# Patient Record
Sex: MALE | Race: WHITE | Employment: OTHER
[De-identification: names, ages, dates, MRNs, and addresses within clinical notes are randomized per-mention and may not be internally consistent; named-entity substitution may affect disease eponyms.]

---

## 2017-02-06 DIAGNOSIS — E78.5 DYSLIPIDEMIA: ICD-10-CM

## 2017-02-06 DIAGNOSIS — Z79.4 UNCONTROLLED TYPE 2 DIABETES MELLITUS WITH CHRONIC KIDNEY DISEASE, WITH LONG-TERM CURRENT USE OF INSULIN, UNSPECIFIED CKD STAGE: ICD-10-CM

## 2017-02-06 DIAGNOSIS — E11.22 UNCONTROLLED TYPE 2 DIABETES MELLITUS WITH CHRONIC KIDNEY DISEASE, WITH LONG-TERM CURRENT USE OF INSULIN, UNSPECIFIED CKD STAGE: ICD-10-CM

## 2017-02-06 DIAGNOSIS — E11.65 UNCONTROLLED TYPE 2 DIABETES MELLITUS WITH CHRONIC KIDNEY DISEASE, WITH LONG-TERM CURRENT USE OF INSULIN, UNSPECIFIED CKD STAGE: ICD-10-CM

## 2017-02-06 RX ORDER — ATORVASTATIN CALCIUM 40 MG/1
TABLET, FILM COATED ORAL
Qty: 90 TABLET | Refills: 1 | Status: SHIPPED | OUTPATIENT
Start: 2017-02-06 | End: 2017-07-14 | Stop reason: SDUPTHER

## 2017-02-06 RX ORDER — GABAPENTIN 300 MG/1
300 CAPSULE ORAL 2 TIMES DAILY
Qty: 180 CAPSULE | Refills: 1 | Status: SHIPPED | OUTPATIENT
Start: 2017-02-06 | End: 2017-06-06 | Stop reason: ALTCHOICE

## 2017-02-07 ENCOUNTER — OFFICE VISIT (OUTPATIENT)
Dept: PRIMARY CARE CLINIC | Facility: CLINIC | Age: 62
End: 2017-02-07

## 2017-02-07 VITALS
SYSTOLIC BLOOD PRESSURE: 121 MMHG | HEIGHT: 67 IN | DIASTOLIC BLOOD PRESSURE: 86 MMHG | WEIGHT: 217.9 LBS | TEMPERATURE: 97.4 F | HEART RATE: 97 BPM | RESPIRATION RATE: 20 BRPM | BODY MASS INDEX: 34.2 KG/M2

## 2017-02-07 DIAGNOSIS — E78.5 DYSLIPIDEMIA: Chronic | ICD-10-CM

## 2017-02-07 DIAGNOSIS — I10 ESSENTIAL HYPERTENSION: Chronic | ICD-10-CM

## 2017-02-07 PROCEDURE — 99214 OFFICE O/P EST MOD 30 MIN: CPT | Performed by: NURSE PRACTITIONER

## 2017-02-07 ASSESSMENT — ENCOUNTER SYMPTOMS
SORE THROAT: 0
RHINORRHEA: 0
CONSTIPATION: 0
VISUAL CHANGE: 0
SHORTNESS OF BREATH: 0
COUGH: 0
ABDOMINAL PAIN: 0
VOMITING: 0
NAUSEA: 0
DIARRHEA: 0
WHEEZING: 0

## 2017-02-08 DIAGNOSIS — E11.65 UNCONTROLLED TYPE 2 DIABETES MELLITUS WITH CHRONIC KIDNEY DISEASE, WITH LONG-TERM CURRENT USE OF INSULIN, UNSPECIFIED CKD STAGE: ICD-10-CM

## 2017-02-08 DIAGNOSIS — Z79.4 UNCONTROLLED TYPE 2 DIABETES MELLITUS WITH CHRONIC KIDNEY DISEASE, WITH LONG-TERM CURRENT USE OF INSULIN, UNSPECIFIED CKD STAGE: ICD-10-CM

## 2017-02-08 DIAGNOSIS — E11.22 UNCONTROLLED TYPE 2 DIABETES MELLITUS WITH CHRONIC KIDNEY DISEASE, WITH LONG-TERM CURRENT USE OF INSULIN, UNSPECIFIED CKD STAGE: ICD-10-CM

## 2017-03-16 ENCOUNTER — HOSPITAL ENCOUNTER (OUTPATIENT)
Age: 62
Discharge: HOME | End: 2017-03-16
Payer: MEDICARE

## 2017-03-16 LAB
ANION GAP SERPL CALCULATED.3IONS-SCNC: 12 MMOL/L (ref 9–17)
BUN BLDV-MCNC: 23 MG/DL (ref 6–20)
BUN/CREAT BLD: 18 (ref 9–20)
CALCIUM SERPL-MCNC: 9.3 MG/DL (ref 8.6–10.4)
CHLORIDE BLD-SCNC: 99 MMOL/L (ref 98–107)
CO2: 25 MMOL/L (ref 20–31)
CREAT SERPL-MCNC: 1.28 MG/DL (ref 0.7–1.2)
CREATININE URINE: 114.4 MG/DL (ref 39–259)
GFR AFRICAN AMERICAN: >60 ML/MIN
GFR NON-AFRICAN AMERICAN: 58 ML/MIN
GFR SERPL CREATININE-BSD FRML MDRD: ABNORMAL ML/MIN/{1.73_M2}
GFR SERPL CREATININE-BSD FRML MDRD: ABNORMAL ML/MIN/{1.73_M2}
GLUCOSE BLD-MCNC: 316 MG/DL (ref 70–99)
POTASSIUM SERPL-SCNC: 5.6 MMOL/L (ref 3.7–5.3)
SODIUM BLD-SCNC: 136 MMOL/L (ref 135–144)
TOTAL PROTEIN, URINE: 5 MG/DL

## 2017-03-16 PROCEDURE — 80048 BASIC METABOLIC PNL TOTAL CA: CPT

## 2017-03-16 PROCEDURE — 84156 ASSAY OF PROTEIN URINE: CPT

## 2017-03-16 PROCEDURE — 82570 ASSAY OF URINE CREATININE: CPT

## 2017-03-17 DIAGNOSIS — E87.5 HYPERKALEMIA: Primary | ICD-10-CM

## 2017-03-22 ENCOUNTER — HOSPITAL ENCOUNTER (OUTPATIENT)
Age: 62
Discharge: HOME | End: 2017-03-22
Payer: MEDICARE

## 2017-03-22 DIAGNOSIS — E87.5 HYPERKALEMIA: ICD-10-CM

## 2017-03-22 LAB
ANION GAP SERPL CALCULATED.3IONS-SCNC: 12 MMOL/L (ref 9–17)
CHLORIDE BLD-SCNC: 98 MMOL/L (ref 98–107)
CO2: 26 MMOL/L (ref 20–31)
POTASSIUM SERPL-SCNC: 5.1 MMOL/L (ref 3.7–5.3)
SODIUM BLD-SCNC: 136 MMOL/L (ref 135–144)

## 2017-03-22 PROCEDURE — 80051 ELECTROLYTE PANEL: CPT

## 2017-03-22 PROCEDURE — 36415 COLL VENOUS BLD VENIPUNCTURE: CPT

## 2017-04-25 ENCOUNTER — HOSPITAL ENCOUNTER (OUTPATIENT)
Age: 62
Discharge: HOME | End: 2017-04-25
Payer: MEDICARE

## 2017-04-25 LAB
ALBUMIN SERPL-MCNC: 4 G/DL (ref 3.5–5.2)
ANION GAP SERPL CALCULATED.3IONS-SCNC: 13 MMOL/L (ref 9–17)
BUN BLDV-MCNC: 25 MG/DL (ref 6–20)
BUN/CREAT BLD: 21 (ref 9–20)
CALCIUM SERPL-MCNC: 9.1 MG/DL (ref 8.6–10.4)
CHLORIDE BLD-SCNC: 103 MMOL/L (ref 98–107)
CHOLESTEROL/HDL RATIO: 5
CHOLESTEROL: 105 MG/DL
CO2: 24 MMOL/L (ref 20–31)
CREAT SERPL-MCNC: 1.21 MG/DL (ref 0.7–1.2)
CREATININE URINE: 107.1 MG/DL (ref 39–259)
GFR AFRICAN AMERICAN: >60 ML/MIN
GFR NON-AFRICAN AMERICAN: >60 ML/MIN
GFR SERPL CREATININE-BSD FRML MDRD: ABNORMAL ML/MIN/{1.73_M2}
GFR SERPL CREATININE-BSD FRML MDRD: ABNORMAL ML/MIN/{1.73_M2}
GLUCOSE BLD-MCNC: 259 MG/DL (ref 70–99)
HDLC SERPL-MCNC: 21 MG/DL
LDL CHOLESTEROL: 29 MG/DL (ref 0–130)
MICROALBUMIN/CREAT 24H UR: <12 MG/L
MICROALBUMIN/CREAT UR-RTO: 11 MCG/MG CREAT
PHOSPHORUS: 2.7 MG/DL (ref 2.5–4.5)
POTASSIUM SERPL-SCNC: 5.6 MMOL/L (ref 3.7–5.3)
SODIUM BLD-SCNC: 140 MMOL/L (ref 135–144)
TRIGL SERPL-MCNC: 275 MG/DL
VITAMIN D 25-HYDROXY: 19.1 NG/ML (ref 30–100)
VLDLC SERPL CALC-MCNC: ABNORMAL MG/DL (ref 1–30)

## 2017-04-25 PROCEDURE — 36415 COLL VENOUS BLD VENIPUNCTURE: CPT

## 2017-04-25 PROCEDURE — 82306 VITAMIN D 25 HYDROXY: CPT

## 2017-04-25 PROCEDURE — 80061 LIPID PANEL: CPT

## 2017-04-25 PROCEDURE — 82570 ASSAY OF URINE CREATININE: CPT

## 2017-04-25 PROCEDURE — 80069 RENAL FUNCTION PANEL: CPT

## 2017-04-25 PROCEDURE — 82043 UR ALBUMIN QUANTITATIVE: CPT

## 2017-05-31 ENCOUNTER — HOSPITAL ENCOUNTER (OUTPATIENT)
Age: 62
Discharge: HOME | End: 2017-05-31
Payer: MEDICARE

## 2017-05-31 DIAGNOSIS — E78.5 DYSLIPIDEMIA: Chronic | ICD-10-CM

## 2017-05-31 LAB
ALBUMIN SERPL-MCNC: 3.9 G/DL (ref 3.5–5.2)
ALBUMIN/GLOBULIN RATIO: 1.5 (ref 1–2.5)
ALP BLD-CCNC: 104 U/L (ref 40–129)
ALT SERPL-CCNC: 23 U/L (ref 5–41)
ANION GAP SERPL CALCULATED.3IONS-SCNC: 14 MMOL/L (ref 9–17)
AST SERPL-CCNC: 17 U/L
BILIRUB SERPL-MCNC: 0.23 MG/DL (ref 0.3–1.2)
BUN BLDV-MCNC: 20 MG/DL (ref 6–20)
BUN/CREAT BLD: 18 (ref 9–20)
CALCIUM SERPL-MCNC: 9.1 MG/DL (ref 8.6–10.4)
CHLORIDE BLD-SCNC: 100 MMOL/L (ref 98–107)
CHOLESTEROL/HDL RATIO: 6.5
CHOLESTEROL: 137 MG/DL
CO2: 23 MMOL/L (ref 20–31)
CREAT SERPL-MCNC: 1.11 MG/DL (ref 0.7–1.2)
CREATININE URINE: 98.2 MG/DL (ref 39–259)
ESTIMATED AVERAGE GLUCOSE: 249 MG/DL
GFR AFRICAN AMERICAN: >60 ML/MIN
GFR NON-AFRICAN AMERICAN: >60 ML/MIN
GFR SERPL CREATININE-BSD FRML MDRD: ABNORMAL ML/MIN/{1.73_M2}
GFR SERPL CREATININE-BSD FRML MDRD: ABNORMAL ML/MIN/{1.73_M2}
GLUCOSE BLD-MCNC: 294 MG/DL (ref 70–99)
HBA1C MFR BLD: 10.3 % (ref 4.8–5.9)
HDLC SERPL-MCNC: 21 MG/DL
LDL CHOLESTEROL: 57 MG/DL (ref 0–130)
MICROALBUMIN/CREAT 24H UR: 17 MG/L
MICROALBUMIN/CREAT UR-RTO: 17 MCG/MG CREAT
POTASSIUM SERPL-SCNC: 5 MMOL/L (ref 3.7–5.3)
SODIUM BLD-SCNC: 137 MMOL/L (ref 135–144)
TOTAL PROTEIN: 6.5 G/DL (ref 6.4–8.3)
TRIGL SERPL-MCNC: 296 MG/DL
VLDLC SERPL CALC-MCNC: ABNORMAL MG/DL (ref 1–30)

## 2017-05-31 PROCEDURE — 80053 COMPREHEN METABOLIC PANEL: CPT

## 2017-05-31 PROCEDURE — 36415 COLL VENOUS BLD VENIPUNCTURE: CPT

## 2017-05-31 PROCEDURE — 82043 UR ALBUMIN QUANTITATIVE: CPT

## 2017-05-31 PROCEDURE — 83036 HEMOGLOBIN GLYCOSYLATED A1C: CPT

## 2017-05-31 PROCEDURE — 82570 ASSAY OF URINE CREATININE: CPT

## 2017-05-31 PROCEDURE — 80061 LIPID PANEL: CPT

## 2017-06-06 ENCOUNTER — OFFICE VISIT (OUTPATIENT)
Dept: PRIMARY CARE CLINIC | Age: 62
End: 2017-06-06
Payer: MEDICARE

## 2017-06-06 VITALS
BODY MASS INDEX: 36.38 KG/M2 | TEMPERATURE: 98.5 F | WEIGHT: 231.8 LBS | HEART RATE: 100 BPM | HEIGHT: 67 IN | DIASTOLIC BLOOD PRESSURE: 86 MMHG | RESPIRATION RATE: 20 BRPM | SYSTOLIC BLOOD PRESSURE: 135 MMHG

## 2017-06-06 DIAGNOSIS — I10 ESSENTIAL HYPERTENSION: Chronic | ICD-10-CM

## 2017-06-06 DIAGNOSIS — E78.5 DYSLIPIDEMIA: Chronic | ICD-10-CM

## 2017-06-06 PROCEDURE — 99214 OFFICE O/P EST MOD 30 MIN: CPT | Performed by: NURSE PRACTITIONER

## 2017-06-06 RX ORDER — ISOPROPYL ALCOHOL 70 ML/100ML
SWAB TOPICAL
Refills: 11 | COMMUNITY
Start: 2017-05-15

## 2017-06-06 ASSESSMENT — ENCOUNTER SYMPTOMS
WHEEZING: 0
CONSTIPATION: 0
DIARRHEA: 0
VISUAL CHANGE: 0
VOMITING: 0
COUGH: 0
NAUSEA: 0
SORE THROAT: 0
ABDOMINAL PAIN: 0
SHORTNESS OF BREATH: 0
RHINORRHEA: 0

## 2017-06-29 ENCOUNTER — TELEPHONE (OUTPATIENT)
Dept: PRIMARY CARE CLINIC | Age: 62
End: 2017-06-29

## 2017-06-29 DIAGNOSIS — Z12.12 SCREENING FOR COLORECTAL CANCER: Primary | ICD-10-CM

## 2017-06-29 DIAGNOSIS — Z12.11 SCREENING FOR COLORECTAL CANCER: Primary | ICD-10-CM

## 2017-06-29 DIAGNOSIS — Z12.11 SCREENING FOR COLORECTAL CANCER: ICD-10-CM

## 2017-06-29 DIAGNOSIS — Z12.12 SCREENING FOR COLORECTAL CANCER: ICD-10-CM

## 2017-06-29 LAB
CONTROL: PRESENT
HEMOCCULT STL QL: NEGATIVE

## 2017-06-29 PROCEDURE — 82274 ASSAY TEST FOR BLOOD FECAL: CPT | Performed by: NURSE PRACTITIONER

## 2017-07-14 DIAGNOSIS — E11.22 UNCONTROLLED TYPE 2 DIABETES MELLITUS WITH CHRONIC KIDNEY DISEASE, WITH LONG-TERM CURRENT USE OF INSULIN, UNSPECIFIED CKD STAGE: ICD-10-CM

## 2017-07-14 DIAGNOSIS — E78.5 DYSLIPIDEMIA: ICD-10-CM

## 2017-07-14 DIAGNOSIS — E11.65 UNCONTROLLED TYPE 2 DIABETES MELLITUS WITH CHRONIC KIDNEY DISEASE, WITH LONG-TERM CURRENT USE OF INSULIN, UNSPECIFIED CKD STAGE: ICD-10-CM

## 2017-07-14 DIAGNOSIS — Z79.4 UNCONTROLLED TYPE 2 DIABETES MELLITUS WITH CHRONIC KIDNEY DISEASE, WITH LONG-TERM CURRENT USE OF INSULIN, UNSPECIFIED CKD STAGE: ICD-10-CM

## 2017-07-14 RX ORDER — ATORVASTATIN CALCIUM 40 MG/1
TABLET, FILM COATED ORAL
Qty: 90 TABLET | Refills: 1 | Status: SHIPPED | OUTPATIENT
Start: 2017-07-14 | End: 2017-11-09 | Stop reason: SDUPTHER

## 2017-07-14 RX ORDER — MELATONIN
Qty: 90 TABLET | Refills: 1 | Status: SHIPPED | OUTPATIENT
Start: 2017-07-14 | End: 2018-01-11 | Stop reason: SDUPTHER

## 2017-07-14 RX ORDER — ASPIRIN 81 MG
TABLET, DELAYED RELEASE (ENTERIC COATED) ORAL
Qty: 90 TABLET | Refills: 1 | Status: SHIPPED | OUTPATIENT
Start: 2017-07-14 | End: 2018-01-11 | Stop reason: SDUPTHER

## 2017-07-14 RX ORDER — LINAGLIPTIN 5 MG/1
TABLET, FILM COATED ORAL
Qty: 90 TABLET | Refills: 1 | Status: SHIPPED | OUTPATIENT
Start: 2017-07-14 | End: 2018-01-11 | Stop reason: SDUPTHER

## 2017-09-18 ENCOUNTER — HOSPITAL ENCOUNTER (OUTPATIENT)
Age: 62
Discharge: HOME | End: 2017-09-18
Payer: MEDICARE

## 2017-09-18 DIAGNOSIS — E87.5 HYPERKALEMIA: ICD-10-CM

## 2017-09-18 LAB
ANION GAP SERPL CALCULATED.3IONS-SCNC: 13 MMOL/L (ref 9–17)
BUN BLDV-MCNC: 29 MG/DL (ref 6–20)
BUN/CREAT BLD: 21 (ref 9–20)
CALCIUM SERPL-MCNC: 9.4 MG/DL (ref 8.6–10.4)
CHLORIDE BLD-SCNC: 105 MMOL/L (ref 98–107)
CO2: 23 MMOL/L (ref 20–31)
CREAT SERPL-MCNC: 1.39 MG/DL (ref 0.7–1.2)
CREATININE URINE: 130.8 MG/DL (ref 39–259)
GFR AFRICAN AMERICAN: >60 ML/MIN
GFR NON-AFRICAN AMERICAN: 52 ML/MIN
GFR SERPL CREATININE-BSD FRML MDRD: ABNORMAL ML/MIN/{1.73_M2}
GFR SERPL CREATININE-BSD FRML MDRD: ABNORMAL ML/MIN/{1.73_M2}
GLUCOSE BLD-MCNC: 203 MG/DL (ref 70–99)
POTASSIUM SERPL-SCNC: 5.8 MMOL/L (ref 3.7–5.3)
SODIUM BLD-SCNC: 141 MMOL/L (ref 135–144)
TOTAL PROTEIN, URINE: 9 MG/DL

## 2017-09-18 PROCEDURE — 36415 COLL VENOUS BLD VENIPUNCTURE: CPT

## 2017-09-18 PROCEDURE — 82570 ASSAY OF URINE CREATININE: CPT

## 2017-09-18 PROCEDURE — 84156 ASSAY OF PROTEIN URINE: CPT

## 2017-09-18 PROCEDURE — 80048 BASIC METABOLIC PNL TOTAL CA: CPT

## 2017-09-25 ENCOUNTER — HOSPITAL ENCOUNTER (OUTPATIENT)
Age: 62
Discharge: HOME | End: 2017-09-25
Payer: MEDICARE

## 2017-09-25 DIAGNOSIS — E87.5 HYPERKALEMIA: ICD-10-CM

## 2017-09-25 LAB
ANION GAP SERPL CALCULATED.3IONS-SCNC: 13 MMOL/L (ref 9–17)
CHLORIDE BLD-SCNC: 100 MMOL/L (ref 98–107)
CO2: 24 MMOL/L (ref 20–31)
POTASSIUM SERPL-SCNC: 5 MMOL/L (ref 3.7–5.3)
SODIUM BLD-SCNC: 137 MMOL/L (ref 135–144)

## 2017-09-25 PROCEDURE — 80051 ELECTROLYTE PANEL: CPT

## 2017-09-25 PROCEDURE — 36415 COLL VENOUS BLD VENIPUNCTURE: CPT

## 2017-09-29 ENCOUNTER — HOSPITAL ENCOUNTER (OUTPATIENT)
Age: 62
Discharge: HOME | End: 2017-09-29
Payer: MEDICARE

## 2017-09-29 LAB
ALBUMIN SERPL-MCNC: 4.1 G/DL (ref 3.5–5.2)
ALBUMIN/GLOBULIN RATIO: 1.3 (ref 1–2.5)
ALP BLD-CCNC: 97 U/L (ref 40–129)
ALT SERPL-CCNC: 20 U/L (ref 5–41)
ANION GAP SERPL CALCULATED.3IONS-SCNC: 15 MMOL/L (ref 9–17)
AST SERPL-CCNC: 15 U/L
BILIRUB SERPL-MCNC: 0.31 MG/DL (ref 0.3–1.2)
BUN BLDV-MCNC: 22 MG/DL (ref 6–20)
BUN/CREAT BLD: 18 (ref 9–20)
CALCIUM SERPL-MCNC: 9.9 MG/DL (ref 8.6–10.4)
CHLORIDE BLD-SCNC: 104 MMOL/L (ref 98–107)
CO2: 23 MMOL/L (ref 20–31)
CREAT SERPL-MCNC: 1.25 MG/DL (ref 0.7–1.2)
CREATININE URINE: 103.5 MG/DL (ref 39–259)
ESTIMATED AVERAGE GLUCOSE: 235 MG/DL
GFR AFRICAN AMERICAN: >60 ML/MIN
GFR NON-AFRICAN AMERICAN: 59 ML/MIN
GFR SERPL CREATININE-BSD FRML MDRD: ABNORMAL ML/MIN/{1.73_M2}
GFR SERPL CREATININE-BSD FRML MDRD: ABNORMAL ML/MIN/{1.73_M2}
GLUCOSE BLD-MCNC: 201 MG/DL (ref 70–99)
HBA1C MFR BLD: 9.8 % (ref 4.8–5.9)
MICROALBUMIN/CREAT 24H UR: <12 MG/L
MICROALBUMIN/CREAT UR-RTO: 12 MCG/MG CREAT
POTASSIUM SERPL-SCNC: 5.2 MMOL/L (ref 3.7–5.3)
SODIUM BLD-SCNC: 142 MMOL/L (ref 135–144)
TOTAL PROTEIN: 7.2 G/DL (ref 6.4–8.3)

## 2017-09-29 PROCEDURE — 82570 ASSAY OF URINE CREATININE: CPT

## 2017-09-29 PROCEDURE — 36415 COLL VENOUS BLD VENIPUNCTURE: CPT

## 2017-09-29 PROCEDURE — 80053 COMPREHEN METABOLIC PANEL: CPT

## 2017-09-29 PROCEDURE — 82043 UR ALBUMIN QUANTITATIVE: CPT

## 2017-09-29 PROCEDURE — 83036 HEMOGLOBIN GLYCOSYLATED A1C: CPT

## 2017-10-05 ENCOUNTER — OFFICE VISIT (OUTPATIENT)
Dept: PRIMARY CARE CLINIC | Age: 62
End: 2017-10-05
Payer: MEDICARE

## 2017-10-05 VITALS
DIASTOLIC BLOOD PRESSURE: 89 MMHG | RESPIRATION RATE: 20 BRPM | TEMPERATURE: 98.1 F | HEART RATE: 95 BPM | SYSTOLIC BLOOD PRESSURE: 131 MMHG | WEIGHT: 238.8 LBS | BODY MASS INDEX: 37.48 KG/M2 | HEIGHT: 67 IN

## 2017-10-05 DIAGNOSIS — I10 ESSENTIAL HYPERTENSION: Chronic | ICD-10-CM

## 2017-10-05 DIAGNOSIS — E78.5 DYSLIPIDEMIA: Chronic | ICD-10-CM

## 2017-10-05 DIAGNOSIS — Z23 NEED FOR INFLUENZA VACCINATION: ICD-10-CM

## 2017-10-05 PROCEDURE — 90686 IIV4 VACC NO PRSV 0.5 ML IM: CPT | Performed by: NURSE PRACTITIONER

## 2017-10-05 PROCEDURE — 90471 IMMUNIZATION ADMIN: CPT | Performed by: NURSE PRACTITIONER

## 2017-10-05 PROCEDURE — 99214 OFFICE O/P EST MOD 30 MIN: CPT | Performed by: NURSE PRACTITIONER

## 2017-10-05 ASSESSMENT — ENCOUNTER SYMPTOMS
ABDOMINAL PAIN: 0
WHEEZING: 0
NAUSEA: 0
SHORTNESS OF BREATH: 0
SORE THROAT: 0
CONSTIPATION: 0
COUGH: 0
VOMITING: 0
RHINORRHEA: 0
DIARRHEA: 0

## 2017-10-05 ASSESSMENT — PATIENT HEALTH QUESTIONNAIRE - PHQ9
1. LITTLE INTEREST OR PLEASURE IN DOING THINGS: 0
SUM OF ALL RESPONSES TO PHQ QUESTIONS 1-9: 1
SUM OF ALL RESPONSES TO PHQ9 QUESTIONS 1 & 2: 1
2. FEELING DOWN, DEPRESSED OR HOPELESS: 1

## 2017-10-05 NOTE — PROGRESS NOTES
Name: Dilshad Quintana  :   Chronic Disease Visit Information    BP Readings from Last 3 Encounters:   10/05/17 131/89   17 134/86   17 135/86          Hemoglobin A1C (%)   Date Value   2017 9.8 (H)   2017 10.3 (H)   2017 10.0 (H)     Microalb/Crt. Ratio (mcg/mg creat)   Date Value   2017 12     LDL Cholesterol (mg/dL)   Date Value   2017 57     HDL (mg/dL)   Date Value   2017 21 (L)     BUN (mg/dL)   Date Value   2017 22 (H)     CREATININE (mg/dL)   Date Value   2017 1.25 (H)     Glucose (mg/dL)   Date Value   2017 201 (H)   2012 200 (H)            Have you changed or started any medications since your last visit including any over-the-counter medicines, vitamins, or herbal medicines? no   Are you having any side effects from any of your medications? -  no  Have you stopped taking any of your medications? Is so, why? -  no    Have you seen any other physician or provider since your last visit? No  Have you had any other diagnostic tests since your last visit? Yes - Records Obtained  Have you been seen in the emergency room and/or had an admission to a hospital since we last saw you? No  Have you had your annual diabetic retinal (eye) exam? Yes - Records Requested  Have you had your routine dental cleaning in the past 6 months? no    Have you activated your [employer] account? If not, what are your barriers?  No    Patient Care Team:  Yady Fisher CNP as PCP - General (Nurse Practitioner)         Medical History Review  Past Medical, Family, and Social History reviewed and does contribute to the patient presenting condition    Health Maintenance   Topic Date Due    Pneumococcal med risk (1 of 1 - PPSV23) 2018 (Originally 11/15/1976)    Diabetic foot exam  2017    Diabetic retinal exam  2017    Diabetic hemoglobin A1C test  2017    Lipid screen  2018    Colon Cancer Screen FIT/FOBT  2018    DTaP/Tdap/Td vaccine (2 - Td) 10/02/2025    Flu vaccine  Completed    Hepatitis C screen  Completed    HIV screen  Completed         Chief Complaint:     Chief Complaint   Patient presents with    Diabetes     Routine office visit. Blood sugar today was 240.  Hyperlipidemia    Hypertension       History of Present Illness:      Miriam Acevedo is a 63 y.o.  male who presents with Diabetes (Routine office visit. Blood sugar today was 240. ); Hyperlipidemia; and Hypertension      HPI Comments: Anthony Beltran is here today for his routine office visit. DM- has established care with endocrinologist, increasing insulin (long acting and mealtime) gradually, feels better. UPDATE- CONTINUES WITH ENDOCRINOLOGY    Diabetes   He presents for his follow-up diabetic visit. He has type 2 diabetes mellitus. The initial diagnosis of diabetes was made 18 years ago. His disease course has been stable (SLIGHT IMPROVEMENT). There are no hypoglycemic associated symptoms. Pertinent negatives for hypoglycemia include no dizziness or headaches. Associated symptoms include foot paresthesias. Pertinent negatives for diabetes include no chest pain, no fatigue, no polydipsia, no polyphagia and no polyuria. There are no hypoglycemic complications. Symptoms are worsening. Diabetic complications include a CVA and nephropathy. Pertinent negatives for diabetic complications include no heart disease or peripheral neuropathy. Risk factors for coronary artery disease include diabetes mellitus, dyslipidemia, hypertension, male sex, obesity and sedentary lifestyle. Current diabetic treatment includes insulin injections, oral agent (dual therapy) and oral agent (monotherapy). He is compliant with treatment all of the time. His weight is stable. He is following a generally healthy diet. When asked about meal planning, he reported none. He has not had a previous visit with a dietitian. He participates in exercise daily.  There is no change in his home

## 2017-10-05 NOTE — MR AVS SNAPSHOT
After Visit Summary             Ondina Sheets   10/5/2017 11:00 AM   Office Visit    Description:  Male : 1955   Provider:  Ronni Becerra CNP   Department:  [facility]              Your Follow-Up and Future Appointments         Below is a list of your follow-up and future appointments. This may not be a complete list as you may have made appointments directly with providers that we are not aware of or your providers may have made some for you. Please call your providers to confirm appointments. It is important to keep your appointments. Please bring your current insurance card, photo ID, co-pay, and all medication bottles to your appointment. If self-pay, payment is expected at the time of service. Your To-Do List     Future Appointments Provider Department Dept Phone    2018 9:30 AM Jairo Fisher CNP [facility] [phone]    Please arrive 15 minutes prior to appointment, bring photo ID and insurance card. 2018 1:30 PM Angus Stringer MD [facility] [address] [place] [address]    Please arrive 15 minutes prior to appointment time, bring insurance card and photo ID. Future Orders Complete By Expires    CBC Auto Differential [[id number] Custom]  4/3/2018 (Approximate) 10/5/2018    Comprehensive Metabolic Panel [PLG19 Custom]  4/3/2018 (Approximate) 10/5/2018    Hemoglobin A1C [LAB90 Custom]  4/3/2018 (Approximate) 10/5/2018    Lipid Panel [LAB18 Custom]  4/3/2018 (Approximate) 10/5/2018    Microalbumin, Ur [[id number] Custom]  4/3/2018 (Approximate) 10/5/2018    Follow-Up    Return in about 6 months (around 2018) for check up.          Information from Your Visit        Department     Name Address Phone Fax    [facility] [address]   [address] [phone]      You Were Seen for:         Comments    Uncontrolled type 2 diabetes mellitus with diabetic mononeuropathy, with long-term current use of insulin ([facility])   [[id number]]         Vital Signs     Blood Pressure Pulse Temperature Respirations Height Weight    131/89 (Site: Right Arm, Position: Sitting, Cuff Size: Large Adult) 95 98.1 °F (36.7 °C) (Temporal) 20 5' 7\" (1.702 m) 238 lb 12.8 oz (108.3 kg)    Body Mass Index Smoking Status                37.4 kg/m2 Former Smoker          Additional Information about your Body Mass Index (BMI)           Your BMI as listed above is considered obese (30 or more). BMI is an estimate of body fat, calculated from your height and weight. The higher your BMI, the greater your risk of heart disease, high blood pressure, type 2 diabetes, stroke, gallstones, arthritis, sleep apnea, and certain cancers. BMI is not perfect. It may overestimate body fat in athletes and people who are more muscular. Even a small weight loss (between 5 and 10 percent of your current weight) by decreasing your calorie intake and becoming more physically active will help lower your risk of developing or worsening diseases associated with obesity. Learn more at: [employer]          Instructions         Diabetes Foot Health: Care Instructions  Your Care Instructions    When you have diabetes, your feet need extra care and attention. Diabetes can damage the nerve endings and blood vessels in your feet, making you less likely to notice when your feet are injured. Diabetes also limits your body's ability to fight infection and get blood to areas that need it. If you get a minor foot injury, it could become an ulcer or a serious infection. With good foot care, you can prevent most of these problems. Caring for your feet can be quick and easy. Most of the care can be done when you are bathing or getting ready for bed. Follow-up care is a key part of your treatment and safety.  Be sure to make and go to all appointments, and call your doctor if you are having Where can you learn more? Go to [email or web address] and sign in to your [employer] account. Enter A739 in the [employer] box to learn more about \"Diabetes Foot Health: Care Instructions. \"     If you do not have an account, please click on the \"Sign Up Now\" link. Current as of: March 13, 2017  Content Version: 11.3  © 8013-1424 [employer]. Care instructions adapted under license by [facility] ([facility]). If you have questions about a medical condition or this instruction, always ask your healthcare professional. [place] [address] any warranty or liability for your use of this information. Today's Medication Changes          These changes are accurate as of: 10/5/17 11:18 AM.  If you have any questions, ask your nurse or doctor. CHANGE how you take these medications           sodium polystyrene 15 GM/60ML suspension   Commonly known as:  SPS   Instructions: Take 60 mLs by mouth once for 1 dose   Quantity:  1 Bottle   Refills:  0   What changed:  Another medication with the same name was removed. Continue taking this medication, and follow the directions you see here.    Changed by:  Sisi Rogers MD               Your Current Medications Are              metFORMIN (GLUCOPHAGE) 1000 MG tablet Take 1,000 mg by mouth 2 times daily (with meals)    insulin glargine (BASAGLAR KWIKPEN) 100 UNIT/ML injection pen Inject 48 Units into the skin 2 times daily    Cholecalciferol (VITAMIN D3) 1000 units TABS TAKE 1 TABLET BY MOUTH DAILY    ASPIRIN LOW DOSE 81 MG EC tablet TAKE 1 TABLET BY MOUTH DAILY    atorvastatin (LIPITOR) 40 MG tablet TAKE ONE TABLET BY MOUTH DAILY    TRADJENTA 5 MG tablet TAKE 1 TABLET BY MOUTH DAILY    Alcohol Swabs (EASY TOUCH ALCOHOL PREP MEDIUM) 70 % PADS USE TO TEST TWICE DAILY    LYRICA 75 MG capsule TAKE (1) CAPSULE BY MOUTH TWICE DAILY    lisinopril (PRINIVIL;ZESTRIL) 5 MG tablet Take 1 tablet by mouth daily Dr. Giovanna Hudson    insulin aspart (NOVOLOG FLEXPEN) 100 UNIT/ML injection pen Inject 4 Units into the skin 3 times daily (before meals)    Insulin Pen Needle 31G X 8 MM MISC 1 each by Does not apply route 5 times daily Diabetes type 2, uncontrolled E11.41, E11.65, Z79.4    TRUE METRIX BLOOD GLUCOSE TEST strip 1 each by In Vitro route 5 times daily    Blood Glucose Monitoring Suppl (TRUE METRIX METER) W/DEVICE KIT     TRUEPLUS LANCETS 33G MISC     allopurinol (ZYLOPRIM) 100 MG tablet Take 100 mg by mouth daily     sodium polystyrene (SPS) 15 GM/60ML suspension Take 60 mLs by mouth once for 1 dose      Allergies           No Known Allergies      We Ordered/Performed the following           INFLUENZA, QUADV, 3 YRS AND OLDER, IM, PF, PREFILL SYR OR SDV, 0.5ML (FLUZONE QUADV, PF)          Additional Information        Basic Information     Date Of Birth Sex Race Ethnicity Preferred Language Preferred Written Language    1955 Male White Non-/Non  English English      Problem List as of 10/5/2017  Date Reviewed: 9/27/2017                CKD (chronic kidney disease) stage 3, GFR 30-59 ml/min (Chronic)    BLANE (acute kidney injury) ([facility] [address] (Chronic)    Essential hypertension (Chronic)    Gout (Chronic)    Dyslipidemia (Chronic)    Diabetes type 2, uncontrolled ([facility] [address] (Chronic)      Immunizations as of 10/5/2017     Name Date    Influenza Virus Vaccine 10/14/2015    Influenza, Joel Victor, 3 yrs and older, IM, Preservative Free 10/5/2017, 11/3/2016    Tdap (Boostrix, Adacel) 10/2/2015      Preventive Care        Date Due    Pneumococcal Vaccine - Pneumovax for adults aged 19-64 years with: chronic heart disease, chronic lung disease, diabetes mellitus, alcoholism, chronic liver disease, or cigarette smoking. (1 of 1 - PPSV23) 2/7/2018 (Originally 11/15/1976)    Diabetic Foot Exam 11/3/2017    Eye Exam By An Eye Doctor 12/13/2017    Hemoglobin A1C (Test For Long-Term Glucose Control) 12/29/2017 Cholesterol Screening 5/31/2018    Colon Cancer Stool Test 6/29/2018    Tetanus Combination Vaccine (2 - Td) 10/2/2025            MyChart Signup           Our records indicate that you have declined MyChart signup.

## 2017-11-09 DIAGNOSIS — E78.5 DYSLIPIDEMIA: ICD-10-CM

## 2017-11-09 RX ORDER — ATORVASTATIN CALCIUM 40 MG/1
TABLET, FILM COATED ORAL
Qty: 90 TABLET | Refills: 1 | Status: SHIPPED | OUTPATIENT
Start: 2017-11-09 | End: 2018-04-09 | Stop reason: SDUPTHER

## 2017-11-09 NOTE — TELEPHONE ENCOUNTER
Health Maintenance   Topic Date Due    Diabetic foot exam  11/03/2017    Pneumococcal med risk (1 of 1 - PPSV23) 02/07/2018 (Originally 11/15/1976)    Diabetic retinal exam  12/13/2017    Diabetic hemoglobin A1C test  12/29/2017    Lipid screen  05/31/2018    Colon Cancer Screen FIT/FOBT  06/29/2018    DTaP/Tdap/Td vaccine (2 - Td) 10/02/2025    Flu vaccine  Completed    Hepatitis C screen  Completed    HIV screen  Completed             (applicable per patient's age: Cancer Screenings, Depression Screening, Fall Risk Screening, Immunizations)    Hemoglobin A1C (%)   Date Value   09/29/2017 9.8 (H)   05/31/2017 10.3 (H)   02/03/2017 10.0 (H)     Microalb/Crt.  Ratio (mcg/mg creat)   Date Value   09/29/2017 12     LDL Cholesterol (mg/dL)   Date Value   05/31/2017 57     AST (U/L)   Date Value   09/29/2017 15     ALT (U/L)   Date Value   09/29/2017 20     BUN (mg/dL)   Date Value   09/29/2017 22 (H)      (goal A1C is < 7)   (goal LDL is <100) need 30-50% reduction from baseline     BP Readings from Last 3 Encounters:   10/05/17 131/89   09/27/17 134/86   06/06/17 135/86    (goal /80)      All Future Testing planned in [facility]:  Lab Frequency Next Occurrence   Basic Metabolic Panel Once 35/70/3000   PTH, INTACT WITH IONIZED CALCIUM Once 04/23/2018   Phosphorus Once 04/23/2018   Vitamin D 25 Hydroxy Once 04/23/2018   Creatinine, Random Urine Once 04/23/2018   Protein, urine, random Once 04/23/2018   CBC Auto Differential Once 04/03/2018   Comprehensive Metabolic Panel Once 65/08/7349   Hemoglobin A1C Once 04/03/2018   Lipid Panel Once 04/03/2018   Microalbumin, Ur Once 04/03/2018       Next Visit Date:  Future Appointments  Date Time Provider Penelope Pham   4/9/2018 9:30 AM Britt Fisher CNP [facility]   4/25/2018 1:30 PM Arsalan Salvador MD [facility] None            Patient Active Problem List:     Diabetes type 2, uncontrolled ([address]     Dyslipidemia     Gout     Essential hypertension BLANE (acute kidney injury) ([facility] [address]     CKD (chronic kidney disease) stage 3, GFR 30-59 ml/min

## 2018-01-11 DIAGNOSIS — E11.22 UNCONTROLLED TYPE 2 DIABETES MELLITUS WITH CHRONIC KIDNEY DISEASE, WITH LONG-TERM CURRENT USE OF INSULIN, UNSPECIFIED CKD STAGE: ICD-10-CM

## 2018-01-11 DIAGNOSIS — E78.5 DYSLIPIDEMIA: ICD-10-CM

## 2018-01-11 DIAGNOSIS — E11.65 UNCONTROLLED TYPE 2 DIABETES MELLITUS WITH CHRONIC KIDNEY DISEASE, WITH LONG-TERM CURRENT USE OF INSULIN, UNSPECIFIED CKD STAGE: ICD-10-CM

## 2018-01-11 DIAGNOSIS — Z79.4 UNCONTROLLED TYPE 2 DIABETES MELLITUS WITH CHRONIC KIDNEY DISEASE, WITH LONG-TERM CURRENT USE OF INSULIN, UNSPECIFIED CKD STAGE: ICD-10-CM

## 2018-01-11 RX ORDER — MELATONIN
Qty: 90 TABLET | Refills: 1 | Status: SHIPPED | OUTPATIENT
Start: 2018-01-11 | End: 2018-07-10 | Stop reason: SDUPTHER

## 2018-01-11 RX ORDER — ATORVASTATIN CALCIUM 40 MG/1
TABLET, FILM COATED ORAL
Qty: 90 TABLET | Refills: 1 | OUTPATIENT
Start: 2018-01-11

## 2018-01-11 RX ORDER — ASPIRIN 81 MG/1
TABLET ORAL
Qty: 90 TABLET | Refills: 1 | Status: SHIPPED | OUTPATIENT
Start: 2018-01-11 | End: 2018-07-10 | Stop reason: SDUPTHER

## 2018-01-11 RX ORDER — LINAGLIPTIN 5 MG/1
TABLET, FILM COATED ORAL
Qty: 90 TABLET | Refills: 1 | Status: SHIPPED | OUTPATIENT
Start: 2018-01-11 | End: 2018-05-04 | Stop reason: ALTCHOICE

## 2018-01-25 ENCOUNTER — HOSPITAL ENCOUNTER (OUTPATIENT)
Dept: VASCULAR LAB | Age: 63
Discharge: HOME | End: 2018-01-25
Payer: MEDICARE

## 2018-01-25 DIAGNOSIS — M79.672 FOOT PAIN, LEFT: ICD-10-CM

## 2018-01-25 DIAGNOSIS — M79.671 FOOT PAIN, RIGHT: ICD-10-CM

## 2018-01-25 PROCEDURE — 93923 UPR/LXTR ART STDY 3+ LVLS: CPT

## 2018-03-15 NOTE — TELEPHONE ENCOUNTER
Please verify dose with his endocrinologist and see if there is a reason they will not continue to prescribe his insulin. Thank you.

## 2018-03-15 NOTE — TELEPHONE ENCOUNTER
Patient states that he takes 52 units twice a day and he states that his pharmacy said that they will not refill his insulin but he is not sure why. Health Maintenance   Topic Date Due    Pneumococcal med risk (1 of 1 - PPSV23) 11/15/1976    Shingles Vaccine (1 of 2 - 2 Dose Series) 11/15/2007    Diabetic foot exam  11/03/2017    Diabetic retinal exam  12/13/2017    A1C test (Diabetic or Prediabetic)  12/29/2017    Lipid screen  05/31/2018    Colon Cancer Screen FIT/FOBT  06/29/2018    Potassium monitoring  09/29/2018    Creatinine monitoring  09/29/2018    DTaP/Tdap/Td vaccine (2 - Td) 10/02/2025    Flu vaccine  Completed    Hepatitis C screen  Completed    HIV screen  Completed             (applicable per patient's age: Cancer Screenings, Depression Screening, Fall Risk Screening, Immunizations)    Hemoglobin A1C (%)   Date Value   09/29/2017 9.8 (H)   05/31/2017 10.3 (H)   02/03/2017 10.0 (H)     Microalb/Crt.  Ratio (mcg/mg creat)   Date Value   09/29/2017 12     LDL Cholesterol (mg/dL)   Date Value   05/31/2017 57     AST (U/L)   Date Value   09/29/2017 15     ALT (U/L)   Date Value   09/29/2017 20     BUN (mg/dL)   Date Value   09/29/2017 22 (H)      (goal A1C is < 7)   (goal LDL is <100) need 30-50% reduction from baseline     BP Readings from Last 3 Encounters:   10/05/17 131/89   09/27/17 134/86   06/06/17 135/86    (goal /80)      All Future Testing planned in [facility]:  Lab Frequency Next Occurrence   Basic Metabolic Panel Once 97/86/8223   PTH, INTACT WITH IONIZED CALCIUM Once 04/23/2018   Phosphorus Once 04/23/2018   Vitamin D 25 Hydroxy Once 04/23/2018   Creatinine, Random Urine Once 04/23/2018   Protein, urine, random Once 04/23/2018   CBC Auto Differential Once 04/03/2018   Comprehensive Metabolic Panel Once 52/31/1209   Hemoglobin A1C Once 04/03/2018   Lipid Panel Once 04/03/2018   Microalbumin, Ur Once 04/03/2018       Next Visit Date:  Future Appointments  Date Time Provider Department Center   4/9/2018 9:30 AM Ronn Fisher, CNP [facility]   4/25/2018 1:30 PM Arsalan Horn MD [facility] None            Patient Active Problem List:     Diabetes type 2, uncontrolled ([facility] [address]     Dyslipidemia     Gout     Essential hypertension     BLANE (acute kidney injury) ([facility] [address]     CKD (chronic kidney disease) stage 3, GFR 30-59 ml/min

## 2018-04-04 ENCOUNTER — HOSPITAL ENCOUNTER (OUTPATIENT)
Age: 63
Discharge: HOME | End: 2018-04-04
Payer: MEDICARE

## 2018-04-04 DIAGNOSIS — E78.5 DYSLIPIDEMIA: Chronic | ICD-10-CM

## 2018-04-04 LAB
ABSOLUTE EOS #: 0.34 K/UL (ref 0–0.44)
ABSOLUTE IMMATURE GRANULOCYTE: 0.06 K/UL (ref 0–0.3)
ABSOLUTE LYMPH #: 2.61 K/UL (ref 1.1–3.7)
ABSOLUTE MONO #: 0.76 K/UL (ref 0.1–1.2)
ALBUMIN SERPL-MCNC: 3.6 G/DL (ref 3.5–5.2)
ALBUMIN/GLOBULIN RATIO: 1.1 (ref 1–2.5)
ALP BLD-CCNC: 109 U/L (ref 40–129)
ALT SERPL-CCNC: 17 U/L (ref 5–41)
ANION GAP SERPL CALCULATED.3IONS-SCNC: 11 MMOL/L (ref 9–17)
AST SERPL-CCNC: 13 U/L
BASOPHILS # BLD: 1 % (ref 0–2)
BASOPHILS ABSOLUTE: 0.05 K/UL (ref 0–0.2)
BILIRUB SERPL-MCNC: 0.26 MG/DL (ref 0.3–1.2)
BUN BLDV-MCNC: 21 MG/DL (ref 8–23)
BUN/CREAT BLD: 18 (ref 9–20)
CALCIUM SERPL-MCNC: 9.2 MG/DL (ref 8.6–10.4)
CHLORIDE BLD-SCNC: 102 MMOL/L (ref 98–107)
CHOLESTEROL/HDL RATIO: 5.4
CHOLESTEROL: 102 MG/DL
CO2: 24 MMOL/L (ref 20–31)
CREAT SERPL-MCNC: 1.2 MG/DL (ref 0.7–1.2)
CREATININE URINE: 104.1 MG/DL (ref 39–259)
DIFFERENTIAL TYPE: ABNORMAL
EOSINOPHILS RELATIVE PERCENT: 3 % (ref 1–4)
ESTIMATED AVERAGE GLUCOSE: 226 MG/DL
GFR AFRICAN AMERICAN: >60 ML/MIN
GFR NON-AFRICAN AMERICAN: >60 ML/MIN
GFR SERPL CREATININE-BSD FRML MDRD: ABNORMAL ML/MIN/{1.73_M2}
GFR SERPL CREATININE-BSD FRML MDRD: ABNORMAL ML/MIN/{1.73_M2}
GLUCOSE BLD-MCNC: 313 MG/DL (ref 70–99)
HBA1C MFR BLD: 9.5 % (ref 4.8–5.9)
HCT VFR BLD CALC: 41.4 % (ref 40.7–50.3)
HDLC SERPL-MCNC: 19 MG/DL
HEMOGLOBIN: 12.9 G/DL (ref 13–17)
IMMATURE GRANULOCYTES: 1 %
LDL CHOLESTEROL: 7 MG/DL (ref 0–130)
LYMPHOCYTES # BLD: 26 % (ref 24–43)
MCH RBC QN AUTO: 28.1 PG (ref 25.2–33.5)
MCHC RBC AUTO-ENTMCNC: 31.2 G/DL (ref 28.4–34.8)
MCV RBC AUTO: 90.2 FL (ref 82.6–102.9)
MICROALBUMIN/CREAT 24H UR: 15 MG/L
MICROALBUMIN/CREAT UR-RTO: 14 MCG/MG CREAT
MONOCYTES # BLD: 8 % (ref 3–12)
NRBC AUTOMATED: 0 PER 100 WBC
PDW BLD-RTO: 13 % (ref 11.8–14.4)
PLATELET # BLD: 233 K/UL (ref 138–453)
PLATELET ESTIMATE: ABNORMAL
PMV BLD AUTO: 11.1 FL (ref 8.1–13.5)
POTASSIUM SERPL-SCNC: 5.4 MMOL/L (ref 3.7–5.3)
RBC # BLD: 4.59 M/UL (ref 4.21–5.77)
RBC # BLD: ABNORMAL 10*6/UL
SEG NEUTROPHILS: 62 % (ref 36–65)
SEGMENTED NEUTROPHILS ABSOLUTE COUNT: 6.21 K/UL (ref 1.5–8.1)
SODIUM BLD-SCNC: 137 MMOL/L (ref 135–144)
TOTAL PROTEIN: 6.8 G/DL (ref 6.4–8.3)
TRIGL SERPL-MCNC: 378 MG/DL
VLDLC SERPL CALC-MCNC: ABNORMAL MG/DL (ref 1–30)
WBC # BLD: 10 K/UL (ref 3.5–11.3)
WBC # BLD: ABNORMAL 10*3/UL

## 2018-04-04 PROCEDURE — 80061 LIPID PANEL: CPT

## 2018-04-04 PROCEDURE — 83036 HEMOGLOBIN GLYCOSYLATED A1C: CPT

## 2018-04-04 PROCEDURE — 82570 ASSAY OF URINE CREATININE: CPT

## 2018-04-04 PROCEDURE — 85025 COMPLETE CBC W/AUTO DIFF WBC: CPT

## 2018-04-04 PROCEDURE — 82043 UR ALBUMIN QUANTITATIVE: CPT

## 2018-04-04 PROCEDURE — 80053 COMPREHEN METABOLIC PANEL: CPT

## 2018-04-04 PROCEDURE — 36415 COLL VENOUS BLD VENIPUNCTURE: CPT

## 2018-04-09 ENCOUNTER — OFFICE VISIT (OUTPATIENT)
Dept: PRIMARY CARE CLINIC | Age: 63
End: 2018-04-09
Payer: MEDICARE

## 2018-04-09 VITALS
TEMPERATURE: 97.1 F | BODY MASS INDEX: 37.07 KG/M2 | WEIGHT: 236.2 LBS | SYSTOLIC BLOOD PRESSURE: 133 MMHG | RESPIRATION RATE: 16 BRPM | HEIGHT: 67 IN | HEART RATE: 94 BPM | DIASTOLIC BLOOD PRESSURE: 84 MMHG

## 2018-04-09 DIAGNOSIS — Z23 NEED FOR PROPHYLACTIC VACCINATION AGAINST STREPTOCOCCUS PNEUMONIAE (PNEUMOCOCCUS): ICD-10-CM

## 2018-04-09 DIAGNOSIS — E78.5 DYSLIPIDEMIA: ICD-10-CM

## 2018-04-09 PROCEDURE — 99214 OFFICE O/P EST MOD 30 MIN: CPT | Performed by: NURSE PRACTITIONER

## 2018-04-09 PROCEDURE — 90732 PPSV23 VACC 2 YRS+ SUBQ/IM: CPT | Performed by: NURSE PRACTITIONER

## 2018-04-09 PROCEDURE — 90471 IMMUNIZATION ADMIN: CPT | Performed by: NURSE PRACTITIONER

## 2018-04-09 PROCEDURE — 1036F TOBACCO NON-USER: CPT | Performed by: NURSE PRACTITIONER

## 2018-04-09 PROCEDURE — G8427 DOCREV CUR MEDS BY ELIG CLIN: HCPCS | Performed by: NURSE PRACTITIONER

## 2018-04-09 PROCEDURE — 3046F HEMOGLOBIN A1C LEVEL >9.0%: CPT | Performed by: NURSE PRACTITIONER

## 2018-04-09 PROCEDURE — 3017F COLORECTAL CA SCREEN DOC REV: CPT | Performed by: NURSE PRACTITIONER

## 2018-04-09 PROCEDURE — G8417 CALC BMI ABV UP PARAM F/U: HCPCS | Performed by: NURSE PRACTITIONER

## 2018-04-09 RX ORDER — ATORVASTATIN CALCIUM 40 MG/1
TABLET, FILM COATED ORAL
Qty: 90 TABLET | Refills: 3 | Status: SHIPPED | OUTPATIENT
Start: 2018-04-09 | End: 2018-07-12 | Stop reason: SDUPTHER

## 2018-04-09 RX ORDER — LANCETS 28 GAUGE
1 EACH MISCELLANEOUS 3 TIMES DAILY
Qty: 100 EACH | Refills: 11 | Status: SHIPPED | OUTPATIENT
Start: 2018-04-09 | End: 2018-11-14 | Stop reason: ALTCHOICE

## 2018-04-09 RX ORDER — BLOOD-GLUCOSE METER
1 KIT MISCELLANEOUS DAILY
Qty: 1 KIT | Refills: 0 | Status: SHIPPED | OUTPATIENT
Start: 2018-04-09 | End: 2018-11-14 | Stop reason: ALTCHOICE

## 2018-04-09 ASSESSMENT — ENCOUNTER SYMPTOMS
ABDOMINAL PAIN: 0
SHORTNESS OF BREATH: 0
COUGH: 0
RHINORRHEA: 0
VISUAL CHANGE: 0
WHEEZING: 0
BLURRED VISION: 0
DIARRHEA: 0
NAUSEA: 0
CONSTIPATION: 0
SORE THROAT: 0
VOMITING: 0

## 2018-04-20 ENCOUNTER — HOSPITAL ENCOUNTER (INPATIENT)
Age: 63
LOS: 2 days | Discharge: HOME | DRG: 469 | End: 2018-04-22
Attending: FAMILY MEDICINE | Admitting: INTERNAL MEDICINE
Payer: MEDICARE

## 2018-04-20 ENCOUNTER — HOSPITAL ENCOUNTER (OUTPATIENT)
Age: 63
Discharge: HOME | End: 2018-04-20
Payer: MEDICARE

## 2018-04-20 ENCOUNTER — HOSPITAL ENCOUNTER (EMERGENCY)
Age: 63
Discharge: ANOTHER HOSPITAL | End: 2018-04-20
Payer: MEDICARE

## 2018-04-20 ENCOUNTER — TELEPHONE (OUTPATIENT)
Dept: PRIMARY CARE CLINIC | Age: 63
End: 2018-04-20

## 2018-04-20 VITALS
OXYGEN SATURATION: 95 % | TEMPERATURE: 97.9 F | RESPIRATION RATE: 20 BRPM | HEART RATE: 98 BPM | DIASTOLIC BLOOD PRESSURE: 82 MMHG | SYSTOLIC BLOOD PRESSURE: 128 MMHG

## 2018-04-20 DIAGNOSIS — N17.9 ACUTE KIDNEY INJURY (HCC): Primary | ICD-10-CM

## 2018-04-20 DIAGNOSIS — N18.30 CKD (CHRONIC KIDNEY DISEASE) STAGE 3, GFR 30-59 ML/MIN (HCC): Chronic | ICD-10-CM

## 2018-04-20 DIAGNOSIS — I10 ESSENTIAL HYPERTENSION: ICD-10-CM

## 2018-04-20 LAB
ANION GAP SERPL CALCULATED.3IONS-SCNC: 15 MMOL/L (ref 9–17)
BILIRUBIN URINE: NEGATIVE
BUN BLDV-MCNC: 53 MG/DL (ref 8–23)
BUN/CREAT BLD: 17 (ref 9–20)
CALCIUM IONIZED: 1.31 MMOL/L (ref 1.13–1.33)
CALCIUM SERPL-MCNC: 9.3 MG/DL (ref 8.6–10.4)
CHLORIDE BLD-SCNC: 96 MMOL/L (ref 98–107)
CO2: 24 MMOL/L (ref 20–31)
COLOR: YELLOW
COMMENT UA: ABNORMAL
CREAT SERPL-MCNC: 3.08 MG/DL (ref 0.7–1.2)
CREATININE URINE: 181.6 MG/DL (ref 39–259)
GFR AFRICAN AMERICAN: 25 ML/MIN
GFR NON-AFRICAN AMERICAN: 21 ML/MIN
GFR SERPL CREATININE-BSD FRML MDRD: ABNORMAL ML/MIN/{1.73_M2}
GFR SERPL CREATININE-BSD FRML MDRD: ABNORMAL ML/MIN/{1.73_M2}
GLUCOSE BLD-MCNC: 337 MG/DL (ref 70–99)
GLUCOSE URINE: NEGATIVE
KETONES, URINE: NEGATIVE
LEUKOCYTE ESTERASE, URINE: NEGATIVE
NITRITE, URINE: NEGATIVE
PH UA: 5.5 (ref 5–9)
PHOSPHORUS: 4.9 MG/DL (ref 2.5–4.5)
POTASSIUM SERPL-SCNC: 5 MMOL/L (ref 3.7–5.3)
PROTEIN UA: NEGATIVE
PTH INTACT: 58.87 PG/ML (ref 15–65)
SODIUM BLD-SCNC: 135 MMOL/L (ref 135–144)
SPECIFIC GRAVITY UA: 1.02 (ref 1.01–1.02)
TOTAL PROTEIN, URINE: 10 MG/DL
TURBIDITY: CLEAR
URINE HGB: NEGATIVE
UROBILINOGEN, URINE: NORMAL
VITAMIN D 25-HYDROXY: 18.7 NG/ML (ref 30–100)

## 2018-04-20 PROCEDURE — 99283 EMERGENCY DEPT VISIT LOW MDM: CPT

## 2018-04-20 PROCEDURE — 81003 URINALYSIS AUTO W/O SCOPE: CPT

## 2018-04-20 PROCEDURE — 1200000000 HC SEMI PRIVATE

## 2018-04-20 PROCEDURE — 84100 ASSAY OF PHOSPHORUS: CPT

## 2018-04-20 PROCEDURE — 80048 BASIC METABOLIC PNL TOTAL CA: CPT

## 2018-04-20 PROCEDURE — 84156 ASSAY OF PROTEIN URINE: CPT

## 2018-04-20 PROCEDURE — 83970 ASSAY OF PARATHORMONE: CPT

## 2018-04-20 PROCEDURE — 82330 ASSAY OF CALCIUM: CPT

## 2018-04-20 PROCEDURE — 82570 ASSAY OF URINE CREATININE: CPT

## 2018-04-20 PROCEDURE — 36415 COLL VENOUS BLD VENIPUNCTURE: CPT

## 2018-04-20 PROCEDURE — 82306 VITAMIN D 25 HYDROXY: CPT

## 2018-04-20 RX ORDER — SODIUM CHLORIDE 0.9 % (FLUSH) 0.9 %
10 SYRINGE (ML) INJECTION EVERY 12 HOURS SCHEDULED
Status: DISCONTINUED | OUTPATIENT
Start: 2018-04-20 | End: 2018-04-22 | Stop reason: HOSPADM

## 2018-04-20 RX ORDER — ASPIRIN 81 MG/1
81 TABLET ORAL DAILY
Status: DISCONTINUED | OUTPATIENT
Start: 2018-04-21 | End: 2018-04-22 | Stop reason: HOSPADM

## 2018-04-20 RX ORDER — SODIUM POLYSTYRENE SULFONATE 15 G/60ML
30 SUSPENSION ORAL; RECTAL
Status: ACTIVE | OUTPATIENT
Start: 2018-04-20 | End: 2018-04-20

## 2018-04-20 RX ORDER — ONDANSETRON 2 MG/ML
4 INJECTION INTRAMUSCULAR; INTRAVENOUS EVERY 6 HOURS PRN
Status: DISCONTINUED | OUTPATIENT
Start: 2018-04-20 | End: 2018-04-22 | Stop reason: HOSPADM

## 2018-04-20 RX ORDER — ATORVASTATIN CALCIUM 40 MG/1
40 TABLET, FILM COATED ORAL DAILY
Status: DISCONTINUED | OUTPATIENT
Start: 2018-04-21 | End: 2018-04-22 | Stop reason: HOSPADM

## 2018-04-20 RX ORDER — SODIUM CHLORIDE 9 MG/ML
INJECTION, SOLUTION INTRAVENOUS CONTINUOUS
Status: DISCONTINUED | OUTPATIENT
Start: 2018-04-20 | End: 2018-04-22 | Stop reason: HOSPADM

## 2018-04-20 RX ORDER — DEXTROSE MONOHYDRATE 25 G/50ML
12.5 INJECTION, SOLUTION INTRAVENOUS PRN
Status: DISCONTINUED | OUTPATIENT
Start: 2018-04-20 | End: 2018-04-22 | Stop reason: HOSPADM

## 2018-04-20 RX ORDER — HEPARIN SODIUM 5000 [USP'U]/ML
5000 INJECTION, SOLUTION INTRAVENOUS; SUBCUTANEOUS EVERY 8 HOURS SCHEDULED
Status: DISCONTINUED | OUTPATIENT
Start: 2018-04-20 | End: 2018-04-22 | Stop reason: HOSPADM

## 2018-04-20 RX ORDER — TRAMADOL HYDROCHLORIDE 50 MG/1
100 TABLET ORAL EVERY 6 HOURS PRN
Status: DISCONTINUED | OUTPATIENT
Start: 2018-04-20 | End: 2018-04-22 | Stop reason: HOSPADM

## 2018-04-20 RX ORDER — TRAMADOL HYDROCHLORIDE 50 MG/1
50 TABLET ORAL EVERY 6 HOURS PRN
Status: DISCONTINUED | OUTPATIENT
Start: 2018-04-20 | End: 2018-04-22 | Stop reason: HOSPADM

## 2018-04-20 RX ORDER — DOCUSATE SODIUM 100 MG/1
100 CAPSULE, LIQUID FILLED ORAL 2 TIMES DAILY
Status: DISCONTINUED | OUTPATIENT
Start: 2018-04-20 | End: 2018-04-22 | Stop reason: HOSPADM

## 2018-04-20 RX ORDER — DEXTROSE MONOHYDRATE 50 MG/ML
100 INJECTION, SOLUTION INTRAVENOUS PRN
Status: DISCONTINUED | OUTPATIENT
Start: 2018-04-20 | End: 2018-04-22 | Stop reason: HOSPADM

## 2018-04-20 RX ORDER — SODIUM CHLORIDE 0.9 % (FLUSH) 0.9 %
10 SYRINGE (ML) INJECTION PRN
Status: DISCONTINUED | OUTPATIENT
Start: 2018-04-20 | End: 2018-04-22 | Stop reason: HOSPADM

## 2018-04-20 RX ORDER — SODIUM POLYSTYRENE SULFONATE 15 G/60ML
15 SUSPENSION ORAL; RECTAL
Status: ACTIVE | OUTPATIENT
Start: 2018-04-20 | End: 2018-04-20

## 2018-04-20 RX ORDER — NICOTINE 21 MG/24HR
1 PATCH, TRANSDERMAL 24 HOURS TRANSDERMAL DAILY PRN
Status: DISCONTINUED | OUTPATIENT
Start: 2018-04-20 | End: 2018-04-22 | Stop reason: HOSPADM

## 2018-04-20 RX ORDER — ACETAMINOPHEN 325 MG/1
650 TABLET ORAL EVERY 4 HOURS PRN
Status: DISCONTINUED | OUTPATIENT
Start: 2018-04-20 | End: 2018-04-22 | Stop reason: HOSPADM

## 2018-04-20 RX ORDER — NICOTINE POLACRILEX 4 MG
15 LOZENGE BUCCAL PRN
Status: DISCONTINUED | OUTPATIENT
Start: 2018-04-20 | End: 2018-04-22 | Stop reason: HOSPADM

## 2018-04-20 ASSESSMENT — PAIN DESCRIPTION - PAIN TYPE: TYPE: CHRONIC PAIN

## 2018-04-20 ASSESSMENT — PAIN SCALES - GENERAL
PAINLEVEL_OUTOF10: 9
PAINLEVEL_OUTOF10: 0

## 2018-04-20 ASSESSMENT — ENCOUNTER SYMPTOMS
VOMITING: 0
SHORTNESS OF BREATH: 0
ABDOMINAL PAIN: 0
COUGH: 0
NAUSEA: 0

## 2018-04-20 ASSESSMENT — PAIN DESCRIPTION - LOCATION: LOCATION: GENERALIZED

## 2018-04-20 NOTE — ED PROVIDER NOTES
Neurological: Negative for dizziness, syncope, weakness and numbness. All other systems reviewed and are negative. Review of systems as in HPI & PMH otherwise negative    PHYSICAL EXAM    (up to 7 for level 4, 8 or more for level 5)     ED Triage Vitals [04/20/18 1754]   BP Temp Temp Source Pulse Resp SpO2 Height Weight   128/82 97.9 °F (36.6 °C) Tympanic 98 20 95 % -- --       Physical Exam   Constitutional: He is oriented to person, place, and time. He appears well-developed and well-nourished. HENT:   Head: Normocephalic and atraumatic. Eyes: Conjunctivae are normal. No scleral icterus. Neck: Normal range of motion. Neck supple. No tracheal deviation present. Cardiovascular: Normal rate and regular rhythm. Pulmonary/Chest: Effort normal and breath sounds normal.   Musculoskeletal: Normal range of motion. Neurological: He is alert and oriented to person, place, and time. No cranial nerve deficit. Coordination normal.   Skin: Skin is warm and dry. Psychiatric: He has a normal mood and affect. His behavior is normal. Judgment and thought content normal.   Nursing note and vitals reviewed. DIAGNOSTIC RESULTS     EKG: (none if blank)  All EKG's are interpreted by the Emergency Department Physician who either signs or Co-signs this chart in the absence of a cardiologist.        RADIOLOGY: (none if blank)   Interpretation per the Radiologist below, if available at the time of this note:    No orders to display       LABS:  Labs Reviewed   URINALYSIS - Abnormal; Notable for the following:        Result Value    Specific Gravity, UA 1.025 (*)     All other components within normal limits       All other labs were within normal range or not returned as of this dictation. EMERGENCY DEPARTMENT COURSE and Medical Decision Making:     MDM/  ED Course      Nurse's notes reviewed as were the vital signs. Given his obtain results reviewed old labs reviewed and compared with today's labs.   Patient's

## 2018-04-21 ENCOUNTER — APPOINTMENT (OUTPATIENT)
Dept: ULTRASOUND IMAGING | Age: 63
DRG: 469 | End: 2018-04-21
Attending: FAMILY MEDICINE
Payer: MEDICARE

## 2018-04-21 PROBLEM — E11.42 DIABETIC POLYNEUROPATHY ASSOCIATED WITH TYPE 2 DIABETES MELLITUS (HCC): Status: ACTIVE | Noted: 2018-04-21

## 2018-04-21 PROBLEM — N17.9 ACUTE RENAL FAILURE (ARF) (HCC): Status: RESOLVED | Noted: 2018-04-20 | Resolved: 2018-04-21

## 2018-04-21 PROBLEM — N17.9 AKI (ACUTE KIDNEY INJURY) (HCC): Status: ACTIVE | Noted: 2018-04-21

## 2018-04-21 LAB
-: NORMAL
ALBUMIN SERPL-MCNC: 3.2 G/DL (ref 3.5–5.2)
ALBUMIN/GLOBULIN RATIO: 1.1 (ref 1–2.5)
ALP BLD-CCNC: 86 U/L (ref 40–129)
ALT SERPL-CCNC: 11 U/L (ref 5–41)
AMORPHOUS: NORMAL
ANION GAP SERPL CALCULATED.3IONS-SCNC: 13 MMOL/L (ref 9–17)
AST SERPL-CCNC: 11 U/L
BACTERIA: NORMAL
BILIRUB SERPL-MCNC: 0.26 MG/DL (ref 0.3–1.2)
BILIRUBIN URINE: NEGATIVE
BUN BLDV-MCNC: 40 MG/DL (ref 8–23)
BUN/CREAT BLD: ABNORMAL (ref 9–20)
CALCIUM SERPL-MCNC: 8.5 MG/DL (ref 8.6–10.4)
CASTS UA: NORMAL /LPF (ref 0–8)
CHLORIDE BLD-SCNC: 108 MMOL/L (ref 98–107)
CO2: 21 MMOL/L (ref 20–31)
COLOR: YELLOW
CREAT SERPL-MCNC: 1.63 MG/DL (ref 0.7–1.2)
CRYSTALS, UA: NORMAL /HPF
EPITHELIAL CELLS UA: NORMAL /HPF (ref 0–5)
GFR AFRICAN AMERICAN: 53 ML/MIN
GFR NON-AFRICAN AMERICAN: 43 ML/MIN
GFR SERPL CREATININE-BSD FRML MDRD: ABNORMAL ML/MIN/{1.73_M2}
GFR SERPL CREATININE-BSD FRML MDRD: ABNORMAL ML/MIN/{1.73_M2}
GLUCOSE BLD-MCNC: 110 MG/DL (ref 75–110)
GLUCOSE BLD-MCNC: 193 MG/DL (ref 75–110)
GLUCOSE BLD-MCNC: 205 MG/DL (ref 75–110)
GLUCOSE BLD-MCNC: 217 MG/DL (ref 75–110)
GLUCOSE BLD-MCNC: 227 MG/DL (ref 70–99)
GLUCOSE URINE: NEGATIVE
HCT VFR BLD CALC: 36.2 % (ref 40.7–50.3)
HEMOGLOBIN: 11.5 G/DL (ref 13–17)
INR BLD: 1
KETONES, URINE: NEGATIVE
LEUKOCYTE ESTERASE, URINE: NEGATIVE
MAGNESIUM: 1.8 MG/DL (ref 1.6–2.6)
MCH RBC QN AUTO: 28.4 PG (ref 25.2–33.5)
MCHC RBC AUTO-ENTMCNC: 31.8 G/DL (ref 28.4–34.8)
MCV RBC AUTO: 89.4 FL (ref 82.6–102.9)
MUCUS: NORMAL
NITRITE, URINE: NEGATIVE
NRBC AUTOMATED: 0 PER 100 WBC
OTHER OBSERVATIONS UA: NORMAL
PDW BLD-RTO: 13 % (ref 11.8–14.4)
PH UA: 5 (ref 5–8)
PLATELET # BLD: 216 K/UL (ref 138–453)
PMV BLD AUTO: 11.4 FL (ref 8.1–13.5)
POTASSIUM SERPL-SCNC: 4.8 MMOL/L (ref 3.7–5.3)
PROTEIN UA: NEGATIVE
PROTHROMBIN TIME: 10.5 SEC (ref 9–12)
RBC # BLD: 4.05 M/UL (ref 4.21–5.77)
RBC UA: NORMAL /HPF (ref 0–4)
RENAL EPITHELIAL, UA: NORMAL /HPF
SODIUM BLD-SCNC: 142 MMOL/L (ref 135–144)
SODIUM,UR: 71 MMOL/L
SPECIFIC GRAVITY UA: 1.02 (ref 1–1.03)
TOTAL PROTEIN, URINE: 8 MG/DL
TOTAL PROTEIN: 6 G/DL (ref 6.4–8.3)
TRICHOMONAS: NORMAL
TURBIDITY: CLEAR
URINE HGB: NEGATIVE
UROBILINOGEN, URINE: NORMAL
WBC # BLD: 7 K/UL (ref 3.5–11.3)
WBC UA: NORMAL /HPF (ref 0–5)
YEAST: NORMAL

## 2018-04-21 PROCEDURE — 76770 US EXAM ABDO BACK WALL COMP: CPT

## 2018-04-21 PROCEDURE — 6360000002 HC RX W HCPCS: Performed by: NURSE PRACTITIONER

## 2018-04-21 PROCEDURE — 36415 COLL VENOUS BLD VENIPUNCTURE: CPT

## 2018-04-21 PROCEDURE — 82947 ASSAY GLUCOSE BLOOD QUANT: CPT

## 2018-04-21 PROCEDURE — 84156 ASSAY OF PROTEIN URINE: CPT

## 2018-04-21 PROCEDURE — 81001 URINALYSIS AUTO W/SCOPE: CPT

## 2018-04-21 PROCEDURE — 84300 ASSAY OF URINE SODIUM: CPT

## 2018-04-21 PROCEDURE — 80053 COMPREHEN METABOLIC PANEL: CPT

## 2018-04-21 PROCEDURE — 99223 1ST HOSP IP/OBS HIGH 75: CPT | Performed by: INTERNAL MEDICINE

## 2018-04-21 PROCEDURE — 6370000000 HC RX 637 (ALT 250 FOR IP): Performed by: NURSE PRACTITIONER

## 2018-04-21 PROCEDURE — 2580000003 HC RX 258: Performed by: NURSE PRACTITIONER

## 2018-04-21 PROCEDURE — 85027 COMPLETE CBC AUTOMATED: CPT

## 2018-04-21 PROCEDURE — 1200000000 HC SEMI PRIVATE

## 2018-04-21 PROCEDURE — 83735 ASSAY OF MAGNESIUM: CPT

## 2018-04-21 PROCEDURE — 6370000000 HC RX 637 (ALT 250 FOR IP): Performed by: INTERNAL MEDICINE

## 2018-04-21 PROCEDURE — 85610 PROTHROMBIN TIME: CPT

## 2018-04-21 RX ORDER — INSULIN GLARGINE 100 [IU]/ML
70 INJECTION, SOLUTION SUBCUTANEOUS ONCE
Status: COMPLETED | OUTPATIENT
Start: 2018-04-21 | End: 2018-04-21

## 2018-04-21 RX ADMIN — Medication 10 ML: at 22:47

## 2018-04-21 RX ADMIN — INSULIN LISPRO 4 UNITS: 100 INJECTION, SOLUTION INTRAVENOUS; SUBCUTANEOUS at 17:19

## 2018-04-21 RX ADMIN — VITAMIN D, TAB 1000IU (100/BT) 1000 UNITS: 25 TAB at 08:42

## 2018-04-21 RX ADMIN — ASPIRIN 81 MG: 81 TABLET, COATED ORAL at 08:43

## 2018-04-21 RX ADMIN — INSULIN LISPRO 4 UNITS: 100 INJECTION, SOLUTION INTRAVENOUS; SUBCUTANEOUS at 13:20

## 2018-04-21 RX ADMIN — HEPARIN SODIUM 5000 UNITS: 5000 INJECTION, SOLUTION INTRAVENOUS; SUBCUTANEOUS at 14:01

## 2018-04-21 RX ADMIN — HEPARIN SODIUM 5000 UNITS: 5000 INJECTION, SOLUTION INTRAVENOUS; SUBCUTANEOUS at 22:47

## 2018-04-21 RX ADMIN — DESMOPRESSIN ACETATE 40 MG: 0.2 TABLET ORAL at 08:43

## 2018-04-21 RX ADMIN — LINAGLIPTIN 5 MG: 5 TABLET, FILM COATED ORAL at 08:43

## 2018-04-21 RX ADMIN — HEPARIN SODIUM 5000 UNITS: 5000 INJECTION, SOLUTION INTRAVENOUS; SUBCUTANEOUS at 08:43

## 2018-04-21 RX ADMIN — INSULIN LISPRO 20 UNITS: 100 INJECTION, SOLUTION INTRAVENOUS; SUBCUTANEOUS at 17:15

## 2018-04-21 RX ADMIN — SODIUM CHLORIDE: 9 INJECTION, SOLUTION INTRAVENOUS at 00:09

## 2018-04-21 RX ADMIN — INSULIN GLARGINE 70 UNITS: 100 INJECTION, SOLUTION SUBCUTANEOUS at 13:19

## 2018-04-22 VITALS
OXYGEN SATURATION: 98 % | HEIGHT: 67 IN | TEMPERATURE: 98.1 F | RESPIRATION RATE: 16 BRPM | DIASTOLIC BLOOD PRESSURE: 83 MMHG | HEART RATE: 89 BPM | BODY MASS INDEX: 36.54 KG/M2 | SYSTOLIC BLOOD PRESSURE: 129 MMHG | WEIGHT: 232.8 LBS

## 2018-04-22 PROBLEM — D64.9 NORMOCYTIC NORMOCHROMIC ANEMIA: Status: ACTIVE | Noted: 2018-04-22

## 2018-04-22 PROBLEM — K76.0 FATTY LIVER: Status: ACTIVE | Noted: 2018-04-22

## 2018-04-22 LAB
ANION GAP SERPL CALCULATED.3IONS-SCNC: 11 MMOL/L (ref 9–17)
BUN BLDV-MCNC: 29 MG/DL (ref 8–23)
BUN/CREAT BLD: ABNORMAL (ref 9–20)
CALCIUM SERPL-MCNC: 8.4 MG/DL (ref 8.6–10.4)
CHLORIDE BLD-SCNC: 108 MMOL/L (ref 98–107)
CO2: 22 MMOL/L (ref 20–31)
CREAT SERPL-MCNC: 1.22 MG/DL (ref 0.7–1.2)
GFR AFRICAN AMERICAN: >60 ML/MIN
GFR NON-AFRICAN AMERICAN: >60 ML/MIN
GFR SERPL CREATININE-BSD FRML MDRD: ABNORMAL ML/MIN/{1.73_M2}
GFR SERPL CREATININE-BSD FRML MDRD: ABNORMAL ML/MIN/{1.73_M2}
GLUCOSE BLD-MCNC: 117 MG/DL (ref 70–99)
POTASSIUM SERPL-SCNC: 4.8 MMOL/L (ref 3.7–5.3)
SODIUM BLD-SCNC: 141 MMOL/L (ref 135–144)

## 2018-04-22 PROCEDURE — 6370000000 HC RX 637 (ALT 250 FOR IP): Performed by: INTERNAL MEDICINE

## 2018-04-22 PROCEDURE — 82947 ASSAY GLUCOSE BLOOD QUANT: CPT

## 2018-04-22 PROCEDURE — 6370000000 HC RX 637 (ALT 250 FOR IP): Performed by: NURSE PRACTITIONER

## 2018-04-22 PROCEDURE — 80048 BASIC METABOLIC PNL TOTAL CA: CPT

## 2018-04-22 PROCEDURE — 6360000002 HC RX W HCPCS: Performed by: NURSE PRACTITIONER

## 2018-04-22 PROCEDURE — 99239 HOSP IP/OBS DSCHRG MGMT >30: CPT | Performed by: INTERNAL MEDICINE

## 2018-04-22 PROCEDURE — 36415 COLL VENOUS BLD VENIPUNCTURE: CPT

## 2018-04-22 RX ORDER — LISINOPRIL 5 MG/1
5 TABLET ORAL DAILY
Status: DISCONTINUED | OUTPATIENT
Start: 2018-04-22 | End: 2018-04-22 | Stop reason: HOSPADM

## 2018-04-22 RX ADMIN — VITAMIN D, TAB 1000IU (100/BT) 1000 UNITS: 25 TAB at 09:02

## 2018-04-22 RX ADMIN — INSULIN LISPRO 20 UNITS: 100 INJECTION, SOLUTION INTRAVENOUS; SUBCUTANEOUS at 09:06

## 2018-04-22 RX ADMIN — LINAGLIPTIN 5 MG: 5 TABLET, FILM COATED ORAL at 09:02

## 2018-04-22 RX ADMIN — ASPIRIN 81 MG: 81 TABLET, COATED ORAL at 09:02

## 2018-04-22 RX ADMIN — HEPARIN SODIUM 5000 UNITS: 5000 INJECTION, SOLUTION INTRAVENOUS; SUBCUTANEOUS at 07:07

## 2018-04-22 RX ADMIN — INSULIN LISPRO 20 UNITS: 100 INJECTION, SOLUTION INTRAVENOUS; SUBCUTANEOUS at 13:35

## 2018-04-22 RX ADMIN — DESMOPRESSIN ACETATE 40 MG: 0.2 TABLET ORAL at 09:02

## 2018-04-22 RX ADMIN — INSULIN LISPRO 4 UNITS: 100 INJECTION, SOLUTION INTRAVENOUS; SUBCUTANEOUS at 13:33

## 2018-04-22 RX ADMIN — INSULIN LISPRO 2 UNITS: 100 INJECTION, SOLUTION INTRAVENOUS; SUBCUTANEOUS at 09:03

## 2018-04-22 ASSESSMENT — PAIN SCALES - GENERAL
PAINLEVEL_OUTOF10: 0
PAINLEVEL_OUTOF10: 0

## 2018-04-22 ASSESSMENT — ENCOUNTER SYMPTOMS
SHORTNESS OF BREATH: 0
VOMITING: 0
ABDOMINAL PAIN: 0
SPUTUM PRODUCTION: 0
COUGH: 0
NAUSEA: 0
HEMOPTYSIS: 0

## 2018-04-23 ENCOUNTER — CARE COORDINATION (OUTPATIENT)
Dept: CASE MANAGEMENT | Age: 63
End: 2018-04-23

## 2018-04-23 ENCOUNTER — TELEPHONE (OUTPATIENT)
Dept: PRIMARY CARE CLINIC | Age: 63
End: 2018-04-23

## 2018-04-23 DIAGNOSIS — N17.9 AKI (ACUTE KIDNEY INJURY) (HCC): Primary | Chronic | ICD-10-CM

## 2018-04-23 LAB
GLUCOSE BLD-MCNC: 146 MG/DL (ref 75–110)
GLUCOSE BLD-MCNC: 202 MG/DL (ref 75–110)

## 2018-04-23 PROCEDURE — 1111F DSCHRG MED/CURRENT MED MERGE: CPT | Performed by: NURSE PRACTITIONER

## 2018-04-25 ENCOUNTER — HOSPITAL ENCOUNTER (OUTPATIENT)
Age: 63
Discharge: HOME | End: 2018-04-25
Payer: MEDICARE

## 2018-04-25 DIAGNOSIS — N18.30 CKD (CHRONIC KIDNEY DISEASE) STAGE 3, GFR 30-59 ML/MIN (HCC): Chronic | ICD-10-CM

## 2018-04-25 LAB
ANION GAP SERPL CALCULATED.3IONS-SCNC: 11 MMOL/L (ref 9–17)
BUN BLDV-MCNC: 16 MG/DL (ref 8–23)
BUN/CREAT BLD: 16 (ref 9–20)
CALCIUM SERPL-MCNC: 9.3 MG/DL (ref 8.6–10.4)
CHLORIDE BLD-SCNC: 104 MMOL/L (ref 98–107)
CO2: 26 MMOL/L (ref 20–31)
CREAT SERPL-MCNC: 1.02 MG/DL (ref 0.7–1.2)
GFR AFRICAN AMERICAN: >60 ML/MIN
GFR NON-AFRICAN AMERICAN: >60 ML/MIN
GFR SERPL CREATININE-BSD FRML MDRD: ABNORMAL ML/MIN/{1.73_M2}
GFR SERPL CREATININE-BSD FRML MDRD: ABNORMAL ML/MIN/{1.73_M2}
GLUCOSE BLD-MCNC: 191 MG/DL (ref 70–99)
POTASSIUM SERPL-SCNC: 4.9 MMOL/L (ref 3.7–5.3)
SODIUM BLD-SCNC: 141 MMOL/L (ref 135–144)

## 2018-04-25 PROCEDURE — 80048 BASIC METABOLIC PNL TOTAL CA: CPT

## 2018-04-25 PROCEDURE — 36415 COLL VENOUS BLD VENIPUNCTURE: CPT

## 2018-04-26 ENCOUNTER — CARE COORDINATION (OUTPATIENT)
Dept: CASE MANAGEMENT | Age: 63
End: 2018-04-26

## 2018-05-03 ENCOUNTER — OFFICE VISIT (OUTPATIENT)
Dept: PRIMARY CARE CLINIC | Age: 63
End: 2018-05-03
Payer: MEDICARE

## 2018-05-03 ENCOUNTER — CARE COORDINATION (OUTPATIENT)
Dept: CASE MANAGEMENT | Age: 63
End: 2018-05-03

## 2018-05-03 VITALS
SYSTOLIC BLOOD PRESSURE: 122 MMHG | WEIGHT: 222 LBS | HEART RATE: 92 BPM | BODY MASS INDEX: 34.84 KG/M2 | DIASTOLIC BLOOD PRESSURE: 81 MMHG | TEMPERATURE: 97.7 F | HEIGHT: 67 IN | RESPIRATION RATE: 16 BRPM

## 2018-05-03 DIAGNOSIS — N17.9 AKI (ACUTE KIDNEY INJURY) (HCC): Primary | Chronic | ICD-10-CM

## 2018-05-03 PROCEDURE — 99495 TRANSJ CARE MGMT MOD F2F 14D: CPT | Performed by: NURSE PRACTITIONER

## 2018-05-09 ENCOUNTER — CARE COORDINATION (OUTPATIENT)
Dept: CARE COORDINATION | Age: 63
End: 2018-05-09

## 2018-05-15 ENCOUNTER — CARE COORDINATION (OUTPATIENT)
Dept: CARE COORDINATION | Age: 63
End: 2018-05-15

## 2018-06-06 ENCOUNTER — CARE COORDINATION (OUTPATIENT)
Dept: CARE COORDINATION | Age: 63
End: 2018-06-06

## 2018-06-15 ENCOUNTER — CARE COORDINATION (OUTPATIENT)
Dept: CARE COORDINATION | Age: 63
End: 2018-06-15

## 2018-06-18 ENCOUNTER — CARE COORDINATION (OUTPATIENT)
Dept: CARE COORDINATION | Age: 63
End: 2018-06-18

## 2018-06-19 ENCOUNTER — CARE COORDINATION (OUTPATIENT)
Dept: CARE COORDINATION | Age: 63
End: 2018-06-19

## 2018-06-27 ENCOUNTER — CARE COORDINATION (OUTPATIENT)
Dept: CARE COORDINATION | Age: 63
End: 2018-06-27

## 2018-07-05 ENCOUNTER — CARE COORDINATION (OUTPATIENT)
Dept: CARE COORDINATION | Age: 63
End: 2018-07-05

## 2018-07-06 ENCOUNTER — HOSPITAL ENCOUNTER (OUTPATIENT)
Age: 63
Discharge: HOME | End: 2018-07-06
Payer: MEDICARE

## 2018-07-06 LAB
ABSOLUTE EOS #: 0.28 K/UL (ref 0–0.44)
ABSOLUTE IMMATURE GRANULOCYTE: <0.03 K/UL (ref 0–0.3)
ABSOLUTE LYMPH #: 2.58 K/UL (ref 1.1–3.7)
ABSOLUTE MONO #: 0.66 K/UL (ref 0.1–1.2)
BASOPHILS # BLD: 0 % (ref 0–2)
BASOPHILS ABSOLUTE: 0.03 K/UL (ref 0–0.2)
CREATININE URINE: 152.9 MG/DL (ref 39–259)
DIFFERENTIAL TYPE: NORMAL
EOSINOPHILS RELATIVE PERCENT: 3 % (ref 1–4)
ESTIMATED AVERAGE GLUCOSE: 226 MG/DL
HBA1C MFR BLD: 9.5 % (ref 4.8–5.9)
HCT VFR BLD CALC: 42.4 % (ref 40.7–50.3)
HEMOGLOBIN: 13.3 G/DL (ref 13–17)
IMMATURE GRANULOCYTES: 0 %
LYMPHOCYTES # BLD: 30 % (ref 24–43)
MCH RBC QN AUTO: 28.1 PG (ref 25.2–33.5)
MCHC RBC AUTO-ENTMCNC: 31.4 G/DL (ref 28.4–34.8)
MCV RBC AUTO: 89.6 FL (ref 82.6–102.9)
MICROALBUMIN/CREAT 24H UR: <12 MG/L
MICROALBUMIN/CREAT UR-RTO: NORMAL MCG/MG CREAT
MONOCYTES # BLD: 8 % (ref 3–12)
NRBC AUTOMATED: 0 PER 100 WBC
PDW BLD-RTO: 12.6 % (ref 11.8–14.4)
PLATELET # BLD: 199 K/UL (ref 138–453)
PLATELET ESTIMATE: NORMAL
PMV BLD AUTO: 11.3 FL (ref 8.1–13.5)
RBC # BLD: 4.73 M/UL (ref 4.21–5.77)
RBC # BLD: NORMAL 10*6/UL
SEG NEUTROPHILS: 59 % (ref 36–65)
SEGMENTED NEUTROPHILS ABSOLUTE COUNT: 4.97 K/UL (ref 1.5–8.1)
WBC # BLD: 8.5 K/UL (ref 3.5–11.3)
WBC # BLD: NORMAL 10*3/UL

## 2018-07-06 PROCEDURE — 85025 COMPLETE CBC W/AUTO DIFF WBC: CPT

## 2018-07-06 PROCEDURE — 82043 UR ALBUMIN QUANTITATIVE: CPT

## 2018-07-06 PROCEDURE — 82570 ASSAY OF URINE CREATININE: CPT

## 2018-07-06 PROCEDURE — 36415 COLL VENOUS BLD VENIPUNCTURE: CPT

## 2018-07-06 PROCEDURE — 83036 HEMOGLOBIN GLYCOSYLATED A1C: CPT

## 2018-07-09 ENCOUNTER — CARE COORDINATION (OUTPATIENT)
Dept: CARE COORDINATION | Age: 63
End: 2018-07-09

## 2018-07-09 NOTE — CARE COORDINATION
Ambulatory Care Coordination Note  7/9/2018  CM Risk Score: 6  Oleg Mortality Risk Score:      ACC: Rosemary Kim RN    Summary Note: He called me back. He has been busy since his mom's house has been sold, closing is on 7/22/18 but the new owners want to put in new carpet early so he and his dogs would have to move out. He has a camper but doesn't know if he will be able to get it into a campground, seasonal rentals are probably all taken. He doesn't have any backup plan if he can't get into a campground. He does not have any income to be able to get into a house. He will get some money from the sale of the house, his mom's car and personal items he can sell but he has a lot of debt he needs to pay off plus share the income with 2 siblings. He was visited by a woman named Arabella who helped him apply for assistance with housing, she told him he qualified for that and some other assistance. He didn't know who Arabella was or what agency she was with (possibly [employer]?). He is waiting to hear more from her this week. He stated he didn't need the Leslie[facility] calling him, he thinks all his issues are being handled. Medications reviewed, he has all of his medications. Is taking the Victoza 1.8 mg, the Januvia and the Tresiba 50 units. Is checking his blood sugar daily which is improving, have not had any over 200 in the past 3 weeks. Has lost more weight also. He will attend his PCP appt tomorrow. Denied any issues right now, he is just very busy with packing and takiing care of his mom's things. He is eating regularly, no hypoglycemia symptoms. CC Plan:   - Continue with diabetes education review, assess for needs. Diabetes Assessment    Medic Alert ID:  No  Meal Planning:  None   How often do you test your blood sugar?:  Daily   Do you have barriers with adherence to non-pharmacologic self-management interventions?  (Nutrition/Exercise/Self-Monitoring):  Yes   Have you ever had to go to the ED for symptoms of

## 2018-07-10 ENCOUNTER — OFFICE VISIT (OUTPATIENT)
Dept: PRIMARY CARE CLINIC | Age: 63
End: 2018-07-10
Payer: MEDICARE

## 2018-07-10 VITALS
RESPIRATION RATE: 16 BRPM | DIASTOLIC BLOOD PRESSURE: 83 MMHG | TEMPERATURE: 97.7 F | HEART RATE: 91 BPM | SYSTOLIC BLOOD PRESSURE: 129 MMHG | HEIGHT: 67 IN | BODY MASS INDEX: 33.06 KG/M2 | WEIGHT: 210.6 LBS

## 2018-07-10 DIAGNOSIS — H65.192 ACUTE EFFUSION OF LEFT EAR: ICD-10-CM

## 2018-07-10 DIAGNOSIS — E78.5 DYSLIPIDEMIA: Chronic | ICD-10-CM

## 2018-07-10 DIAGNOSIS — I10 ESSENTIAL HYPERTENSION: Chronic | ICD-10-CM

## 2018-07-10 DIAGNOSIS — Z79.4 UNCONTROLLED TYPE 2 DIABETES MELLITUS WITH CHRONIC KIDNEY DISEASE, WITH LONG-TERM CURRENT USE OF INSULIN, UNSPECIFIED CKD STAGE: ICD-10-CM

## 2018-07-10 DIAGNOSIS — Z79.4 TYPE 2 DIABETES MELLITUS WITH HYPERGLYCEMIA, WITH LONG-TERM CURRENT USE OF INSULIN (HCC): Primary | Chronic | ICD-10-CM

## 2018-07-10 DIAGNOSIS — E11.65 UNCONTROLLED TYPE 2 DIABETES MELLITUS WITH CHRONIC KIDNEY DISEASE, WITH LONG-TERM CURRENT USE OF INSULIN, UNSPECIFIED CKD STAGE: ICD-10-CM

## 2018-07-10 DIAGNOSIS — E78.5 DYSLIPIDEMIA: ICD-10-CM

## 2018-07-10 DIAGNOSIS — E11.65 TYPE 2 DIABETES MELLITUS WITH HYPERGLYCEMIA, WITH LONG-TERM CURRENT USE OF INSULIN (HCC): Primary | Chronic | ICD-10-CM

## 2018-07-10 DIAGNOSIS — E11.22 UNCONTROLLED TYPE 2 DIABETES MELLITUS WITH CHRONIC KIDNEY DISEASE, WITH LONG-TERM CURRENT USE OF INSULIN, UNSPECIFIED CKD STAGE: ICD-10-CM

## 2018-07-10 PROCEDURE — 3017F COLORECTAL CA SCREEN DOC REV: CPT | Performed by: NURSE PRACTITIONER

## 2018-07-10 PROCEDURE — 99214 OFFICE O/P EST MOD 30 MIN: CPT | Performed by: NURSE PRACTITIONER

## 2018-07-10 PROCEDURE — G8427 DOCREV CUR MEDS BY ELIG CLIN: HCPCS | Performed by: NURSE PRACTITIONER

## 2018-07-10 PROCEDURE — 1036F TOBACCO NON-USER: CPT | Performed by: NURSE PRACTITIONER

## 2018-07-10 PROCEDURE — 3046F HEMOGLOBIN A1C LEVEL >9.0%: CPT | Performed by: NURSE PRACTITIONER

## 2018-07-10 PROCEDURE — G8417 CALC BMI ABV UP PARAM F/U: HCPCS | Performed by: NURSE PRACTITIONER

## 2018-07-10 PROCEDURE — 2022F DILAT RTA XM EVC RTNOPTHY: CPT | Performed by: NURSE PRACTITIONER

## 2018-07-10 RX ORDER — ASPIRIN 81 MG/1
TABLET ORAL
Qty: 90 TABLET | Refills: 3 | Status: SHIPPED | OUTPATIENT
Start: 2018-07-10 | End: 2019-07-09 | Stop reason: SDUPTHER

## 2018-07-10 RX ORDER — ATORVASTATIN CALCIUM 40 MG/1
TABLET, FILM COATED ORAL
Qty: 90 TABLET | Refills: 1 | OUTPATIENT
Start: 2018-07-10

## 2018-07-10 RX ORDER — MELATONIN
Qty: 90 TABLET | Refills: 3 | Status: SHIPPED | OUTPATIENT
Start: 2018-07-10 | End: 2019-07-09 | Stop reason: SDUPTHER

## 2018-07-10 ASSESSMENT — ENCOUNTER SYMPTOMS
CONSTIPATION: 0
SHORTNESS OF BREATH: 1
COUGH: 0
NAUSEA: 0
ABDOMINAL PAIN: 0
RHINORRHEA: 0
SORE THROAT: 0
VOMITING: 0
WHEEZING: 0
DIARRHEA: 0

## 2018-07-10 NOTE — PROGRESS NOTES
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 Authorizing Provider   aspirin 81 MG EC tablet TAKE ONE (1) TABLET BY MOUTH ONCE DAILY 7/10/18  Yes Romel Ford MightRAY CNP   Cholecalciferol (VITAMIN D3) 1000 units TABS TAKE (1) TABLET BY MOUTH DAILY 7/10/18  Yes Romel Fisher, RAY - CNP   SITagliptin (JANUVIA) 50 MG tablet Take 1 tablet by mouth daily 5/3/18  Yes Romel Ford MightRAY CNP   Liraglutide (VICTOZA) 18 MG/3ML SOPN SC injection Inject 1.8 mg into the skin daily Increase to 1.2 mg daily after 1 week  Patient taking differently: Inject 1.8 mg into the skin daily Start with 0.6 mg x 1 week, Increase to 1.2 mg daily after 10 days, then increase to 1.8 mg daily. 5/3/18  Yes Romel Fisher, RAY Campbell CNP   Insulin Degludec (TRESIBA FLEXTOUCH) 200 UNIT/ML SOPN Inject 50 Units into the skin daily 4/9/18  Yes RAY Quintanilla - GABRIELLA   FREESTYLE LANCETS MISC 1 each by Does not apply route 3 times daily 4/9/18  Yes RAY Quintanilla CNP   glucose monitoring kit (FREESTYLE) monitoring kit 1 kit by Does not apply route daily 4/9/18  Yes RAY Quintanilla CNP   glucose blood VI test strips (FREESTYLE LITE) strip 1 each by In Vitro route 3 times daily As needed.  4/9/18  Yes RAY Quintanilla CNP   Insulin Pen Needle 31G X 8 MM MISC 1 each by Does not apply route 5 times daily Diabetes type 2, uncontrolled E11.41, E11.65, Z79.4 4/9/18  Yes RAY Austin CNP   atorvastatin (LIPITOR) 40 MG tablet TAKE ONE TABLET BY MOUTH DAILY 4/9/18  Yes RAY Austin - CNP   lisinopril (PRINIVIL;ZESTRIL) 5 MG tablet TAKE 1 TABLET BY MOUTH DAILY 3/14/18  Yes Sridhar Lazar MD   Alcohol Swabs (EASY TOUCH ALCOHOL PREP MEDIUM) 70 % PADS USE TO TEST TWICE DAILY 5/15/17  Yes Historical Provider, MD        Allergies:       Patient has no known allergies. Social History:     Tobacco:    reports that he quit smoking about 2 years ago. His smoking use included Cigars. He smoked 0.00 packs per day for 25.00 years. He has never used smokeless tobacco.  Alcohol:      reports that he does not drink alcohol. Drug Use:  reports that he does not use drugs. Family History:     Family History   Problem Relation Age of Onset    Diabetes Mother     Heart Disease Father     Diabetes Brother     Diabetes Brother     Stroke Other     Diabetes Other     Arthritis Other        Review of Systems:     Positive and Negative as described in HPI    Review of Systems   Constitutional: Negative for chills, fatigue and fever. HENT: Positive for ear pain. Negative for congestion, rhinorrhea and sore throat. Eyes: Negative for visual disturbance. Respiratory: Positive for shortness of breath. Negative for cough and wheezing. Cardiovascular: Negative for chest pain and palpitations. Gastrointestinal: Negative for abdominal pain, constipation, diarrhea, nausea and vomiting. Endocrine: Negative for polydipsia, polyphagia and polyuria. Genitourinary: Negative for difficulty urinating and dysuria. Musculoskeletal: Negative for gait problem. Skin: Negative for rash. Neurological: Positive for dizziness and numbness. Negative for syncope, light-headedness and headaches.        Physical Exam:   Vitals:  /83 (Site: Left Arm, Position: Sitting, Cuff Size: Medium Adult)   Pulse 91   Temp 97.7 °F (36.5 °C) (Temporal)   Resp 16   Ht 5' 7\" (1.702 m)   Wt 210 lb 9.6 oz (95.5 kg)   BMI 32.98 kg/m²     Physical Exam   Constitutional: He is oriented to person, place, and time. He appears well-developed and well-nourished. No distress. HENT:   Right Ear: Hearing, tympanic membrane and ear canal normal. No middle ear effusion. Left Ear: Hearing, tympanic membrane and ear canal normal.   Mouth/Throat: Oropharynx is clear and moist.   Eyes: Conjunctivae are normal. No scleral icterus. Neck: Normal range of motion. Neck supple. Cardiovascular: Normal rate, regular rhythm and normal heart sounds. Pulmonary/Chest: Effort normal and breath sounds normal. He has no wheezes. Abdominal: Soft. Bowel sounds are normal. He exhibits no distension. There is no tenderness. Obese abdomen   Musculoskeletal: He exhibits no edema. Neurological: He is alert and oriented to person, place, and time. Skin: Skin is warm and dry. No rash noted. Psychiatric: He has a normal mood and affect. His behavior is normal.   Nursing note and vitals reviewed.       Data:     Lab Results   Component Value Date     04/25/2018    K 4.9 04/25/2018     04/25/2018    CO2 26 04/25/2018    BUN 16 04/25/2018    CREATININE 1.02 04/25/2018    GLUCOSE 191 04/25/2018    GLUCOSE 200 03/13/2012    PROT 6.0 04/21/2018    LABALBU 3.2 04/21/2018    LABALBU 4.2 03/13/2012    BILITOT 0.26 04/21/2018    ALKPHOS 86 04/21/2018    AST 11 04/21/2018    ALT 11 04/21/2018     Lab Results   Component Value Date    WBC 8.5 07/06/2018    RBC 4.73 07/06/2018    RBC 5.26 02/22/2012    HGB 13.3 07/06/2018    HCT 42.4 07/06/2018    MCV 89.6 07/06/2018    MCH 28.1 07/06/2018    MCHC 31.4 07/06/2018    RDW 12.6 07/06/2018     07/06/2018     02/22/2012    MPV 11.3 07/06/2018     Lab Results   Component Value Date    TSH 2.92 12/11/2016     Lab Results   Component Value Date    CHOL 102 04/04/2018    HDL 19 04/04/2018    PSA 2.48

## 2018-07-10 NOTE — TELEPHONE ENCOUNTER
Gout     Essential hypertension     BLANE (acute kidney injury) ([facility] [address]     CKD (chronic kidney disease) stage 3, GFR 30-59 ml/min     BLANE (acute kidney injury) ([facility] [address]     Class 2 obesity due to excess calories with serious comorbidity and body mass index (BMI) of 35.0 to 35.9 in adult     Diabetic polyneuropathy associated with type 2 diabetes mellitus ([facility] [address]     Fatty liver     Normocytic normochromic anemia

## 2018-07-12 DIAGNOSIS — E78.5 DYSLIPIDEMIA: ICD-10-CM

## 2018-07-12 RX ORDER — ATORVASTATIN CALCIUM 40 MG/1
TABLET, FILM COATED ORAL
Qty: 90 TABLET | Refills: 3 | Status: SHIPPED | OUTPATIENT
Start: 2018-07-12 | End: 2019-07-09 | Stop reason: SDUPTHER

## 2018-07-12 NOTE — TELEPHONE ENCOUNTER
current use of insulin (HCC)     Dyslipidemia     Gout     Essential hypertension     BLANE (acute kidney injury) ([facility] [address]     CKD (chronic kidney disease) stage 3, GFR 30-59 ml/min     BLANE (acute kidney injury) ([facility] [address]     Class 2 obesity due to excess calories with serious comorbidity and body mass index (BMI) of 35.0 to 35.9 in adult     Diabetic polyneuropathy associated with type 2 diabetes mellitus ([facility] [address]     Fatty liver     Normocytic normochromic anemia

## 2018-08-03 ENCOUNTER — CARE COORDINATION (OUTPATIENT)
Dept: CARE COORDINATION | Age: 63
End: 2018-08-03

## 2018-08-03 NOTE — CARE COORDINATION
Ambulatory Care Coordination Note  8/3/2018  CM Risk Score: 6  Oleg Mortality Risk Score:      ACC: Keshav Rider RN    Summary Note: Spoke with Umu Causey for CC follow up of DM. Umu Causey states his blood sugars have been \"really low, around the 140's to 150's. \" He states he has been around 200-250 for years and nothing would bring it down and now that he is on the [place], his blood sugar is much lower. He states 140-150's makes him feel \"like crap,\" explaining that he is more tired, lost 25 pounds and his neuropathy is much worse. RNCC encouraged Umu Causey to stick with it; these symptoms will pass as he becomes used to the lower blood sugars. Discussed complications of hyperglycemia like stroke, kidney failure, etc and Umu Causey states, \"Believe me I know-my kidneys shut down twice. \" Umu Causey has food stamps and states he is able to afford his food and is trying to eat 6 small healthy meals per day. Umu Causey states he has nowhere to go when he is kicked out of his home, which is under foreclosure now. He says he has a  with his insurance who is finding housing for him. Umu Causey does not have her contact information. Umu Causey denies any other needs at this time. CC Plan: Follow up PC in 3-4 weeks. Diabetes Assessment    Medic Alert ID:  No  Meal Planning:  None   How often do you test your blood sugar?:  Daily   Do you have barriers with adherence to non-pharmacologic self-management interventions?  (Nutrition/Exercise/Self-Monitoring):  Yes   Have you ever had to go to the ED for symptoms of low blood sugar?:  No       No patient-reported symptoms   Do you have hyperglycemia symptoms?:  No   Do you have hypoglycemia symptoms?:  No   Last Blood Sugar Value:  140   Blood Sugar Monitoring Regimen:  Morning Fasting   Blood Sugar Trends:  Steady Decrease          Care Coordination Interventions    Program Enrollment:  Complex Care  Referral from Primary Care Provider:  No  Suggested Interventions and Community Resources  Diabetes Education:  Declined  Registered Dietician:  Declined  Social Work:  Completed (Comment: for medications, housing/utility assistance)  Zone Management Tools:  Completed (Comment: DM)         Goals Addressed             Most Recent     Self Monitoring   On track (8/3/2018)             Self-Monitored Blood Glucose - I will check my blood sugar Fasting blood sugar  I will notify my provider of any trends of increasing or decreasing blood sugars over a 1 month period. I will notify my provider if I have any blood sugar readings less than 70 more than 2 times a month. None Recently Recorded    Barriers: none  Plan for overcoming my barriers: N/A  Confidence: 8/10  Anticipated Goal Completion Date: 3 months              Prior to Admission medications    Medication Sig Start Date End Date Taking? Authorizing Provider   atorvastatin (LIPITOR) 40 MG tablet TAKE ONE TABLET BY MOUTH DAILY 7/12/18   RAY Robb CNP   aspirin 81 MG EC tablet TAKE ONE (1) TABLET BY MOUTH ONCE DAILY 7/10/18   RAY Robb CNP   Cholecalciferol (VITAMIN D3) 1000 units TABS TAKE (1) TABLET BY MOUTH DAILY 7/10/18   RAY Robb CNP   SITagliptin (JANUVIA) 50 MG tablet Take 1 tablet by mouth daily 5/3/18   RAY Robb CNP   Liraglutide (VICTOZA) 18 MG/3ML SOPN SC injection Inject 1.8 mg into the skin daily Increase to 1.2 mg daily after 1 week  Patient taking differently: Inject 1.8 mg into the skin daily Start with 0.6 mg x 1 week, Increase to 1.2 mg daily after 10 days, then increase to 1.8 mg daily.  5/3/18   RAY Robb CNP   Insulin Degludec (TRESIBA FLEXTOUCH) 200 UNIT/ML SOPN Inject 50 Units into the skin daily 4/9/18   RAY Robb CNP   FREESTYLE LANCETS MISC 1 each by Does not apply route 3 times daily 4/9/18   RAY Robb CNP   glucose monitoring kit (FREESTYLE) monitoring kit 1 kit by Does not apply route daily 4/9/18   RAY Robb CNP

## 2018-08-21 ENCOUNTER — CARE COORDINATION (OUTPATIENT)
Dept: CARE COORDINATION | Age: 63
End: 2018-08-21

## 2018-08-21 ENCOUNTER — HOSPITAL ENCOUNTER (EMERGENCY)
Age: 63
Discharge: HOME | End: 2018-08-21
Attending: EMERGENCY MEDICINE
Payer: MEDICARE

## 2018-08-21 VITALS
SYSTOLIC BLOOD PRESSURE: 147 MMHG | TEMPERATURE: 97.1 F | RESPIRATION RATE: 17 BRPM | DIASTOLIC BLOOD PRESSURE: 106 MMHG | OXYGEN SATURATION: 100 % | HEART RATE: 102 BPM

## 2018-08-21 DIAGNOSIS — G89.29 ACUTE EXACERBATION OF CHRONIC LOW BACK PAIN: Primary | ICD-10-CM

## 2018-08-21 DIAGNOSIS — M54.50 ACUTE EXACERBATION OF CHRONIC LOW BACK PAIN: Primary | ICD-10-CM

## 2018-08-21 DIAGNOSIS — M99.03 SOMATIC DYSFUNCTION OF LUMBAR REGION: ICD-10-CM

## 2018-08-21 PROCEDURE — 6360000002 HC RX W HCPCS: Performed by: EMERGENCY MEDICINE

## 2018-08-21 PROCEDURE — 99282 EMERGENCY DEPT VISIT SF MDM: CPT

## 2018-08-21 PROCEDURE — 96372 THER/PROPH/DIAG INJ SC/IM: CPT

## 2018-08-21 RX ORDER — KETOROLAC TROMETHAMINE 15 MG/ML
15 INJECTION, SOLUTION INTRAMUSCULAR; INTRAVENOUS ONCE
Status: COMPLETED | OUTPATIENT
Start: 2018-08-21 | End: 2018-08-21

## 2018-08-21 RX ADMIN — KETOROLAC TROMETHAMINE 15 MG: 15 INJECTION, SOLUTION INTRAMUSCULAR; INTRAVENOUS at 15:04

## 2018-08-21 ASSESSMENT — PAIN DESCRIPTION - ORIENTATION: ORIENTATION: LOWER

## 2018-08-21 ASSESSMENT — PAIN DESCRIPTION - LOCATION: LOCATION: BACK

## 2018-08-21 ASSESSMENT — PAIN SCALES - GENERAL
PAINLEVEL_OUTOF10: 10
PAINLEVEL_OUTOF10: 10

## 2018-08-21 ASSESSMENT — PAIN DESCRIPTION - PAIN TYPE: TYPE: ACUTE PAIN

## 2018-08-21 ASSESSMENT — PAIN DESCRIPTION - DESCRIPTORS: DESCRIPTORS: ACHING

## 2018-08-21 NOTE — ED PROVIDER NOTES
[facility] ED  eMERGENCY dEPARTMENT eNCOUnter      Pt Name: Brandi Agrawal  MRN: [id number]  [place] 1955  Date of evaluation: 2018  Provider: Rj Soto Dr       Chief Complaint   Patient presents with    Back Pain     low back, has chronic back pain, this feels different, dizzy         HISTORY OF PRESENT ILLNESS   (Location/Symptom, Timing/Onset, Context/Setting, Quality, Duration, Modifying Factors, Severity) Note limiting factors. HPI    Brandi Agrawal is a 63 y.o. male who presents to the emergency department Nontoxic well appearing patient presents to the emergency department for acute on chronic exacerbation of back pain. He states he does not feel dizzy me. He is walking up and down the hallway no difficulty. He is able to get himself coffee and is drinking it. He states his mom is recently . Patient states he has been lifting and moving things out of the house. He states when he lifted up a piece of furniture, he felt  his left lower back spasm. Denies abdominal pain, no bladder incontinence. Patient states he is not drug-seeking. He states that he threw away his pain pills and muscle relaxer in the past.  He has no difficulty urination. No fever or chills. No heart pain chest pain or shortness of breath. No bowel or bladder incontinence. He has full range of motion extremely nontoxic. I advised the patient he should use ibuprofen, low-dose NSAID for short trem, chiropractic physical therapy and follow-up with the primary care doctor. He states he has plenty of doctors to give follow up with. Nursing Notes were reviewed. REVIEW OF SYSTEMS    (2+ for level 4; 10+ for level 5)   Review of Systems   systems reviewed and otherwise acutely negative except as in the [address]     PAST MEDICAL HISTORY     Past Medical History:   Diagnosis Date    Acute kidney injury ([address]     Chronic kidney disease     Diabetic neuropathy ([address]      Years of education: N/A     Social History Main Topics    Smoking status: Former Smoker     Packs/day: 0.00     Years: 25.00     Types: Cigars     Quit date: 10/2/2015    Smokeless tobacco: Never Used      Comment: trying to quit, smokes little cigars    Alcohol use No    Drug use: No    Sexual activity: Yes     Partners: Female     Other Topics Concern    Not on file     Social History Narrative    No narrative on file       SCREENINGS           PHYSICAL EXAM    (up to 7 for level 4, 8 or more for level 5)     ED Triage Vitals   BP Temp Temp Source Pulse Resp SpO2 Height Weight   08/21/18 1327 08/21/18 1326 08/21/18 1326 08/21/18 1327 08/21/18 1327 08/21/18 1327 -- --   (!) 147/106 97.1 °F (36.2 °C) Tympanic 102 17 100 %       ED Triage Vitals   Enc Vitals Group      BP 08/21/18 1327 (!) 147/106      Pulse 08/21/18 1327 102      Resp 08/21/18 1327 17      Temp 08/21/18 1326 97.1 °F (36.2 °C)      Temp Source 08/21/18 1326 Tympanic      SpO2 08/21/18 1327 100 %      Weight --       Height --       Head Circumference --       Peak Flow --       Pain Score --       Pain Loc --       Pain Edu? --       Excl. in [address]? --      Physical Exam    GENERAL APPEARANCE: Awake and alert. Cooperative. No acute distress. EYES: Sclera anicteric. ENT: moist mucous membranes,  drinking coffee  NECK: Supple. Trachea midline. No nuchal rigidity or  meningismus  CARDIO: RRR. Bilateral, radial pulses 2+ and equal.   LUNGS: Respirations unlabored. CTAB. no rhonchi or wheezing or rales  ABDOMEN: Soft. Non-distended. Non-tender. No rebound no guarding no peritoneal findings. No pulsatile masses no bruits  EXTREMITIES: No acute deformities. Wearing flip-flops normal range of motion and normal gait  SKIN: Warm and dry. No petechiae/ purpura  NEUROLOGICAL: No gross facial drooping. Moves all 4 extremities spontaneously. Bilateral patellar reflexes are equal, normal gait  PSYCHIATRIC: Normal mood.  Pleasant affect  BACK: No CVA complaint    Ousmane Loza  [address] [address]  [phone]    Go today  For reevaluation of today's complaint      DISCHARGE MEDICATIONS:  New Prescriptions    No medications on file                Summation      Patient Course: See HPI and MDM       ED Medications administered this visit:    Medications   ketorolac (TORADOL) injection 15 mg (not administered)       New Prescriptions from this visit:    New Prescriptions    No medications on file       Follow-up:  [address] [facility]  [address] [facility]  [phone]    Schedule an appointment as soon as possible for a visit in 2 days  For reevaluation of today's complaint    Ousmane Loza  [address] [address]  [phone]    Go today  For reevaluation of today's complaint        Final Impression:   1. Acute exacerbation of chronic low back pain    2. Somatic dysfunction of lumbar region                 (Please note:  Portions of this note were completed with a voice recognition program.  Efforts were made to edit the dictations but occasionally words and phrases are mis-transcribed.)  Form v2016. [id number]    DO Eddi Singletary (electronically signed)  Emergency Medicine Provider          Latrice Mcdonald DO  08/21/18 [id number]

## 2018-08-22 ENCOUNTER — CARE COORDINATION (OUTPATIENT)
Dept: CARE COORDINATION | Age: 63
End: 2018-08-22

## 2018-08-22 NOTE — CARE COORDINATION
Ambulatory Care Coordination Note  8/22/2018  CM Risk Score: 6  Oleg Mortality Risk Score:      ACC: Edvin Ann RN    Summary Note: Spoke with Vinod Tan for ED follow up and CC follow up of DM. Vinod Tan was in the ED yesterday with exacerbation of chronic low back pain. He received an injection of Toradol. He is currently at the chiropractor's office waiting to be seen. Vinod Tan states his blood sugars are back up to 200-230 after being down to 140-160 the last time [address] called. When [address] asked Vinod Tan what he was doing differently, he states he was really careful with his carbs before and now his diet is not so good and he is eating a lot. He states he is still taking all of his meds as directed. RNCC discussed counting carbs/plate method with Vinod Tan, but he states, \" I don't eat a lot of carbs. \" Diabetic booklets on diet and carb counting mailed to Vinod Tan. CC Plan: Follow up in one week to check on back pain and DM. Diabetes Assessment    Medic Alert ID:  No  Meal Planning:  None   How often do you test your blood sugar?:  Daily   Do you have barriers with adherence to non-pharmacologic self-management interventions?  (Nutrition/Exercise/Self-Monitoring):  Yes   Have you ever had to go to the ED for symptoms of low blood sugar?:  No       No patient-reported symptoms   Do you have hyperglycemia symptoms?:  No   Do you have hypoglycemia symptoms?:  No   Last Blood Sugar Value:  209   Blood Sugar Monitoring Regimen:  Before Meals   Blood Sugar Trends:  Steady Increase                Care Coordination Interventions    Program Enrollment:  Complex Care  Referral from Primary Care Provider:  No  Suggested Interventions and Community Resources  Diabetes Education:  Declined  Registered Dietician:  Declined  Social Work:  Completed (Comment: for medications, housing/utility assistance)  Zone Management Tools:  Completed (Comment: DM)         Goals Addressed             Most Recent     Self Monitoring   On track (8/22/2018) Self-Monitored Blood Glucose - I will check my blood sugar Fasting blood sugar  I will notify my provider of any trends of increasing or decreasing blood sugars over a 1 month period. I will notify my provider if I have any blood sugar readings less than 70 more than 2 times a month. None Recently Recorded    Barriers: none  Plan for overcoming my barriers: N/A  Confidence: 8/10  Anticipated Goal Completion Date: 3 months              Prior to Admission medications    Medication Sig Start Date End Date Taking? Authorizing Provider   atorvastatin (LIPITOR) 40 MG tablet TAKE ONE TABLET BY MOUTH DAILY 7/12/18   Elnoria Mode Might, APRN - CNP   aspirin 81 MG EC tablet TAKE ONE (1) TABLET BY MOUTH ONCE DAILY 7/10/18   Elnoria Mode Might, APRN - CNP   Cholecalciferol (VITAMIN D3) 1000 units TABS TAKE (1) TABLET BY MOUTH DAILY 7/10/18   Elnoria Mode Might, APRN - CNP   SITagliptin (JANUVIA) 50 MG tablet Take 1 tablet by mouth daily 5/3/18   Elnoria Mode Might, APRN - CNP   Liraglutide (VICTOZA) 18 MG/3ML SOPN SC injection Inject 1.8 mg into the skin daily Increase to 1.2 mg daily after 1 week  Patient taking differently: Inject 1.8 mg into the skin daily Start with 0.6 mg x 1 week, Increase to 1.2 mg daily after 10 days, then increase to 1.8 mg daily. 5/3/18   Elnoria Mode Might, APRN - CNP   Insulin Degludec (TRESIBA FLEXTOUCH) 200 UNIT/ML SOPN Inject 50 Units into the skin daily 4/9/18   Elnoria Mode Might, APRN - CNP   FREESTYLE LANCETS MISC 1 each by Does not apply route 3 times daily 4/9/18   Elnoria Mode Might, APRN - CNP   glucose monitoring kit (FREESTYLE) monitoring kit 1 kit by Does not apply route daily 4/9/18   Elnoria Mode Might, APRN - CNP   glucose blood VI test strips (FREESTYLE LITE) strip 1 each by In Vitro route 3 times daily As needed.  4/9/18   Elnoria Mode Might, APRN - CNP   Insulin Pen Needle 31G X 8 MM MISC 1 each by Does not apply route 5 times daily Diabetes type 2, uncontrolled E11.41, E11.65, Z79.4 4/9/18   Elnoria Mode Might, APRN - CNP   lisinopril (PRINIVIL;ZESTRIL) 5 MG tablet TAKE 1 TABLET BY MOUTH DAILY 3/14/18   Aryan Heaton MD   Alcohol Swabs (EASY TOUCH ALCOHOL PREP MEDIUM) 70 % PADS USE TO TEST TWICE DAILY 5/15/17   Historical Provider, MD       Future Appointments  Date Time Provider Penelope Vero   10/22/2018 12:00 PM Aryan Heaton MD [facility] None   11/14/2018 1:00 PM RAY Rivera - CNP [facility]

## 2018-08-27 ENCOUNTER — CARE COORDINATION (OUTPATIENT)
Dept: CARE COORDINATION | Age: 63
End: 2018-08-27

## 2018-08-27 NOTE — CARE COORDINATION
attempted to reach Mathieu Bussing however no answer. No voicemail setup. Unable to leave a message.

## 2018-09-04 ENCOUNTER — CARE COORDINATION (OUTPATIENT)
Dept: CARE COORDINATION | Age: 63
End: 2018-09-04

## 2018-09-04 NOTE — CARE COORDINATION
Attempted to reach Alana Shall for CC follow up of DM and to discuss the diabetes education that was mailed to him. Yasmany's phone recording states that he is \"not available at this time. \"  CC Plan: Attempt to reach Alana Hansen next week.

## 2018-10-02 RX ORDER — INSULIN DEGLUDEC 200 U/ML
50 INJECTION, SOLUTION SUBCUTANEOUS DAILY
Qty: 9 ML | Refills: 10 | Status: SHIPPED | OUTPATIENT
Start: 2018-10-02 | End: 2018-11-14 | Stop reason: SDUPTHER

## 2018-10-02 RX ORDER — LIRAGLUTIDE 6 MG/ML
INJECTION SUBCUTANEOUS
Qty: 9 ML | Refills: 10 | Status: SHIPPED | OUTPATIENT
Start: 2018-10-02 | End: 2019-08-20 | Stop reason: SDUPTHER

## 2018-10-02 NOTE — TELEPHONE ENCOUNTER
Health Maintenance   Topic Date Due    Diabetic foot exam  11/03/2017    Colon Cancer Screen FIT/FOBT  06/29/2018    Flu vaccine (1) 09/01/2018    A1C test (Diabetic or Prediabetic)  10/06/2018    Diabetic retinal exam  04/09/2019 (Originally 12/13/2017)    Shingles Vaccine (1 of 2 - 2 Dose Series) 04/09/2019 (Originally 11/15/2007)    Lipid screen  04/04/2019    Potassium monitoring  04/25/2019    Creatinine monitoring  04/25/2019    DTaP/Tdap/Td vaccine (2 - Td) 10/02/2025    Pneumococcal med risk  Completed    Hepatitis C screen  Completed    HIV screen  Completed             (applicable per patient's age: Cancer Screenings, Depression Screening, Fall Risk Screening, Immunizations)    Hemoglobin A1C (%)   Date Value   07/06/2018 9.5 (H)   04/04/2018 9.5 (H)   09/29/2017 9.8 (H)     Microalb/Crt.  Ratio (mcg/mg creat)   Date Value   07/06/2018 CANNOT BE CALCULATED     LDL Cholesterol (mg/dL)   Date Value   04/04/2018 7     AST (U/L)   Date Value   04/21/2018 11     ALT (U/L)   Date Value   04/21/2018 11     BUN (mg/dL)   Date Value   04/25/2018 16      (goal A1C is < 7)   (goal LDL is <100) need 30-50% reduction from baseline     BP Readings from Last 3 Encounters:   08/21/18 (!) 147/106   07/10/18 129/83   05/03/18 122/81    (goal /80)      All Future Testing planned in [facility]:  Lab Frequency Next Occurrence   Basic Metabolic Panel Once 04/80/9073   PTH, INTACT WITH IONIZED CALCIUM Once 10/22/2018   Phosphorus Once 10/22/2018   Vitamin D 25 Hydroxy Once 10/22/2018   Creatinine, Random Urine Once 10/22/2018   Protein, urine, random Once 10/22/2018       Next Visit Date:  Future Appointments  Date Time Provider Penelope Pham   10/22/2018 12:00 PM Caroline Angel MD [facility] None   11/14/2018 1:00 PM Sophia Fisher APRN - CNP [facility]            Patient Active Problem List:     Type 2 diabetes mellitus with hyperglycemia, with long-term current use of insulin ([address]

## 2018-10-12 ENCOUNTER — CARE COORDINATION (OUTPATIENT)
Dept: CARE COORDINATION | Age: 63
End: 2018-10-12

## 2018-10-22 ENCOUNTER — HOSPITAL ENCOUNTER (OUTPATIENT)
Age: 63
Discharge: HOME | End: 2018-10-22
Payer: MEDICARE

## 2018-10-22 DIAGNOSIS — N18.30 CKD (CHRONIC KIDNEY DISEASE) STAGE 3, GFR 30-59 ML/MIN (HCC): Chronic | ICD-10-CM

## 2018-10-22 LAB
ANION GAP SERPL CALCULATED.3IONS-SCNC: 11 MMOL/L (ref 9–17)
BUN BLDV-MCNC: 36 MG/DL (ref 8–23)
BUN/CREAT BLD: 19 (ref 9–20)
CALCIUM IONIZED: 1.26 MMOL/L (ref 1.13–1.33)
CALCIUM SERPL-MCNC: 8.7 MG/DL (ref 8.6–10.4)
CHLORIDE BLD-SCNC: 106 MMOL/L (ref 98–107)
CO2: 23 MMOL/L (ref 20–31)
CREAT SERPL-MCNC: 1.92 MG/DL (ref 0.7–1.2)
CREATININE URINE: 203.3 MG/DL (ref 39–259)
GFR AFRICAN AMERICAN: 44 ML/MIN
GFR NON-AFRICAN AMERICAN: 36 ML/MIN
GFR SERPL CREATININE-BSD FRML MDRD: ABNORMAL ML/MIN/{1.73_M2}
GFR SERPL CREATININE-BSD FRML MDRD: ABNORMAL ML/MIN/{1.73_M2}
GLUCOSE BLD-MCNC: 147 MG/DL (ref 70–99)
PHOSPHORUS: 2.6 MG/DL (ref 2.5–4.5)
POTASSIUM SERPL-SCNC: 4.8 MMOL/L (ref 3.7–5.3)
PTH INTACT: 141.1 PG/ML (ref 15–65)
SODIUM BLD-SCNC: 140 MMOL/L (ref 135–144)
TOTAL PROTEIN, URINE: 9 MG/DL
VITAMIN D 25-HYDROXY: 27.3 NG/ML (ref 30–100)

## 2018-10-22 PROCEDURE — 84100 ASSAY OF PHOSPHORUS: CPT

## 2018-10-22 PROCEDURE — 82570 ASSAY OF URINE CREATININE: CPT

## 2018-10-22 PROCEDURE — 84156 ASSAY OF PROTEIN URINE: CPT

## 2018-10-22 PROCEDURE — 82306 VITAMIN D 25 HYDROXY: CPT

## 2018-10-22 PROCEDURE — 83970 ASSAY OF PARATHORMONE: CPT

## 2018-10-22 PROCEDURE — 36415 COLL VENOUS BLD VENIPUNCTURE: CPT

## 2018-10-22 PROCEDURE — 82330 ASSAY OF CALCIUM: CPT

## 2018-10-22 PROCEDURE — 80048 BASIC METABOLIC PNL TOTAL CA: CPT

## 2018-10-26 ENCOUNTER — HOSPITAL ENCOUNTER (OUTPATIENT)
Age: 63
Discharge: HOME | End: 2018-10-26
Payer: MEDICARE

## 2018-10-26 DIAGNOSIS — N18.30 CKD (CHRONIC KIDNEY DISEASE) STAGE 3, GFR 30-59 ML/MIN (HCC): Chronic | ICD-10-CM

## 2018-10-26 DIAGNOSIS — E11.65 TYPE 2 DIABETES MELLITUS WITH HYPERGLYCEMIA, WITH LONG-TERM CURRENT USE OF INSULIN (HCC): Chronic | ICD-10-CM

## 2018-10-26 DIAGNOSIS — Z79.4 TYPE 2 DIABETES MELLITUS WITH HYPERGLYCEMIA, WITH LONG-TERM CURRENT USE OF INSULIN (HCC): Chronic | ICD-10-CM

## 2018-10-26 LAB
ANION GAP SERPL CALCULATED.3IONS-SCNC: 11 MMOL/L (ref 9–17)
BUN BLDV-MCNC: 13 MG/DL (ref 8–23)
BUN/CREAT BLD: 11 (ref 9–20)
CALCIUM SERPL-MCNC: 9.8 MG/DL (ref 8.6–10.4)
CHLORIDE BLD-SCNC: 102 MMOL/L (ref 98–107)
CO2: 25 MMOL/L (ref 20–31)
CREAT SERPL-MCNC: 1.2 MG/DL (ref 0.7–1.2)
GFR AFRICAN AMERICAN: >60 ML/MIN
GFR NON-AFRICAN AMERICAN: >60 ML/MIN
GFR SERPL CREATININE-BSD FRML MDRD: ABNORMAL ML/MIN/{1.73_M2}
GFR SERPL CREATININE-BSD FRML MDRD: ABNORMAL ML/MIN/{1.73_M2}
GLUCOSE BLD-MCNC: 176 MG/DL (ref 70–99)
POTASSIUM SERPL-SCNC: 4.8 MMOL/L (ref 3.7–5.3)
SODIUM BLD-SCNC: 138 MMOL/L (ref 135–144)

## 2018-10-26 PROCEDURE — 83036 HEMOGLOBIN GLYCOSYLATED A1C: CPT

## 2018-10-26 PROCEDURE — 36415 COLL VENOUS BLD VENIPUNCTURE: CPT

## 2018-10-26 PROCEDURE — 80048 BASIC METABOLIC PNL TOTAL CA: CPT

## 2018-10-27 LAB
ESTIMATED AVERAGE GLUCOSE: 194 MG/DL
HBA1C MFR BLD: 8.4 %

## 2018-11-14 ENCOUNTER — OFFICE VISIT (OUTPATIENT)
Dept: PRIMARY CARE CLINIC | Age: 63
End: 2018-11-14
Payer: MEDICARE

## 2018-11-14 VITALS
RESPIRATION RATE: 14 BRPM | BODY MASS INDEX: 30.4 KG/M2 | HEIGHT: 67 IN | WEIGHT: 193.7 LBS | HEART RATE: 92 BPM | TEMPERATURE: 97.6 F | SYSTOLIC BLOOD PRESSURE: 135 MMHG | DIASTOLIC BLOOD PRESSURE: 75 MMHG

## 2018-11-14 DIAGNOSIS — J20.9 ACUTE BRONCHITIS WITH COPD (HCC): ICD-10-CM

## 2018-11-14 DIAGNOSIS — J44.0 ACUTE BRONCHITIS WITH COPD (HCC): ICD-10-CM

## 2018-11-14 DIAGNOSIS — Z79.4 CONTROLLED TYPE 2 DIABETES MELLITUS WITH COMPLICATION, WITH LONG-TERM CURRENT USE OF INSULIN (HCC): Primary | ICD-10-CM

## 2018-11-14 DIAGNOSIS — E78.5 DYSLIPIDEMIA: Chronic | ICD-10-CM

## 2018-11-14 DIAGNOSIS — I10 ESSENTIAL HYPERTENSION: Chronic | ICD-10-CM

## 2018-11-14 DIAGNOSIS — Z23 NEED FOR INFLUENZA VACCINATION: ICD-10-CM

## 2018-11-14 DIAGNOSIS — E11.8 CONTROLLED TYPE 2 DIABETES MELLITUS WITH COMPLICATION, WITH LONG-TERM CURRENT USE OF INSULIN (HCC): Primary | ICD-10-CM

## 2018-11-14 LAB — HBA1C MFR BLD: 7.9 %

## 2018-11-14 PROCEDURE — 3023F SPIROM DOC REV: CPT | Performed by: NURSE PRACTITIONER

## 2018-11-14 PROCEDURE — 1036F TOBACCO NON-USER: CPT | Performed by: NURSE PRACTITIONER

## 2018-11-14 PROCEDURE — 3017F COLORECTAL CA SCREEN DOC REV: CPT | Performed by: NURSE PRACTITIONER

## 2018-11-14 PROCEDURE — G8482 FLU IMMUNIZE ORDER/ADMIN: HCPCS | Performed by: NURSE PRACTITIONER

## 2018-11-14 PROCEDURE — 99214 OFFICE O/P EST MOD 30 MIN: CPT | Performed by: NURSE PRACTITIONER

## 2018-11-14 PROCEDURE — G8926 SPIRO NO PERF OR DOC: HCPCS | Performed by: NURSE PRACTITIONER

## 2018-11-14 PROCEDURE — 83036 HEMOGLOBIN GLYCOSYLATED A1C: CPT | Performed by: NURSE PRACTITIONER

## 2018-11-14 PROCEDURE — 3045F PR MOST RECENT HEMOGLOBIN A1C LEVEL 7.0-9.0%: CPT | Performed by: NURSE PRACTITIONER

## 2018-11-14 PROCEDURE — 90471 IMMUNIZATION ADMIN: CPT | Performed by: NURSE PRACTITIONER

## 2018-11-14 PROCEDURE — G8427 DOCREV CUR MEDS BY ELIG CLIN: HCPCS | Performed by: NURSE PRACTITIONER

## 2018-11-14 PROCEDURE — 2022F DILAT RTA XM EVC RTNOPTHY: CPT | Performed by: NURSE PRACTITIONER

## 2018-11-14 PROCEDURE — 90686 IIV4 VACC NO PRSV 0.5 ML IM: CPT | Performed by: NURSE PRACTITIONER

## 2018-11-14 PROCEDURE — G8417 CALC BMI ABV UP PARAM F/U: HCPCS | Performed by: NURSE PRACTITIONER

## 2018-11-14 RX ORDER — AZITHROMYCIN 250 MG/1
250 TABLET, FILM COATED ORAL SEE ADMIN INSTRUCTIONS
Qty: 6 TABLET | Refills: 0 | Status: SHIPPED | OUTPATIENT
Start: 2018-11-14 | End: 2018-11-19

## 2018-11-14 RX ORDER — BENZONATATE 200 MG/1
200 CAPSULE ORAL 3 TIMES DAILY PRN
Qty: 30 CAPSULE | Refills: 0 | Status: SHIPPED | OUTPATIENT
Start: 2018-11-14 | End: 2018-11-14 | Stop reason: SDUPTHER

## 2018-11-14 RX ORDER — LANCETS 28 GAUGE
1 EACH MISCELLANEOUS 3 TIMES DAILY
Qty: 100 EACH | Refills: 11 | Status: SHIPPED | OUTPATIENT
Start: 2018-11-14 | End: 2021-10-14 | Stop reason: SDUPTHER

## 2018-11-14 RX ORDER — BLOOD-GLUCOSE METER
1 KIT MISCELLANEOUS DAILY
Qty: 1 KIT | Refills: 0 | Status: SHIPPED | OUTPATIENT
Start: 2018-11-14

## 2018-11-14 RX ORDER — AZITHROMYCIN 250 MG/1
250 TABLET, FILM COATED ORAL SEE ADMIN INSTRUCTIONS
Qty: 6 TABLET | Refills: 0 | Status: SHIPPED | OUTPATIENT
Start: 2018-11-14 | End: 2018-11-14 | Stop reason: SDUPTHER

## 2018-11-14 RX ORDER — BENZONATATE 200 MG/1
200 CAPSULE ORAL 3 TIMES DAILY PRN
Qty: 30 CAPSULE | Refills: 0 | Status: SHIPPED | OUTPATIENT
Start: 2018-11-14 | End: 2018-11-21

## 2018-11-14 ASSESSMENT — PATIENT HEALTH QUESTIONNAIRE - PHQ9
1. LITTLE INTEREST OR PLEASURE IN DOING THINGS: 0
SUM OF ALL RESPONSES TO PHQ QUESTIONS 1-9: 0
SUM OF ALL RESPONSES TO PHQ9 QUESTIONS 1 & 2: 0
2. FEELING DOWN, DEPRESSED OR HOPELESS: 0
SUM OF ALL RESPONSES TO PHQ QUESTIONS 1-9: 0

## 2018-11-14 ASSESSMENT — ENCOUNTER SYMPTOMS
NAUSEA: 0
RHINORRHEA: 0
SORE THROAT: 0
CONSTIPATION: 0
ABDOMINAL PAIN: 0
COUGH: 1
VOMITING: 0
SHORTNESS OF BREATH: 1
WHEEZING: 0
DIARRHEA: 0

## 2018-11-14 NOTE — PROGRESS NOTES
Name: Mil Mckinney  :          Chief Complaint:     Chief Complaint   Patient presents with    Diabetes     Routine office visit. States his blood sugar one week ago at THE [facility], [place], operated by [facility] was 124. \"     Hyperlipidemia    Hypertension       History of Present Illness:      Mil Mckinney is a 63 y.o.  male who presents with Diabetes (Routine office visit. States his blood sugar one week ago at THE [facility], [place], operated by [facility] was 124. \" ); Hyperlipidemia; and Hypertension      Peter Alba is here today for a routine office visit. Diabetes   He presents for his follow-up diabetic visit. He has type 2 diabetes mellitus. His disease course has been stable. Hypoglycemia symptoms include dizziness. Pertinent negatives for hypoglycemia include no headaches. Associated symptoms include foot paresthesias. Pertinent negatives for diabetes include no chest pain, no fatigue, no polydipsia, no polyphagia and no polyuria. There are no hypoglycemic complications. Symptoms are worsening. Diabetic complications include a CVA, nephropathy and peripheral neuropathy. Pertinent negatives for diabetic complications include no heart disease. Risk factors for coronary artery disease include diabetes mellitus, dyslipidemia, hypertension and male sex. Current diabetic treatment includes oral agent (dual therapy) and insulin injections. He is compliant with treatment all of the time. His weight is stable. He is following a generally healthy diet. Meal planning includes avoidance of concentrated sweets. He participates in exercise daily. There is no change in his home blood glucose trend. An ACE inhibitor/angiotensin II receptor blocker is being taken. He sees a podiatrist.Eye exam is current. Hyperlipidemia   This is a chronic problem. The current episode started more than 1 year ago. The problem is controlled. Recent lipid tests were reviewed and are normal. Exacerbating diseases include chronic renal disease and diabetes.  He has no history of Diabetic neuropathy ([facility] [address]     Gout     Hyperlipidemia     Hypertension     MRSA infection     Osteoarthritis     Stroke ([facility] [address] 2004    s/p cva - loss of half of vision in left eye    Type II [facility] mellitus ([facility] 1999      Reviewed all health maintenance requirements and ordered appropriate tests  Health Maintenance Due   Topic Date Due    Colon Cancer Screen FIT/FOBT  06/29/2018       Past Surgical History:     Past Surgical History:   Procedure Laterality Date    ABSCESS DRAINAGE  10/4/2015    perianal    APPENDECTOMY      CYST INCISION AND DRAINAGE  3/19/13    Abdomen        Medications:       Prior to Admission medications    Medication Sig Start Date End Date Taking? Authorizing Provider   Insulin Degludec (TRESIBA FLEXTOUCH) 200 UNIT/ML SOPN Inject 54 Units into the skin daily 11/14/18  Yes RAY Wadsworth CNP   azithromycin (ZITHROMAX) 250 MG tablet Take 1 tablet by mouth See Admin Instructions for 5 days 500mg on day 1 followed by 250mg on days 2 - 5 11/14/18 11/19/18 Yes RAY Wadsworth CNP   benzonatate (TESSALON) 200 MG capsule Take 1 capsule by mouth 3 times daily as needed for Cough 11/14/18 11/21/18 Yes RAY Wadsworth CNP   FREESTYLE LANCETS MISC 1 each by Does not apply route 3 times daily 11/14/18  Yes RAY Wadsworth CNP   glucose monitoring kit (FREESTYLE) monitoring kit 1 kit by Does not apply route daily 11/14/18  Yes RAY Wadsworth CNP   blood glucose test strips (FREESTYLE LITE) strip 1 each by In Vitro route 3 times daily As needed.  11/14/18  Yes RAY Wadsworth CNP   VICTOZA 18 MG/3ML SOPN SC injection INJECT 1.8 MG INTO THE SKIN DAILY, INCREASE TO THIS DOSE AS DIRECTED BY MD 10/2/18  Yes RAY Wadsworth CNP   atorvastatin (LIPITOR) 40 MG tablet TAKE ONE TABLET BY MOUTH DAILY 7/12/18  Yes RAY Wadsworth - CNP   aspirin 81 MG EC tablet TAKE ONE (1) TABLET BY MOUTH ONCE DAILY 7/10/18  Yes RAY Wadsworth CNP   Cholecalciferol syncope, light-headedness and headaches. Physical Exam:   Vitals:  /75 (Site: Left Upper Arm, Position: Sitting, Cuff Size: Large Adult)   Pulse 92   Temp 97.6 °F (36.4 °C) (Temporal)   Resp 14   Ht 5' 7\" (1.702 m)   Wt 193 lb 11.2 oz (87.9 kg)   BMI 30.34 kg/m²     Physical Exam   Constitutional: He is oriented to person, place, and time. He appears well-developed and well-nourished. No distress. HENT:   Right Ear: Hearing, tympanic membrane and ear canal normal. No middle ear effusion. Left Ear: Hearing, tympanic membrane and ear canal normal.   Mouth/Throat: Oropharynx is clear and moist.   Eyes: Conjunctivae are normal. No scleral icterus. Neck: Normal range of motion. Neck supple. Cardiovascular: Normal rate, regular rhythm and normal heart sounds. Pulses:       Dorsalis pedis pulses are 2+ on the right side, and 2+ on the left side. Pulmonary/Chest: Effort normal and breath sounds normal. He has no wheezes. Abdominal: Soft. Bowel sounds are normal. He exhibits no distension. There is no tenderness. Obese abdomen   Musculoskeletal: He exhibits no edema. Right foot: There is normal range of motion and no deformity. Left foot: There is normal range of motion and no deformity. Feet:   Right Foot:   Protective Sensation: 4 sites tested. 4 sites sensed. Skin Integrity: Positive for warmth and callus. Negative for ulcer, blister, skin breakdown, erythema or dry skin. Left Foot:   Protective Sensation: 4 sites tested. 4 sites sensed. Skin Integrity: Positive for warmth and callus. Negative for ulcer, blister, skin breakdown, erythema or dry skin. Neurological: He is alert and oriented to person, place, and time. Skin: Skin is warm and dry. No rash noted. Psychiatric: He has a normal mood and affect. His behavior is normal.   Nursing note and vitals reviewed.       Data:     Lab Results   Component Value Date     10/26/2018    K 4.8 10/26/2018    CL

## 2018-11-14 NOTE — PATIENT INSTRUCTIONS
season. But even when the vaccine doesn't exactly match these viruses, it may still provide some protection. Flu vaccine cannot prevent:  · Flu that is caused by a virus not covered by the vaccine. · Illnesses that look like flu but are not. Some people should not get this vaccine  Tell the person who is giving you the vaccine:  · If you have any severe (life-threatening) allergies. If you ever had a life-threatening allergic reaction after a dose of flu vaccine, or have a severe allergy to any part of this vaccine, you may be advised not to get vaccinated. Most, but not all, types of flu vaccine contain a small amount of egg protein. · If you ever had Guillain-Barré syndrome (also called GBS) Some people with a history of GBS should not get this vaccine. This should be discussed with your doctor. · If you are not feeling well. It is usually okay to get flu vaccine when you have a mild illness, but you might be asked to come back when you feel better. Risks of a vaccine reaction  With any medicine, including vaccines, there is a chance of reactions. These are usually mild and go away on their own, but serious reactions are also possible. Most people who get a flu shot do not have any problems with it. Minor problems following a flu shot include:  · Soreness, redness, or swelling where the shot was given  · Hoarseness  · Sore, red or itchy eyes  · Cough  · Fever  · Aches  · Headache  · Itching  · Fatigue  If these problems occur, they usually begin soon after the shot and last 1 or 2 days. More serious problems following a flu shot can include the following:  · There may be a small increased risk of Guillain-Barré Syndrome (GBS) after inactivated flu vaccine. This risk has been estimated at 1 or 2 additional cases per million people vaccinated. This is much lower than the risk of severe complications from flu, which can be prevented by flu vaccine.   · Norma Nash children who get the flu shot along with pneumococcal vaccine (PCV13) and/or DTaP vaccine at the same time might be slightly more likely to have a seizure caused by fever. Ask your doctor for more information. Tell your doctor if a child who is getting flu vaccine has ever had a seizure  Problems that could happen after any injected vaccine:  · People sometimes faint after a medical procedure, including vaccination. Sitting or lying down for about 15 minutes can help prevent fainting, and injuries caused by a fall. Tell your doctor if you feel dizzy, or have vision changes or ringing in the ears. · Some people get severe pain in the shoulder and have difficulty moving the arm where a shot was given. This happens very rarely. · Any medication can cause a severe allergic reaction. Such reactions from a vaccine are very rare, estimated at about 1 in a million doses, and would happen within a few minutes to a few hours after the vaccination. As with any medicine, there is a very remote chance of a vaccine causing a serious injury or death. The safety of vaccines is always being monitored. For more information, visit: www.cdc.gov/vaccinesafety/. What if there is a serious reaction? What should I look for? · Look for anything that concerns you, such as signs of a severe allergic reaction, very high fever, or unusual behavior. Signs of a severe allergic reaction can include hives, swelling of the face and throat, difficulty breathing, a fast heartbeat, dizziness, and weakness - usually within a few minutes to a few hours after the vaccination. What should I do? · If you think it is a severe allergic reaction or other emergency that can't wait, call 9-1-1 and get the person to the nearest hospital. Otherwise, call your doctor. · Reactions should be reported to the \"Vaccine Adverse Event Reporting System\" (VAERS). Your doctor should file this report, or you can do it yourself through the VAERS website at www.vaers. [facility].gov, or by calling

## 2018-11-26 ENCOUNTER — TELEPHONE (OUTPATIENT)
Dept: PRIMARY CARE CLINIC | Age: 63
End: 2018-11-26

## 2018-11-28 ENCOUNTER — CARE COORDINATION (OUTPATIENT)
Dept: CARE COORDINATION | Age: 63
End: 2018-11-28

## 2018-12-18 ENCOUNTER — CARE COORDINATION (OUTPATIENT)
Dept: CARE COORDINATION | Age: 63
End: 2018-12-18

## 2019-01-10 ENCOUNTER — CARE COORDINATION (OUTPATIENT)
Dept: CARE COORDINATION | Age: 64
End: 2019-01-10

## 2019-02-04 ENCOUNTER — CARE COORDINATION (OUTPATIENT)
Dept: CARE COORDINATION | Age: 64
End: 2019-02-04

## 2019-02-11 ENCOUNTER — HOSPITAL ENCOUNTER (OUTPATIENT)
Age: 64
Discharge: HOME | End: 2019-02-11
Payer: MEDICARE

## 2019-02-11 DIAGNOSIS — E11.8 CONTROLLED TYPE 2 DIABETES MELLITUS WITH COMPLICATION, WITH LONG-TERM CURRENT USE OF INSULIN (HCC): ICD-10-CM

## 2019-02-11 DIAGNOSIS — Z79.4 CONTROLLED TYPE 2 DIABETES MELLITUS WITH COMPLICATION, WITH LONG-TERM CURRENT USE OF INSULIN (HCC): ICD-10-CM

## 2019-02-11 LAB
ALT SERPL-CCNC: 11 U/L (ref 5–41)
ANION GAP SERPL CALCULATED.3IONS-SCNC: 9 MMOL/L (ref 9–17)
AST SERPL-CCNC: 15 U/L
BUN BLDV-MCNC: 21 MG/DL (ref 8–23)
BUN/CREAT BLD: 16 (ref 9–20)
CALCIUM SERPL-MCNC: 9.4 MG/DL (ref 8.6–10.4)
CHLORIDE BLD-SCNC: 105 MMOL/L (ref 98–107)
CHOLESTEROL/HDL RATIO: 5.6
CHOLESTEROL: 90 MG/DL
CO2: 27 MMOL/L (ref 20–31)
CREAT SERPL-MCNC: 1.28 MG/DL (ref 0.7–1.2)
CREATININE URINE: 271.8 MG/DL (ref 39–259)
ESTIMATED AVERAGE GLUCOSE: 160 MG/DL
GFR AFRICAN AMERICAN: >60 ML/MIN
GFR NON-AFRICAN AMERICAN: 57 ML/MIN
GFR SERPL CREATININE-BSD FRML MDRD: ABNORMAL ML/MIN/{1.73_M2}
GFR SERPL CREATININE-BSD FRML MDRD: ABNORMAL ML/MIN/{1.73_M2}
GLUCOSE BLD-MCNC: 136 MG/DL (ref 70–99)
HBA1C MFR BLD: 7.2 % (ref 4.8–5.9)
HDLC SERPL-MCNC: 16 MG/DL
LDL CHOLESTEROL: 42 MG/DL (ref 0–130)
MICROALBUMIN/CREAT 24H UR: <12 MG/L
MICROALBUMIN/CREAT UR-RTO: ABNORMAL MCG/MG CREAT
POTASSIUM SERPL-SCNC: 4.8 MMOL/L (ref 3.7–5.3)
SODIUM BLD-SCNC: 141 MMOL/L (ref 135–144)
TRIGL SERPL-MCNC: 161 MG/DL
VLDLC SERPL CALC-MCNC: ABNORMAL MG/DL (ref 1–30)

## 2019-02-11 PROCEDURE — 80048 BASIC METABOLIC PNL TOTAL CA: CPT

## 2019-02-11 PROCEDURE — 83036 HEMOGLOBIN GLYCOSYLATED A1C: CPT

## 2019-02-11 PROCEDURE — 36415 COLL VENOUS BLD VENIPUNCTURE: CPT

## 2019-02-11 PROCEDURE — 82570 ASSAY OF URINE CREATININE: CPT

## 2019-02-11 PROCEDURE — 82043 UR ALBUMIN QUANTITATIVE: CPT

## 2019-02-11 PROCEDURE — 84460 ALANINE AMINO (ALT) (SGPT): CPT

## 2019-02-11 PROCEDURE — 80061 LIPID PANEL: CPT

## 2019-02-11 PROCEDURE — 84450 TRANSFERASE (AST) (SGOT): CPT

## 2019-02-12 ENCOUNTER — OFFICE VISIT (OUTPATIENT)
Dept: PRIMARY CARE CLINIC | Age: 64
End: 2019-02-12
Payer: MEDICARE

## 2019-02-12 VITALS
BODY MASS INDEX: 28.72 KG/M2 | RESPIRATION RATE: 18 BRPM | SYSTOLIC BLOOD PRESSURE: 124 MMHG | TEMPERATURE: 97.7 F | DIASTOLIC BLOOD PRESSURE: 77 MMHG | HEIGHT: 67 IN | HEART RATE: 87 BPM | WEIGHT: 183 LBS

## 2019-02-12 DIAGNOSIS — F41.9 ANXIETY AND DEPRESSION: Primary | ICD-10-CM

## 2019-02-12 DIAGNOSIS — F32.A ANXIETY AND DEPRESSION: Primary | ICD-10-CM

## 2019-02-12 DIAGNOSIS — G44.209 TENSION HEADACHE: ICD-10-CM

## 2019-02-12 PROCEDURE — 99214 OFFICE O/P EST MOD 30 MIN: CPT | Performed by: NURSE PRACTITIONER

## 2019-02-12 ASSESSMENT — ENCOUNTER SYMPTOMS
SINUS PRESSURE: 0
RHINORRHEA: 0
SHORTNESS OF BREATH: 0
BACK PAIN: 1
VOMITING: 0
NAUSEA: 0

## 2019-02-12 ASSESSMENT — PATIENT HEALTH QUESTIONNAIRE - PHQ9
2. FEELING DOWN, DEPRESSED OR HOPELESS: 1
SUM OF ALL RESPONSES TO PHQ QUESTIONS 1-9: 2
1. LITTLE INTEREST OR PLEASURE IN DOING THINGS: 1
SUM OF ALL RESPONSES TO PHQ QUESTIONS 1-9: 2
SUM OF ALL RESPONSES TO PHQ9 QUESTIONS 1 & 2: 2

## 2019-02-14 ENCOUNTER — OFFICE VISIT (OUTPATIENT)
Dept: PRIMARY CARE CLINIC | Age: 64
End: 2019-02-14
Payer: MEDICARE

## 2019-02-14 VITALS
TEMPERATURE: 97.5 F | WEIGHT: 182.2 LBS | RESPIRATION RATE: 16 BRPM | BODY MASS INDEX: 28.6 KG/M2 | DIASTOLIC BLOOD PRESSURE: 74 MMHG | SYSTOLIC BLOOD PRESSURE: 112 MMHG | HEART RATE: 107 BPM | HEIGHT: 67 IN

## 2019-02-14 DIAGNOSIS — E11.42 DIABETIC POLYNEUROPATHY ASSOCIATED WITH TYPE 2 DIABETES MELLITUS (HCC): ICD-10-CM

## 2019-02-14 DIAGNOSIS — E11.65 TYPE 2 DIABETES MELLITUS WITH HYPERGLYCEMIA, WITH LONG-TERM CURRENT USE OF INSULIN (HCC): Primary | ICD-10-CM

## 2019-02-14 DIAGNOSIS — I10 ESSENTIAL HYPERTENSION: Chronic | ICD-10-CM

## 2019-02-14 DIAGNOSIS — E78.5 DYSLIPIDEMIA: Chronic | ICD-10-CM

## 2019-02-14 DIAGNOSIS — G89.4 CHRONIC PAIN SYNDROME: ICD-10-CM

## 2019-02-14 DIAGNOSIS — F34.1 DYSTHYMIA: ICD-10-CM

## 2019-02-14 DIAGNOSIS — Z79.4 TYPE 2 DIABETES MELLITUS WITH HYPERGLYCEMIA, WITH LONG-TERM CURRENT USE OF INSULIN (HCC): Primary | ICD-10-CM

## 2019-02-14 PROCEDURE — 3045F PR MOST RECENT HEMOGLOBIN A1C LEVEL 7.0-9.0%: CPT | Performed by: NURSE PRACTITIONER

## 2019-02-14 PROCEDURE — G8427 DOCREV CUR MEDS BY ELIG CLIN: HCPCS | Performed by: NURSE PRACTITIONER

## 2019-02-14 PROCEDURE — G8417 CALC BMI ABV UP PARAM F/U: HCPCS | Performed by: NURSE PRACTITIONER

## 2019-02-14 PROCEDURE — G8482 FLU IMMUNIZE ORDER/ADMIN: HCPCS | Performed by: NURSE PRACTITIONER

## 2019-02-14 PROCEDURE — 1036F TOBACCO NON-USER: CPT | Performed by: NURSE PRACTITIONER

## 2019-02-14 PROCEDURE — 3017F COLORECTAL CA SCREEN DOC REV: CPT | Performed by: NURSE PRACTITIONER

## 2019-02-14 PROCEDURE — 2022F DILAT RTA XM EVC RTNOPTHY: CPT | Performed by: NURSE PRACTITIONER

## 2019-02-14 PROCEDURE — 99214 OFFICE O/P EST MOD 30 MIN: CPT | Performed by: NURSE PRACTITIONER

## 2019-02-14 RX ORDER — DULOXETIN HYDROCHLORIDE 30 MG/1
30 CAPSULE, DELAYED RELEASE ORAL DAILY
Qty: 14 CAPSULE | Refills: 0 | Status: SHIPPED | OUTPATIENT
Start: 2019-02-14 | End: 2019-06-27

## 2019-02-14 RX ORDER — DULOXETIN HYDROCHLORIDE 30 MG/1
30 CAPSULE, DELAYED RELEASE ORAL DAILY
Qty: 90 CAPSULE | Refills: 1 | Status: SHIPPED | OUTPATIENT
Start: 2019-02-14 | End: 2019-06-27 | Stop reason: SDUPTHER

## 2019-02-14 ASSESSMENT — ENCOUNTER SYMPTOMS
SHORTNESS OF BREATH: 0
RHINORRHEA: 0
SORE THROAT: 0
NAUSEA: 0
CONSTIPATION: 0
ABDOMINAL PAIN: 0
WHEEZING: 0
DIARRHEA: 0
VOMITING: 0
COUGH: 0

## 2019-03-18 ENCOUNTER — CARE COORDINATION (OUTPATIENT)
Dept: CARE COORDINATION | Age: 64
End: 2019-03-18

## 2019-03-18 ENCOUNTER — HOSPITAL ENCOUNTER (OUTPATIENT)
Age: 64
Discharge: HOME | End: 2019-03-18
Payer: MEDICARE

## 2019-03-18 DIAGNOSIS — N18.30 CKD (CHRONIC KIDNEY DISEASE) STAGE 3, GFR 30-59 ML/MIN (HCC): Chronic | ICD-10-CM

## 2019-03-18 LAB
ANION GAP SERPL CALCULATED.3IONS-SCNC: 13 MMOL/L (ref 9–17)
BUN BLDV-MCNC: 21 MG/DL (ref 8–23)
BUN/CREAT BLD: 18 (ref 9–20)
CALCIUM SERPL-MCNC: 10.3 MG/DL (ref 8.6–10.4)
CHLORIDE BLD-SCNC: 103 MMOL/L (ref 98–107)
CO2: 24 MMOL/L (ref 20–31)
CREAT SERPL-MCNC: 1.18 MG/DL (ref 0.7–1.2)
CREATININE URINE: 149.3 MG/DL (ref 39–259)
GFR AFRICAN AMERICAN: >60 ML/MIN
GFR NON-AFRICAN AMERICAN: >60 ML/MIN
GFR SERPL CREATININE-BSD FRML MDRD: ABNORMAL ML/MIN/{1.73_M2}
GFR SERPL CREATININE-BSD FRML MDRD: ABNORMAL ML/MIN/{1.73_M2}
GLUCOSE BLD-MCNC: 232 MG/DL (ref 70–99)
POTASSIUM SERPL-SCNC: 4.8 MMOL/L (ref 3.7–5.3)
SODIUM BLD-SCNC: 140 MMOL/L (ref 135–144)
TOTAL PROTEIN, URINE: 17 MG/DL

## 2019-03-18 PROCEDURE — 80048 BASIC METABOLIC PNL TOTAL CA: CPT

## 2019-03-18 PROCEDURE — 84156 ASSAY OF PROTEIN URINE: CPT

## 2019-03-18 PROCEDURE — 36415 COLL VENOUS BLD VENIPUNCTURE: CPT

## 2019-03-18 PROCEDURE — 82570 ASSAY OF URINE CREATININE: CPT

## 2019-03-25 ENCOUNTER — TELEPHONE (OUTPATIENT)
Dept: PRIMARY CARE CLINIC | Age: 64
End: 2019-03-25

## 2019-03-25 DIAGNOSIS — Z12.11 ENCOUNTER FOR SCREENING FECAL OCCULT BLOOD TESTING: Primary | ICD-10-CM

## 2019-03-25 DIAGNOSIS — Z12.11 ENCOUNTER FOR SCREENING FECAL OCCULT BLOOD TESTING: ICD-10-CM

## 2019-03-25 LAB
CONTROL: PRESENT
HEMOCCULT STL QL: NEGATIVE

## 2019-03-25 PROCEDURE — 82274 ASSAY TEST FOR BLOOD FECAL: CPT | Performed by: NURSE PRACTITIONER

## 2019-04-08 RX ORDER — SITAGLIPTIN 50 MG/1
TABLET, FILM COATED ORAL
Qty: 90 TABLET | Refills: 10 | Status: SHIPPED | OUTPATIENT
Start: 2019-04-08 | End: 2019-04-08 | Stop reason: SDUPTHER

## 2019-04-10 ENCOUNTER — CARE COORDINATION (OUTPATIENT)
Dept: CARE COORDINATION | Age: 64
End: 2019-04-10

## 2019-06-24 ENCOUNTER — HOSPITAL ENCOUNTER (OUTPATIENT)
Age: 64
Discharge: HOME | End: 2019-06-24
Payer: MEDICARE

## 2019-06-24 DIAGNOSIS — N18.30 CKD (CHRONIC KIDNEY DISEASE) STAGE 3, GFR 30-59 ML/MIN (HCC): ICD-10-CM

## 2019-06-24 LAB
ANION GAP SERPL CALCULATED.3IONS-SCNC: 9 MMOL/L (ref 9–17)
BUN BLDV-MCNC: 22 MG/DL (ref 8–23)
BUN/CREAT BLD: 18 (ref 9–20)
CALCIUM SERPL-MCNC: 9.7 MG/DL (ref 8.6–10.4)
CHLORIDE BLD-SCNC: 105 MMOL/L (ref 98–107)
CO2: 28 MMOL/L (ref 20–31)
CREAT SERPL-MCNC: 1.22 MG/DL (ref 0.7–1.2)
GFR AFRICAN AMERICAN: >60 ML/MIN
GFR NON-AFRICAN AMERICAN: >60 ML/MIN
GFR SERPL CREATININE-BSD FRML MDRD: ABNORMAL ML/MIN/{1.73_M2}
GFR SERPL CREATININE-BSD FRML MDRD: ABNORMAL ML/MIN/{1.73_M2}
GLUCOSE BLD-MCNC: 80 MG/DL (ref 70–99)
POTASSIUM SERPL-SCNC: 5 MMOL/L (ref 3.7–5.3)
SODIUM BLD-SCNC: 142 MMOL/L (ref 135–144)

## 2019-06-24 PROCEDURE — 80048 BASIC METABOLIC PNL TOTAL CA: CPT

## 2019-06-24 PROCEDURE — 36415 COLL VENOUS BLD VENIPUNCTURE: CPT

## 2019-06-27 DIAGNOSIS — F34.1 DYSTHYMIA: ICD-10-CM

## 2019-06-27 DIAGNOSIS — G89.4 CHRONIC PAIN SYNDROME: ICD-10-CM

## 2019-06-27 RX ORDER — DULOXETIN HYDROCHLORIDE 30 MG/1
CAPSULE, DELAYED RELEASE ORAL
Qty: 90 CAPSULE | Refills: 3 | Status: SHIPPED | OUTPATIENT
Start: 2019-06-27 | End: 2019-07-09 | Stop reason: SDUPTHER

## 2019-06-27 NOTE — TELEPHONE ENCOUNTER
hypertension     CKD (chronic kidney disease) stage 3, GFR 30-59 ml/min (HCC)     BLANE (acute kidney injury) ([facility] [address]     Diabetic polyneuropathy associated with type 2 diabetes mellitus (HCC)     Fatty liver     Normocytic normochromic anemia

## 2019-07-09 DIAGNOSIS — G89.4 CHRONIC PAIN SYNDROME: ICD-10-CM

## 2019-07-09 DIAGNOSIS — E78.5 DYSLIPIDEMIA: ICD-10-CM

## 2019-07-09 DIAGNOSIS — F34.1 DYSTHYMIA: ICD-10-CM

## 2019-07-09 RX ORDER — ASPIRIN 81 MG/1
TABLET, COATED ORAL
Qty: 90 TABLET | Refills: 3 | Status: SHIPPED | OUTPATIENT
Start: 2019-07-09 | End: 2020-06-01

## 2019-07-09 RX ORDER — MELATONIN
Qty: 90 TABLET | Refills: 3 | Status: SHIPPED | OUTPATIENT
Start: 2019-07-09 | End: 2020-06-01 | Stop reason: SDUPTHER

## 2019-07-09 RX ORDER — DULOXETIN HYDROCHLORIDE 30 MG/1
CAPSULE, DELAYED RELEASE ORAL
Qty: 90 CAPSULE | Refills: 3 | Status: SHIPPED | OUTPATIENT
Start: 2019-07-09 | End: 2020-06-01

## 2019-07-09 RX ORDER — ATORVASTATIN CALCIUM 40 MG/1
TABLET, FILM COATED ORAL
Qty: 90 TABLET | Refills: 3 | Status: SHIPPED | OUTPATIENT
Start: 2019-07-09 | End: 2020-06-01

## 2019-07-09 NOTE — TELEPHONE ENCOUNTER
Health Maintenance   Topic Date Due    Shingles Vaccine (1 of 2) 11/15/2007    Diabetic retinal exam  12/13/2017    Flu vaccine (1) 09/01/2019    Diabetic foot exam  11/14/2019    A1C test (Diabetic or Prediabetic)  02/11/2020    Lipid screen  02/11/2020    Colon Cancer Screen FIT/FOBT  03/25/2020    Potassium monitoring  06/24/2020    Creatinine monitoring  06/24/2020    DTaP/Tdap/Td vaccine (2 - Td) 10/02/2025    Pneumococcal 0-64 years Vaccine  Completed    Hepatitis C screen  Completed    HIV screen  Completed             (applicable per patient's age: Cancer Screenings, Depression Screening, Fall Risk Screening, Immunizations)    Hemoglobin A1C (%)   Date Value   02/11/2019 7.2 (H)   11/14/2018 7.9   10/26/2018 8.4 (H)     Microalb/Crt.  Ratio (mcg/mg creat)   Date Value   02/11/2019 CANNOT BE CALCULATED     LDL Cholesterol (mg/dL)   Date Value   02/11/2019 42     AST (U/L)   Date Value   02/11/2019 15     ALT (U/L)   Date Value   02/11/2019 11     BUN (mg/dL)   Date Value   06/24/2019 22      (goal A1C is < 7)   (goal LDL is <100) need 30-50% reduction from baseline     BP Readings from Last 3 Encounters:   06/24/19 125/83   02/14/19 112/74   02/12/19 124/77    (goal /80)      All Future Testing planned in [facility]:  Lab Frequency Next Occurrence   Comprehensive Metabolic Panel Once 78/17/4894   CBC Auto Differential Once 08/13/2019   Hemoglobin A1C Once 08/13/2019   Microalbumin, Ur Once 90/95/4425   Basic Metabolic Panel Once 94/02/5949   Creatinine, Random Urine Once 01/06/2020   Protein, urine, random Once 01/06/2020       Next Visit Date:  Future Appointments   Date Time Provider Penelope Pham   8/15/2019  3:40 PM RAY Calderon - CNP [facility]   1/27/2020  1:30 PM Jacob Zimmerman MD [facility] None            Patient Active Problem List:     Type 2 diabetes mellitus with hyperglycemia, with long-term current use of insulin ([address]     Dyslipidemia     Gout     Essential hypertension     CKD (chronic kidney disease) stage 3, GFR 30-59 ml/min (HCC)     BLANE (acute kidney injury) ([facility] [address]     Diabetic polyneuropathy associated with type 2 diabetes mellitus (HCC)     Fatty liver     Normocytic normochromic anemia

## 2019-08-07 ENCOUNTER — TELEPHONE (OUTPATIENT)
Dept: PRIMARY CARE CLINIC | Age: 64
End: 2019-08-07

## 2019-08-13 ENCOUNTER — HOSPITAL ENCOUNTER (OUTPATIENT)
Age: 64
Discharge: HOME | End: 2019-08-13
Payer: MEDICARE

## 2019-08-13 DIAGNOSIS — E11.65 TYPE 2 DIABETES MELLITUS WITH HYPERGLYCEMIA, WITH LONG-TERM CURRENT USE OF INSULIN (HCC): ICD-10-CM

## 2019-08-13 DIAGNOSIS — Z79.4 TYPE 2 DIABETES MELLITUS WITH HYPERGLYCEMIA, WITH LONG-TERM CURRENT USE OF INSULIN (HCC): ICD-10-CM

## 2019-08-13 LAB
ABSOLUTE EOS #: 0.34 K/UL (ref 0–0.44)
ABSOLUTE IMMATURE GRANULOCYTE: <0.03 K/UL (ref 0–0.3)
ABSOLUTE LYMPH #: 2.16 K/UL (ref 1.1–3.7)
ABSOLUTE MONO #: 0.62 K/UL (ref 0.1–1.2)
ALBUMIN SERPL-MCNC: 3.9 G/DL (ref 3.5–5.2)
ALBUMIN/GLOBULIN RATIO: 1.3 (ref 1–2.5)
ALP BLD-CCNC: 89 U/L (ref 40–129)
ALT SERPL-CCNC: 21 U/L (ref 5–41)
ANION GAP SERPL CALCULATED.3IONS-SCNC: 11 MMOL/L (ref 9–17)
AST SERPL-CCNC: 18 U/L
BASOPHILS # BLD: 0 % (ref 0–2)
BASOPHILS ABSOLUTE: 0.03 K/UL (ref 0–0.2)
BILIRUB SERPL-MCNC: 0.29 MG/DL (ref 0.3–1.2)
BUN BLDV-MCNC: 17 MG/DL (ref 8–23)
BUN/CREAT BLD: 14 (ref 9–20)
CALCIUM SERPL-MCNC: 9.5 MG/DL (ref 8.6–10.4)
CHLORIDE BLD-SCNC: 104 MMOL/L (ref 98–107)
CO2: 26 MMOL/L (ref 20–31)
CREAT SERPL-MCNC: 1.19 MG/DL (ref 0.7–1.2)
CREATININE URINE: 134.4 MG/DL (ref 39–259)
DIFFERENTIAL TYPE: NORMAL
EOSINOPHILS RELATIVE PERCENT: 4 % (ref 1–4)
ESTIMATED AVERAGE GLUCOSE: 171 MG/DL
GFR AFRICAN AMERICAN: >60 ML/MIN
GFR NON-AFRICAN AMERICAN: >60 ML/MIN
GFR SERPL CREATININE-BSD FRML MDRD: ABNORMAL ML/MIN/{1.73_M2}
GFR SERPL CREATININE-BSD FRML MDRD: ABNORMAL ML/MIN/{1.73_M2}
GLUCOSE BLD-MCNC: 197 MG/DL (ref 70–99)
HBA1C MFR BLD: 7.6 % (ref 4.8–5.9)
HCT VFR BLD CALC: 46.7 % (ref 40.7–50.3)
HEMOGLOBIN: 14.8 G/DL (ref 13–17)
IMMATURE GRANULOCYTES: 0 %
LYMPHOCYTES # BLD: 26 % (ref 24–43)
MCH RBC QN AUTO: 29.5 PG (ref 25.2–33.5)
MCHC RBC AUTO-ENTMCNC: 31.7 G/DL (ref 28.4–34.8)
MCV RBC AUTO: 93.2 FL (ref 82.6–102.9)
MICROALBUMIN/CREAT 24H UR: <12 MG/L
MICROALBUMIN/CREAT UR-RTO: NORMAL MCG/MG CREAT
MONOCYTES # BLD: 7 % (ref 3–12)
NRBC AUTOMATED: 0 PER 100 WBC
PDW BLD-RTO: 12.8 % (ref 11.8–14.4)
PLATELET # BLD: 206 K/UL (ref 138–453)
PLATELET ESTIMATE: NORMAL
PMV BLD AUTO: 10.5 FL (ref 8.1–13.5)
POTASSIUM SERPL-SCNC: 4.8 MMOL/L (ref 3.7–5.3)
RBC # BLD: 5.01 M/UL (ref 4.21–5.77)
RBC # BLD: NORMAL 10*6/UL
SEG NEUTROPHILS: 63 % (ref 36–65)
SEGMENTED NEUTROPHILS ABSOLUTE COUNT: 5.23 K/UL (ref 1.5–8.1)
SODIUM BLD-SCNC: 141 MMOL/L (ref 135–144)
TOTAL PROTEIN: 6.9 G/DL (ref 6.4–8.3)
WBC # BLD: 8.4 K/UL (ref 3.5–11.3)
WBC # BLD: NORMAL 10*3/UL

## 2019-08-13 PROCEDURE — 83036 HEMOGLOBIN GLYCOSYLATED A1C: CPT

## 2019-08-13 PROCEDURE — 80053 COMPREHEN METABOLIC PANEL: CPT

## 2019-08-13 PROCEDURE — 82043 UR ALBUMIN QUANTITATIVE: CPT

## 2019-08-13 PROCEDURE — 36415 COLL VENOUS BLD VENIPUNCTURE: CPT

## 2019-08-13 PROCEDURE — 85025 COMPLETE CBC W/AUTO DIFF WBC: CPT

## 2019-08-13 PROCEDURE — 82570 ASSAY OF URINE CREATININE: CPT

## 2019-08-15 ENCOUNTER — OFFICE VISIT (OUTPATIENT)
Dept: PRIMARY CARE CLINIC | Age: 64
End: 2019-08-15
Payer: MEDICARE

## 2019-08-15 VITALS
HEIGHT: 67 IN | BODY MASS INDEX: 29.41 KG/M2 | WEIGHT: 187.4 LBS | HEART RATE: 88 BPM | SYSTOLIC BLOOD PRESSURE: 124 MMHG | TEMPERATURE: 98.6 F | RESPIRATION RATE: 16 BRPM | DIASTOLIC BLOOD PRESSURE: 88 MMHG

## 2019-08-15 DIAGNOSIS — Z79.4 CONTROLLED TYPE 2 DIABETES MELLITUS WITH COMPLICATION, WITH LONG-TERM CURRENT USE OF INSULIN (HCC): Primary | ICD-10-CM

## 2019-08-15 DIAGNOSIS — I10 ESSENTIAL HYPERTENSION: ICD-10-CM

## 2019-08-15 DIAGNOSIS — E78.5 DYSLIPIDEMIA: ICD-10-CM

## 2019-08-15 DIAGNOSIS — E11.8 CONTROLLED TYPE 2 DIABETES MELLITUS WITH COMPLICATION, WITH LONG-TERM CURRENT USE OF INSULIN (HCC): Primary | ICD-10-CM

## 2019-08-15 PROCEDURE — 3017F COLORECTAL CA SCREEN DOC REV: CPT | Performed by: NURSE PRACTITIONER

## 2019-08-15 PROCEDURE — G8427 DOCREV CUR MEDS BY ELIG CLIN: HCPCS | Performed by: NURSE PRACTITIONER

## 2019-08-15 PROCEDURE — G8417 CALC BMI ABV UP PARAM F/U: HCPCS | Performed by: NURSE PRACTITIONER

## 2019-08-15 PROCEDURE — 3045F PR MOST RECENT HEMOGLOBIN A1C LEVEL 7.0-9.0%: CPT | Performed by: NURSE PRACTITIONER

## 2019-08-15 PROCEDURE — 2022F DILAT RTA XM EVC RTNOPTHY: CPT | Performed by: NURSE PRACTITIONER

## 2019-08-15 PROCEDURE — 99214 OFFICE O/P EST MOD 30 MIN: CPT | Performed by: NURSE PRACTITIONER

## 2019-08-15 PROCEDURE — 1036F TOBACCO NON-USER: CPT | Performed by: NURSE PRACTITIONER

## 2019-08-15 ASSESSMENT — ENCOUNTER SYMPTOMS
WHEEZING: 0
ABDOMINAL PAIN: 0
VOMITING: 0
COUGH: 0
RHINORRHEA: 0
NAUSEA: 0
CONSTIPATION: 0
SORE THROAT: 0
DIARRHEA: 0
SHORTNESS OF BREATH: 0

## 2019-08-15 NOTE — PATIENT INSTRUCTIONS
SURVEY:    You may be receiving a survey from [employer] regarding your visit today. Please complete the survey to enable us to provide the highest quality of care to you and your family. If you cannot score us a very good on any question, please call the office to discuss how we could have made your experience a very good one. Thank you. Patient Education        Counting Carbohydrates: Care Instructions  Your Care Instructions    You don't have to eat special foods when you have diabetes. You just have to be careful to eat healthy foods. Carbohydrates (carbs) raise blood sugar higher and quicker than any other nutrient. Carbs are found in desserts, breads and cereals, and fruit. They're also in starchy vegetables. These include potatoes, corn, and grains such as rice and pasta. Carbs are also in milk and yogurt. The more carbs you eat at one time, the higher your blood sugar will rise. Spreading carbs all through the day helps keep your blood sugar levels within your target range. Counting carbs is one of the best ways to keep your blood sugar under control. If you use insulin, counting carbs helps you match the right amount of insulin to the number of grams of carbs in a meal. Then you can change your diet and insulin dose as needed. Testing your blood sugar several times a day can help you learn how carbs affect your blood sugar. A registered dietitian or certified diabetes educator can help you plan meals and snacks. Follow-up care is a key part of your treatment and safety. Be sure to make and go to all appointments, and call your doctor if you are having problems. It's also a good idea to know your test results and keep a list of the medicines you take. How can you care for yourself at home?   Know your daily amount of carbohydrates  Your daily amount depends on several things, such as your weight, how active you are, which diabetes medicines you take, and what your goals are for your blood

## 2019-08-15 NOTE — PROGRESS NOTES
problem is controlled. Recent lipid tests were reviewed and are normal. Exacerbating diseases include chronic renal disease and diabetes. He has no history of hypothyroidism, liver disease or obesity. Pertinent negatives include no chest pain or shortness of breath. Current antihyperlipidemic treatment includes statins. The current treatment provides moderate improvement of lipids. Compliance problems include adherence to exercise and adherence to diet. Risk factors for coronary artery disease include diabetes mellitus, dyslipidemia, hypertension and male sex. Hypertension   This is a chronic problem. The current episode started more than 1 year ago. The problem is unchanged. The problem is controlled. Pertinent negatives include no chest pain, headaches, neck pain, palpitations, peripheral edema or shortness of breath. There are no associated agents to hypertension. Risk factors for coronary artery disease include diabetes mellitus, dyslipidemia and male gender. Past treatments include ACE inhibitors. The current treatment provides moderate improvement. Compliance problems include exercise and diet. Hypertensive end-organ damage includes CVA. There is no history of kidney disease, CAD/MI or heart failure. Identifiable causes of hypertension include chronic renal disease. Past Medical History:     Past Medical History:   Diagnosis Date    Acute kidney injury ([address]     Chronic kidney disease     Diabetic neuropathy ([address]     Gout     Hyperlipidemia     Hypertension     MRSA infection     Osteoarthritis     Stroke ([address] 2004    s/p cva - loss of half of vision in left eye    Type II diabetes mellitus ([address] 1999      Reviewed all health maintenance requirements and ordered appropriate tests  There are no preventive care reminders to display for this patient.     Past Surgical History:     Past Surgical History:   Procedure Laterality Date    ABSCESS DRAINAGE  10/4/2015    perianal    APPENDECTOMY

## 2019-08-20 RX ORDER — LIRAGLUTIDE 6 MG/ML
INJECTION SUBCUTANEOUS
Qty: 9 ML | Refills: 10 | Status: SHIPPED | OUTPATIENT
Start: 2019-08-20 | End: 2020-06-29

## 2019-08-20 NOTE — TELEPHONE ENCOUNTER
Health Maintenance   Topic Date Due    Diabetic retinal exam  08/15/2020 (Originally 12/13/2017)    Shingles Vaccine (1 of 2) 08/15/2020 (Originally 11/15/2007)    Flu vaccine (1) 09/01/2019    Diabetic foot exam  11/14/2019    Lipid screen  02/11/2020    Colon Cancer Screen FIT/FOBT  03/25/2020    A1C test (Diabetic or Prediabetic)  08/13/2020    Potassium monitoring  08/13/2020    Creatinine monitoring  08/13/2020    DTaP/Tdap/Td vaccine (2 - Td) 10/02/2025    Pneumococcal 0-64 years Vaccine  Completed    Hepatitis C screen  Completed    HIV screen  Completed             (applicable per patient's age: Cancer Screenings, Depression Screening, Fall Risk Screening, Immunizations)    Hemoglobin A1C (%)   Date Value   08/13/2019 7.6 (H)   02/11/2019 7.2 (H)   11/14/2018 7.9     Microalb/Crt.  Ratio (mcg/mg creat)   Date Value   08/13/2019 CANNOT BE CALCULATED     LDL Cholesterol (mg/dL)   Date Value   02/11/2019 42     AST (U/L)   Date Value   08/13/2019 18     ALT (U/L)   Date Value   08/13/2019 21     BUN (mg/dL)   Date Value   08/13/2019 17      (goal A1C is < 7)   (goal LDL is <100) need 30-50% reduction from baseline     BP Readings from Last 3 Encounters:   08/15/19 124/88   06/24/19 125/83   02/14/19 112/74    (goal /80)      All Future Testing planned in [facility]:  Lab Frequency Next Occurrence   Basic Metabolic Panel Once 90/02/5731   Creatinine, Random Urine Once 01/06/2020   Protein, urine, random Once 01/06/2020       Next Visit Date:  Future Appointments   Date Time Provider Penelope Pham   11/19/2019  3:20 PM RAY Haq - CNP [facility]   1/27/2020  1:30 PM Deborah Avelar MD [facility] None            Patient Active Problem List:     Type 2 diabetes mellitus with hyperglycemia, with long-term current use of insulin (HCC)     Dyslipidemia     Gout     Essential hypertension     CKD (chronic kidney disease) stage 3, GFR 30-59 ml/min (HCC)     BLANE (acute kidney injury) ([facility] [address]     Diabetic polyneuropathy associated with type 2 diabetes mellitus ([facility] [address]     Fatty liver     Normocytic normochromic anemia

## 2019-11-19 ENCOUNTER — OFFICE VISIT (OUTPATIENT)
Dept: PRIMARY CARE CLINIC | Age: 64
End: 2019-11-19
Payer: MEDICARE

## 2019-11-19 VITALS
HEIGHT: 67 IN | TEMPERATURE: 98.1 F | WEIGHT: 183.5 LBS | HEART RATE: 91 BPM | BODY MASS INDEX: 28.8 KG/M2 | RESPIRATION RATE: 14 BRPM | SYSTOLIC BLOOD PRESSURE: 128 MMHG | DIASTOLIC BLOOD PRESSURE: 75 MMHG

## 2019-11-19 DIAGNOSIS — E11.8 CONTROLLED TYPE 2 DIABETES MELLITUS WITH COMPLICATION, WITH LONG-TERM CURRENT USE OF INSULIN (HCC): Primary | ICD-10-CM

## 2019-11-19 DIAGNOSIS — Z23 NEED FOR INFLUENZA VACCINATION: ICD-10-CM

## 2019-11-19 DIAGNOSIS — J45.909 ACUTE BRONCHITIS WITH ASTHMA: ICD-10-CM

## 2019-11-19 DIAGNOSIS — I10 ESSENTIAL HYPERTENSION: ICD-10-CM

## 2019-11-19 DIAGNOSIS — Z79.4 CONTROLLED TYPE 2 DIABETES MELLITUS WITH COMPLICATION, WITH LONG-TERM CURRENT USE OF INSULIN (HCC): Primary | ICD-10-CM

## 2019-11-19 DIAGNOSIS — J20.9 ACUTE BRONCHITIS WITH ASTHMA: ICD-10-CM

## 2019-11-19 DIAGNOSIS — E78.5 DYSLIPIDEMIA: ICD-10-CM

## 2019-11-19 LAB — HBA1C MFR BLD: 7 %

## 2019-11-19 PROCEDURE — G8427 DOCREV CUR MEDS BY ELIG CLIN: HCPCS | Performed by: NURSE PRACTITIONER

## 2019-11-19 PROCEDURE — 2022F DILAT RTA XM EVC RTNOPTHY: CPT | Performed by: NURSE PRACTITIONER

## 2019-11-19 PROCEDURE — G8417 CALC BMI ABV UP PARAM F/U: HCPCS | Performed by: NURSE PRACTITIONER

## 2019-11-19 PROCEDURE — 1036F TOBACCO NON-USER: CPT | Performed by: NURSE PRACTITIONER

## 2019-11-19 PROCEDURE — 3017F COLORECTAL CA SCREEN DOC REV: CPT | Performed by: NURSE PRACTITIONER

## 2019-11-19 PROCEDURE — 83036 HEMOGLOBIN GLYCOSYLATED A1C: CPT | Performed by: NURSE PRACTITIONER

## 2019-11-19 PROCEDURE — 90471 IMMUNIZATION ADMIN: CPT | Performed by: NURSE PRACTITIONER

## 2019-11-19 PROCEDURE — 99214 OFFICE O/P EST MOD 30 MIN: CPT | Performed by: NURSE PRACTITIONER

## 2019-11-19 PROCEDURE — G8482 FLU IMMUNIZE ORDER/ADMIN: HCPCS | Performed by: NURSE PRACTITIONER

## 2019-11-19 PROCEDURE — 90686 IIV4 VACC NO PRSV 0.5 ML IM: CPT | Performed by: NURSE PRACTITIONER

## 2019-11-19 RX ORDER — AZITHROMYCIN 250 MG/1
250 TABLET, FILM COATED ORAL SEE ADMIN INSTRUCTIONS
Qty: 6 TABLET | Refills: 0 | Status: SHIPPED | OUTPATIENT
Start: 2019-11-19 | End: 2019-11-24

## 2019-11-19 RX ORDER — BENZONATATE 200 MG/1
200 CAPSULE ORAL 3 TIMES DAILY PRN
Qty: 20 CAPSULE | Refills: 0 | Status: SHIPPED | OUTPATIENT
Start: 2019-11-19 | End: 2019-11-19 | Stop reason: SDUPTHER

## 2019-11-19 RX ORDER — BENZONATATE 200 MG/1
200 CAPSULE ORAL 3 TIMES DAILY PRN
Qty: 20 CAPSULE | Refills: 0 | Status: SHIPPED | OUTPATIENT
Start: 2019-11-19 | End: 2019-11-26

## 2019-11-19 RX ORDER — AZITHROMYCIN 250 MG/1
250 TABLET, FILM COATED ORAL SEE ADMIN INSTRUCTIONS
Qty: 6 TABLET | Refills: 0 | Status: SHIPPED | OUTPATIENT
Start: 2019-11-19 | End: 2019-11-19 | Stop reason: SDUPTHER

## 2019-11-19 ASSESSMENT — ENCOUNTER SYMPTOMS
CONSTIPATION: 0
WHEEZING: 0
SINUS PAIN: 0
SHORTNESS OF BREATH: 0
VOMITING: 0
ABDOMINAL PAIN: 0
SORE THROAT: 0
RHINORRHEA: 1
SWOLLEN GLANDS: 0
DIARRHEA: 0
NAUSEA: 0
COUGH: 1

## 2020-03-03 NOTE — TELEPHONE ENCOUNTER
Health Maintenance   Topic Date Due    Hepatitis B vaccine (1 of 3 - Risk 3-dose series) 11/15/1976    Lipid screen  02/11/2020    Colon Cancer Screen FIT/FOBT  03/25/2020    Diabetic retinal exam  08/15/2020 (Originally 12/13/2017)    Shingles Vaccine (1 of 2) 08/15/2020 (Originally 11/15/2007)    Potassium monitoring  08/13/2020    Creatinine monitoring  08/13/2020    Diabetic foot exam  11/19/2020    A1C test (Diabetic or Prediabetic)  11/19/2020    DTaP/Tdap/Td vaccine (2 - Td) 10/02/2025    Flu vaccine  Completed    Pneumococcal 0-64 years Vaccine  Completed    Hepatitis C screen  Completed    HIV screen  Completed    Hepatitis A vaccine  Aged Out    Hib vaccine  Aged Out    Meningococcal (ACWY) vaccine  Aged Out             (applicable per patient's age: Cancer Screenings, Depression Screening, Fall Risk Screening, Immunizations)    Hemoglobin A1C (%)   Date Value   11/19/2019 7.0   08/13/2019 7.6 (H)   02/11/2019 7.2 (H)     Microalb/Crt.  Ratio (mcg/mg creat)   Date Value   08/13/2019 CANNOT BE CALCULATED     LDL Cholesterol (mg/dL)   Date Value   02/11/2019 42     AST (U/L)   Date Value   08/13/2019 18     ALT (U/L)   Date Value   08/13/2019 21     BUN (mg/dL)   Date Value   08/13/2019 17      (goal A1C is < 7)   (goal LDL is <100) need 30-50% reduction from baseline     BP Readings from Last 3 Encounters:   11/19/19 128/75   08/15/19 124/88   06/24/19 125/83    (goal /80)      All Future Testing planned in [facility]:  Lab Frequency Next Occurrence   CBC Auto Differential Once 05/17/2020   ALT Once 05/19/2020   AST Once 16/55/7892   Basic Metabolic Panel Once 56/11/8470   Lipid Panel Once 05/17/2020   Hemoglobin A1C Once 05/17/2020   Microalbumin, Ur Once 05/17/2020       Next Visit Date:  Future Appointments   Date Time Provider Penelope Pham   5/19/2020  3:00 PM Jessica Fisher APRN - CNP [facility]            Patient Active Problem List:     Type 2 diabetes mellitus with

## 2020-05-11 ENCOUNTER — TELEPHONE (OUTPATIENT)
Dept: PRIMARY CARE CLINIC | Age: 65
End: 2020-05-11

## 2020-05-14 ENCOUNTER — HOSPITAL ENCOUNTER (OUTPATIENT)
Age: 65
Discharge: HOME | End: 2020-05-14
Payer: MEDICARE

## 2020-05-14 LAB
ABSOLUTE EOS #: 0.31 K/UL (ref 0–0.44)
ABSOLUTE IMMATURE GRANULOCYTE: 0.06 K/UL (ref 0–0.3)
ABSOLUTE LYMPH #: 2.55 K/UL (ref 1.1–3.7)
ABSOLUTE MONO #: 0.7 K/UL (ref 0.1–1.2)
ALT SERPL-CCNC: 18 U/L (ref 5–41)
ANION GAP SERPL CALCULATED.3IONS-SCNC: 12 MMOL/L (ref 9–17)
AST SERPL-CCNC: 19 U/L
BASOPHILS # BLD: 1 % (ref 0–2)
BASOPHILS ABSOLUTE: 0.07 K/UL (ref 0–0.2)
BUN BLDV-MCNC: 18 MG/DL (ref 8–23)
BUN/CREAT BLD: 14 (ref 9–20)
CALCIUM SERPL-MCNC: 9 MG/DL (ref 8.6–10.4)
CHLORIDE BLD-SCNC: 101 MMOL/L (ref 98–107)
CHOLESTEROL/HDL RATIO: 3.7
CHOLESTEROL: 99 MG/DL
CO2: 26 MMOL/L (ref 20–31)
CREAT SERPL-MCNC: 1.28 MG/DL (ref 0.7–1.2)
CREATININE URINE: 54.7 MG/DL (ref 39–259)
DIFFERENTIAL TYPE: ABNORMAL
EOSINOPHILS RELATIVE PERCENT: 3 % (ref 1–4)
ESTIMATED AVERAGE GLUCOSE: 148 MG/DL
GFR AFRICAN AMERICAN: >60 ML/MIN
GFR NON-AFRICAN AMERICAN: 57 ML/MIN
GFR SERPL CREATININE-BSD FRML MDRD: ABNORMAL ML/MIN/{1.73_M2}
GFR SERPL CREATININE-BSD FRML MDRD: ABNORMAL ML/MIN/{1.73_M2}
GLUCOSE BLD-MCNC: 152 MG/DL (ref 70–99)
HBA1C MFR BLD: 6.8 % (ref 4.8–5.9)
HCT VFR BLD CALC: 47.3 % (ref 40.7–50.3)
HDLC SERPL-MCNC: 27 MG/DL
HEMOGLOBIN: 15 G/DL (ref 13–17)
IMMATURE GRANULOCYTES: 1 %
LDL CHOLESTEROL: 51 MG/DL (ref 0–130)
LYMPHOCYTES # BLD: 21 % (ref 24–43)
MCH RBC QN AUTO: 29.7 PG (ref 25.2–33.5)
MCHC RBC AUTO-ENTMCNC: 31.7 G/DL (ref 28.4–34.8)
MCV RBC AUTO: 93.7 FL (ref 82.6–102.9)
MICROALBUMIN/CREAT 24H UR: 16 MG/L
MICROALBUMIN/CREAT UR-RTO: 29 MCG/MG CREAT
MONOCYTES # BLD: 6 % (ref 3–12)
NRBC AUTOMATED: 0 PER 100 WBC
PDW BLD-RTO: 13.1 % (ref 11.8–14.4)
PLATELET # BLD: 217 K/UL (ref 138–453)
PLATELET ESTIMATE: ABNORMAL
PMV BLD AUTO: 10.5 FL (ref 8.1–13.5)
POTASSIUM SERPL-SCNC: 4.6 MMOL/L (ref 3.7–5.3)
RBC # BLD: 5.05 M/UL (ref 4.21–5.77)
RBC # BLD: ABNORMAL 10*6/UL
SEG NEUTROPHILS: 68 % (ref 36–65)
SEGMENTED NEUTROPHILS ABSOLUTE COUNT: 8.38 K/UL (ref 1.5–8.1)
SODIUM BLD-SCNC: 139 MMOL/L (ref 135–144)
TRIGL SERPL-MCNC: 105 MG/DL
VLDLC SERPL CALC-MCNC: ABNORMAL MG/DL (ref 1–30)
WBC # BLD: 12.1 K/UL (ref 3.5–11.3)
WBC # BLD: ABNORMAL 10*3/UL

## 2020-05-14 PROCEDURE — 83036 HEMOGLOBIN GLYCOSYLATED A1C: CPT

## 2020-05-14 PROCEDURE — 82570 ASSAY OF URINE CREATININE: CPT

## 2020-05-14 PROCEDURE — 36415 COLL VENOUS BLD VENIPUNCTURE: CPT

## 2020-05-14 PROCEDURE — 80048 BASIC METABOLIC PNL TOTAL CA: CPT

## 2020-05-14 PROCEDURE — 82043 UR ALBUMIN QUANTITATIVE: CPT

## 2020-05-14 PROCEDURE — 80061 LIPID PANEL: CPT

## 2020-05-14 PROCEDURE — 84450 TRANSFERASE (AST) (SGOT): CPT

## 2020-05-14 PROCEDURE — 85025 COMPLETE CBC W/AUTO DIFF WBC: CPT

## 2020-05-14 PROCEDURE — 84460 ALANINE AMINO (ALT) (SGPT): CPT

## 2020-05-19 ENCOUNTER — OFFICE VISIT (OUTPATIENT)
Dept: PRIMARY CARE CLINIC | Age: 65
End: 2020-05-19
Payer: MEDICARE

## 2020-05-19 VITALS
TEMPERATURE: 98.6 F | BODY MASS INDEX: 30.73 KG/M2 | RESPIRATION RATE: 16 BRPM | DIASTOLIC BLOOD PRESSURE: 80 MMHG | SYSTOLIC BLOOD PRESSURE: 139 MMHG | HEIGHT: 67 IN | WEIGHT: 195.8 LBS | HEART RATE: 91 BPM

## 2020-05-19 PROCEDURE — G8427 DOCREV CUR MEDS BY ELIG CLIN: HCPCS | Performed by: NURSE PRACTITIONER

## 2020-05-19 PROCEDURE — 3044F HG A1C LEVEL LT 7.0%: CPT | Performed by: NURSE PRACTITIONER

## 2020-05-19 PROCEDURE — G8417 CALC BMI ABV UP PARAM F/U: HCPCS | Performed by: NURSE PRACTITIONER

## 2020-05-19 PROCEDURE — 2022F DILAT RTA XM EVC RTNOPTHY: CPT | Performed by: NURSE PRACTITIONER

## 2020-05-19 PROCEDURE — 3017F COLORECTAL CA SCREEN DOC REV: CPT | Performed by: NURSE PRACTITIONER

## 2020-05-19 PROCEDURE — 1036F TOBACCO NON-USER: CPT | Performed by: NURSE PRACTITIONER

## 2020-05-19 PROCEDURE — 99214 OFFICE O/P EST MOD 30 MIN: CPT | Performed by: NURSE PRACTITIONER

## 2020-05-19 ASSESSMENT — PATIENT HEALTH QUESTIONNAIRE - PHQ9
1. LITTLE INTEREST OR PLEASURE IN DOING THINGS: 0
2. FEELING DOWN, DEPRESSED OR HOPELESS: 0
SUM OF ALL RESPONSES TO PHQ QUESTIONS 1-9: 0
SUM OF ALL RESPONSES TO PHQ QUESTIONS 1-9: 0
SUM OF ALL RESPONSES TO PHQ9 QUESTIONS 1 & 2: 0

## 2020-05-19 ASSESSMENT — ENCOUNTER SYMPTOMS
RHINORRHEA: 0
COUGH: 0
SHORTNESS OF BREATH: 0
ABDOMINAL PAIN: 0
SORE THROAT: 0
CONSTIPATION: 0
WHEEZING: 0
NAUSEA: 0
VOMITING: 0
DIARRHEA: 0

## 2020-05-19 NOTE — PROGRESS NOTES
lisinopril (PRINIVIL;ZESTRIL) 5 MG tablet TAKE 1 TABLET BY MOUTH ONCE DAILY 3/11/20  Yes Ching Pederson MD   SITagliptin (JANUVIA) 50 MG tablet TAKE (1) TABLET BY MOUTH DAILY 3/3/20  Yes RAY Cid CNP   VICTOZA 18 MG/3ML SOPN SC injection INJECT 1.8 MG SUBCUTANEOUSLY SKIN DAILY INCREASE TO THIS DOSE AS DIRECTED BY MD 8/20/19  Yes RAY Chamorro CNP   Insulin Degludec (TRESIBA FLEXTOUCH) 200 UNIT/ML SOPN Inject 66 Units into the skin daily  Patient taking differently: Inject 64 Units into the skin daily  8/15/19  Yes RAY Chamorro CNP   Cholecalciferol (VITAMIN D3) 1000 units TABS TAKE (1) TABLET BY MOUTH DAILY 7/9/19  Yes RAY Chamorro CNP   DULoxetine (CYMBALTA) 30 MG extended release capsule TAKE (1) CAPSULE BY MOUTH DAILY 7/9/19  Yes RAY Chamorro CNP   atorvastatin (LIPITOR) 40 MG tablet TAKE (1) TABLET BY MOUTH DAILY 7/9/19  Yes RAY Chamorro CNP   ASPIRIN LOW DOSE 81 MG EC tablet TAKE ONE (1) TABLET BY MOUTH ONCE DAILY 7/9/19  Yes RAY Chamorro CNP   Insulin Pen Needle 31G X 8 MM MISC 1 each by Does not apply route 5 times daily Diabetes type 2, uncontrolled E11.41, E11.65, Z79.4 4/9/18  Yes RAY Chamorro CNP   Alcohol Swabs (EASY TOUCH ALCOHOL PREP MEDIUM) 70 % PADS USE TO TEST TWICE DAILY 5/15/17  Yes Historical Provider, MD QUIJANOYLE LANCETS MISC 1 each by Does not apply route 3 times daily  Patient not taking: Reported on 5/19/2020 11/14/18   RAY Chamorro CNP   glucose monitoring kit (FREESTYLE) monitoring kit 1 kit by Does not apply route daily  Patient not taking: Reported on 5/19/2020 11/14/18   RAY Chamorro CNP   blood glucose test strips (FREESTYLE LITE) strip 1 each by In Vitro route 3 times daily As needed. Patient not taking: Reported on 5/19/2020 11/14/18   RAY Chamorro CNP        Allergies:       Patient has no known allergies.     Social History:     Tobacco:    reports that he quit smoking about 4

## 2020-06-01 LAB
CONTROL: PRESENT
HEMOCCULT STL QL: POSITIVE

## 2020-06-01 PROCEDURE — 82274 ASSAY TEST FOR BLOOD FECAL: CPT | Performed by: NURSE PRACTITIONER

## 2020-06-01 RX ORDER — MELATONIN
Qty: 90 TABLET | Refills: 3 | Status: SHIPPED | OUTPATIENT
Start: 2020-06-01 | End: 2021-05-17

## 2020-06-01 NOTE — TELEPHONE ENCOUNTER
Essential hypertension     CKD (chronic kidney disease) stage 3, GFR 30-59 ml/min (HCC)     BLANE (acute kidney injury) ([facility] [address]     Diabetic polyneuropathy associated with type 2 diabetes mellitus (HCC)     Fatty liver     Normocytic normochromic anemia

## 2020-06-02 ENCOUNTER — TELEPHONE (OUTPATIENT)
Dept: GASTROENTEROLOGY | Facility: CLINIC | Age: 65
End: 2020-06-02

## 2020-06-02 NOTE — TELEPHONE ENCOUNTER
----- Message from RAY Velasco CNP sent at 6/1/2020  5:12 PM EDT -----  Positive fit test noted. Recommend colonoscopy, ordered.   Thank you

## 2020-06-02 NOTE — TELEPHONE ENCOUNTER
Direct Endoscopy Referral       Fax to [phone] or call the [facility] at [address]  1955  [phone] There is no work phone number on file. [address]    Referring Provider: Neva Fisher, APRN - CNP   [address] [address] [phone]  Special Requests: PLEASE CALL AND SCHEDULE PATIENT    Check Requested Procedures:    [x]  Colonoscopy (Please check test type and diagnosis below)    []  CPT Code:  Screening Average Risk     []  CPT Code:  Screening High Risk                 [x]  CPT Code :  Diagnostic Colonoscopy 02377    []  Personal History of colon cancer (Date of surgery)               []  Personal History of colon polyps (Date of surgery)     []  Family history of colon cancer ([facility])     []  Family history of colon polyps (1st degree relative - [facility])     []  Abnormal barium enema or CT (Please attach report)    []  Change in bowel habits     []  Occult GI bleeding     []  Melena with negative EGD     []  Iron deficiency anemia    [x]  Other:  Positive FIT test    []  EGD (Please check test type and diagnosis below)     []  CPT Code: EGD 05809    []  Melena     []  Dyspepsia/GERD     []  Dysphagia     []  Epigastric pain unresponsive to treatment     []  Iron deficiency anemia with negative colonoscopy     []  Occult GI bleeding with negative colonoscopy     []  Abnormal UGI, x-ray, or CT (attach report)    [] Other:    HISTORY:  Past Medical History:   Diagnosis Date    Acute kidney injury ([address]     Chronic kidney disease     Diabetic neuropathy ([address]     Gout     Hyperlipidemia     Hypertension     MRSA infection     Osteoarthritis     Stroke ([address] 2004    s/p cva - loss of half of vision in left eye    Type II diabetes mellitus ([facility] 1999     Patient has no known allergies. Prior to Visit Medications    Medication Sig Taking?  Authorizing Provider   atorvastatin (LIPITOR) 40 MG tablet TAKE 1 TABLET BY MOUTH DAILY  RAY Hayes CNP   DULoxetine (CYMBALTA) 30 MG extended release capsule TAKE ONE (1) CAPSULE BY MOUTH ONCE DAILY  Jessica Fisher, RAY Campbell CNP   ASPIRIN LOW DOSE 81 MG EC tablet TAKE 1 TABLET BY MOUTH EVERY DAY  RAY Rm CNP   vitamin D3 (CHOLECALCIFEROL) 25 MCG (1000 UT) TABS tablet TAKE (1) TABLET BY MOUTH DAILY  Jessica Fisher, APRN - CNP   lisinopril (PRINIVIL;ZESTRIL) 5 MG tablet TAKE 1 TABLET BY MOUTH ONCE DAILY  Arsalan Ojeda MD   SITagliptin (JANUVIA) 50 MG tablet TAKE (1) TABLET BY MOUTH DAILY  RAY Cid CNP   VICTOZA 18 MG/3ML SOPN SC injection INJECT 1.8 MG SUBCUTANEOUSLY SKIN DAILY INCREASE TO THIS DOSE AS DIRECTED BY RAY Desai CNP   Insulin Degludec (TRESIBA FLEXTOUCH) 200 UNIT/ML SOPN Inject 66 Units into the skin daily  Patient taking differently: Inject 64 Units into the skin daily   RAY Rm CNP   FREESTYLE LANCETS MISC 1 each by Does not apply route 3 times daily  Patient not taking: Reported on 5/19/2020  RAY Hayes CNP   glucose monitoring kit (FREESTYLE) monitoring kit 1 kit by Does not apply route daily  Patient not taking: Reported on 5/19/2020  RAY Hayes CNP   blood glucose test strips (FREESTYLE LITE) strip 1 each by In Vitro route 3 times daily As needed.   Patient not taking: Reported on 5/19/2020  RAY Hayes CNP   Insulin Pen Needle 31G X 8 MM MISC 1 each by Does not apply route 5 times daily Diabetes type 2, uncontrolled E11.41, E11.65, Z79.4  RAY Hayes CNP   Alcohol Swabs (EASY TOUCH ALCOHOL PREP MEDIUM) 70 % PADS USE TO TEST TWICE DAILY  Historical Provider, MD         Electronically signed by RAY Santiago CNP on 6/2/20 at 9:36 AM EDT

## 2020-06-23 PROBLEM — R19.5 POSITIVE FIT (FECAL IMMUNOCHEMICAL TEST): Status: ACTIVE | Noted: 2020-06-23

## 2020-06-23 RX ORDER — SODIUM, POTASSIUM,MAG SULFATES 17.5-3.13G
SOLUTION, RECONSTITUTED, ORAL ORAL
Qty: 2 BOTTLE | Refills: 0 | Status: ON HOLD
Start: 2020-06-23 | End: 2020-09-14 | Stop reason: HOSPADM

## 2020-06-23 NOTE — TELEPHONE ENCOUNTER
Suprep not covered. When scheduled Mary Jo Quan will need a sample if available if not he will need to use Miralax prep.

## 2020-06-29 RX ORDER — LIRAGLUTIDE 6 MG/ML
INJECTION SUBCUTANEOUS
Qty: 9 ML | Refills: 10 | Status: SHIPPED | OUTPATIENT
Start: 2020-06-29 | End: 2021-05-17

## 2020-06-29 RX ORDER — INSULIN DEGLUDEC 200 U/ML
66 INJECTION, SOLUTION SUBCUTANEOUS DAILY
Qty: 9 ML | Refills: 10 | Status: SHIPPED | OUTPATIENT
Start: 2020-06-29 | End: 2021-05-17

## 2020-06-29 NOTE — TELEPHONE ENCOUNTER
Health Maintenance   Topic Date Due    Diabetic retinal exam  08/15/2020 (Originally 12/13/2017)    Shingles Vaccine (1 of 2) 08/15/2020 (Originally 11/15/2007)    Diabetic foot exam  11/19/2020    A1C test (Diabetic or Prediabetic)  05/14/2021    Lipid screen  05/14/2021    Potassium monitoring  05/14/2021    Creatinine monitoring  05/14/2021    Colon Cancer Screen FIT/FOBT  06/01/2021    DTaP/Tdap/Td vaccine (2 - Td) 10/02/2025    Flu vaccine  Completed    Pneumococcal 0-64 years Vaccine  Completed    Hepatitis C screen  Completed    HIV screen  Completed    Hepatitis A vaccine  Aged Out    Hib vaccine  Aged Out    Meningococcal (ACWY) vaccine  Aged Out             (applicable per patient's age: Cancer Screenings, Depression Screening, Fall Risk Screening, Immunizations)    Hemoglobin A1C (%)   Date Value   05/14/2020 6.8 (H)   11/19/2019 7.0   08/13/2019 7.6 (H)     Microalb/Crt.  Ratio (mcg/mg creat)   Date Value   05/14/2020 29 (H)     LDL Cholesterol (mg/dL)   Date Value   05/14/2020 51     AST (U/L)   Date Value   05/14/2020 19     ALT (U/L)   Date Value   05/14/2020 18     BUN (mg/dL)   Date Value   05/14/2020 18      (goal A1C is < 7)   (goal LDL is <100) need 30-50% reduction from baseline     BP Readings from Last 3 Encounters:   05/19/20 139/80   11/19/19 128/75   08/15/19 124/88    (goal /80)      All Future Testing planned in [facility]:  Lab Frequency Next Occurrence   CBC Auto Differential Once 11/15/2020   ALT Once 11/19/2020   AST Once 25/09/3320   Basic Metabolic Panel Once 82/41/7441   Hemoglobin A1C Once 11/15/2020   COLONOSCOPY W/ OR W/O BIOPSY Once 06/23/2020       Next Visit Date:  Future Appointments   Date Time Provider Penelope Pham   11/16/2020  3:20 PM Jennifer Fisher APRN - CNP [facility]            Patient Active Problem List:     Type 2 diabetes mellitus with hyperglycemia, with long-term current use of insulin ([address]     Dyslipidemia     Gout

## 2020-09-02 ENCOUNTER — ANESTHESIA EVENT (OUTPATIENT)
Dept: OPERATING ROOM | Age: 65
End: 2020-09-02
Payer: MEDICARE

## 2020-09-08 DIAGNOSIS — Z01.818 PREOP TESTING: Primary | ICD-10-CM

## 2020-09-10 ENCOUNTER — HOSPITAL ENCOUNTER (OUTPATIENT)
Dept: PREADMISSION TESTING | Age: 65
Setting detail: SPECIMEN
Discharge: HOME | End: 2020-09-14
Payer: MEDICARE

## 2020-09-10 PROCEDURE — U0003 INFECTIOUS AGENT DETECTION BY NUCLEIC ACID (DNA OR RNA); SEVERE ACUTE RESPIRATORY SYNDROME CORONAVIRUS 2 (SARS-COV-2) (CORONAVIRUS DISEASE [COVID-19]), AMPLIFIED PROBE TECHNIQUE, MAKING USE OF HIGH THROUGHPUT TECHNOLOGIES AS DESCRIBED BY CMS-2020-01-R: HCPCS

## 2020-09-10 PROCEDURE — C9803 HOPD COVID-19 SPEC COLLECT: HCPCS

## 2020-09-12 LAB — SARS-COV-2, NAA: NOT DETECTED

## 2020-09-14 ENCOUNTER — ANESTHESIA (OUTPATIENT)
Dept: OPERATING ROOM | Age: 65
End: 2020-09-14
Payer: MEDICARE

## 2020-09-14 ENCOUNTER — HOSPITAL ENCOUNTER (OUTPATIENT)
Age: 65
Setting detail: OUTPATIENT SURGERY
Discharge: HOME | End: 2020-09-14
Attending: INTERNAL MEDICINE | Admitting: INTERNAL MEDICINE
Payer: MEDICARE

## 2020-09-14 VITALS — DIASTOLIC BLOOD PRESSURE: 61 MMHG | OXYGEN SATURATION: 98 % | SYSTOLIC BLOOD PRESSURE: 93 MMHG

## 2020-09-14 VITALS
SYSTOLIC BLOOD PRESSURE: 101 MMHG | HEIGHT: 67 IN | HEART RATE: 79 BPM | RESPIRATION RATE: 13 BRPM | TEMPERATURE: 97.7 F | WEIGHT: 187 LBS | OXYGEN SATURATION: 97 % | BODY MASS INDEX: 29.35 KG/M2 | DIASTOLIC BLOOD PRESSURE: 41 MMHG

## 2020-09-14 LAB
GLUCOSE BLD-MCNC: 108 MG/DL (ref 74–100)
GLUCOSE BLD-MCNC: 108 MG/DL (ref 74–100)

## 2020-09-14 PROCEDURE — 88305 TISSUE EXAM BY PATHOLOGIST: CPT

## 2020-09-14 PROCEDURE — 45385 COLONOSCOPY W/LESION REMOVAL: CPT | Performed by: INTERNAL MEDICINE

## 2020-09-14 PROCEDURE — 3700000000 HC ANESTHESIA ATTENDED CARE: Performed by: INTERNAL MEDICINE

## 2020-09-14 PROCEDURE — 3609010600 HC COLONOSCOPY POLYPECTOMY SNARE/COLD BIOPSY: Performed by: INTERNAL MEDICINE

## 2020-09-14 PROCEDURE — 7100000011 HC PHASE II RECOVERY - ADDTL 15 MIN: Performed by: INTERNAL MEDICINE

## 2020-09-14 PROCEDURE — 2580000003 HC RX 258: Performed by: INTERNAL MEDICINE

## 2020-09-14 PROCEDURE — 6360000002 HC RX W HCPCS: Performed by: NURSE ANESTHETIST, CERTIFIED REGISTERED

## 2020-09-14 PROCEDURE — 7100000010 HC PHASE II RECOVERY - FIRST 15 MIN: Performed by: INTERNAL MEDICINE

## 2020-09-14 PROCEDURE — 82947 ASSAY GLUCOSE BLOOD QUANT: CPT

## 2020-09-14 PROCEDURE — 2709999900 HC NON-CHARGEABLE SUPPLY: Performed by: INTERNAL MEDICINE

## 2020-09-14 PROCEDURE — 3700000001 HC ADD 15 MINUTES (ANESTHESIA): Performed by: INTERNAL MEDICINE

## 2020-09-14 RX ORDER — PROPOFOL 10 MG/ML
INJECTION, EMULSION INTRAVENOUS PRN
Status: DISCONTINUED | OUTPATIENT
Start: 2020-09-14 | End: 2020-09-14 | Stop reason: SDUPTHER

## 2020-09-14 RX ORDER — LIDOCAINE HYDROCHLORIDE 20 MG/ML
INJECTION, SOLUTION INTRAVENOUS PRN
Status: DISCONTINUED | OUTPATIENT
Start: 2020-09-14 | End: 2020-09-14 | Stop reason: SDUPTHER

## 2020-09-14 RX ORDER — SODIUM CHLORIDE, SODIUM LACTATE, POTASSIUM CHLORIDE, CALCIUM CHLORIDE 600; 310; 30; 20 MG/100ML; MG/100ML; MG/100ML; MG/100ML
INJECTION, SOLUTION INTRAVENOUS CONTINUOUS
Status: DISCONTINUED | OUTPATIENT
Start: 2020-09-14 | End: 2020-09-14 | Stop reason: HOSPADM

## 2020-09-14 RX ORDER — PROPOFOL 10 MG/ML
INJECTION, EMULSION INTRAVENOUS CONTINUOUS PRN
Status: DISCONTINUED | OUTPATIENT
Start: 2020-09-14 | End: 2020-09-14 | Stop reason: SDUPTHER

## 2020-09-14 RX ADMIN — PROPOFOL 50 MG: 10 INJECTION, EMULSION INTRAVENOUS at 15:43

## 2020-09-14 RX ADMIN — PHENYLEPHRINE HYDROCHLORIDE 100 MCG: 10 INJECTION INTRAVENOUS at 15:56

## 2020-09-14 RX ADMIN — SODIUM CHLORIDE, POTASSIUM CHLORIDE, SODIUM LACTATE AND CALCIUM CHLORIDE: 600; 310; 30; 20 INJECTION, SOLUTION INTRAVENOUS at 14:47

## 2020-09-14 RX ADMIN — PROPOFOL 100 MCG/KG/MIN: 10 INJECTION, EMULSION INTRAVENOUS at 15:19

## 2020-09-14 RX ADMIN — LIDOCAINE HYDROCHLORIDE 50 MG: 20 INJECTION, SOLUTION INTRAVENOUS at 15:19

## 2020-09-14 RX ADMIN — PROPOFOL 100 MG: 10 INJECTION, EMULSION INTRAVENOUS at 15:28

## 2020-09-14 RX ADMIN — PHENYLEPHRINE HYDROCHLORIDE 100 MCG: 10 INJECTION INTRAVENOUS at 15:48

## 2020-09-14 RX ADMIN — PHENYLEPHRINE HYDROCHLORIDE 100 MCG: 10 INJECTION INTRAVENOUS at 16:01

## 2020-09-14 RX ADMIN — PROPOFOL 100 MG: 10 INJECTION, EMULSION INTRAVENOUS at 15:38

## 2020-09-14 ASSESSMENT — PAIN DESCRIPTION - LOCATION: LOCATION: BACK

## 2020-09-14 ASSESSMENT — PAIN DESCRIPTION - PAIN TYPE: TYPE: CHRONIC PAIN

## 2020-09-14 ASSESSMENT — PAIN SCALES - GENERAL
PAINLEVEL_OUTOF10: 4
PAINLEVEL_OUTOF10: 0

## 2020-09-14 ASSESSMENT — LIFESTYLE VARIABLES: SMOKING_STATUS: 1

## 2020-09-14 NOTE — PROGRESS NOTES
Patient states his last b.m. was liquid, but brown and not transparent. Warm tap water enema administered. Patient takes 1000ml and tolerates well. Expels clear liquid into toilet.

## 2020-09-14 NOTE — ANESTHESIA POSTPROCEDURE EVALUATION
Department of Anesthesiology  Postprocedure Note    Patient: Phoebe Alicea  MRN: [id number]  YOB: 1955  Date of evaluation: 9/14/2020  Time:  5:13 PM     Procedure Summary     Date:  09/14/20 Room / Location:  [address]    Anesthesia Start:  5542 Anesthesia Stop:  4018    Procedure:  COLONOSCOPY POLYPECTOMY SNARE/COLD BIOPSY   hot snare and cold snare (N/A ) Diagnosis:  (POSITIVE FIT)    Surgeon:  Jazmyne Garvin MD Responsible Provider:  RAY Flowers CRNA    Anesthesia Type:  general, TIVA ASA Status:  3          Anesthesia Type: general, TIVA    Charmaine Phase I: Charmaine Score: 10    Charmaine Phase II: Charmaine Score: 9    Last vitals: Reviewed and per EMR flowsheets.        Anesthesia Post Evaluation    Patient location during evaluation: PACU  Patient participation: complete - patient cannot participate  Level of consciousness: awake and alert  Pain score: 0  Airway patency: patent  Nausea & Vomiting: no vomiting and no nausea  Complications: no  Cardiovascular status: hemodynamically stable  Respiratory status: spontaneous ventilation and room air  Hydration status: stable

## 2020-09-14 NOTE — H&P
History and Physical    Patient's Name/Date of Birth: Korey Gray /  (7 y.o.)    Date: 2020     CHIEF COMPLAINT:  Positive FIT, change in bowel habit    Past Medical History:   Diagnosis Date    [facility] ([facility]     Chronic kidney disease     Diabetic neuropathy ([facility]     Gout     Hyperlipidemia     Hypertension     MRSA infection     Osteoarthritis     Stroke ([facility]     s/p cva - loss of half of vision in left eye    Type II diabetes mellitus ([facility]      Past Surgical History:   Procedure Laterality Date    ABSCESS DRAINAGE  10/4/2015    perianal    APPENDECTOMY      CYST INCISION AND DRAINAGE  3/19/13    Abdomen     Current Facility-Administered Medications   Medication Dose Route Frequency Provider Last Rate Last Dose    lactated ringers infusion   Intravenous Continuous Sandrine gArawal  mL/hr at 20 1447       No Known Allergies  Family History   Problem Relation Age of Onset    Diabetes Mother     Heart Disease Father     Diabetes Brother     Diabetes Brother     Stroke Other     Diabetes Other     Arthritis Other      Social History     Socioeconomic History    Marital status:      Spouse name: Not on file    Number of children: Not on file    Years of education: Not on file    Highest education level: Not on file   Occupational History    Not on file   Social Needs    Financial resource strain: Not on file    Food insecurity     Worry: Not on file     Inability: Not on file    Transportation needs     Medical: Not on file     Non-medical: Not on file   Tobacco Use    Smoking status: Former Smoker     Packs/day: 0.00     Years: 25.00     Pack years: 0.00     Types: Cigars     Last attempt to quit: 10/2/2015     Years since quittin.9    Smokeless tobacco: Never Used    Tobacco comment: trying to quit, smokes little cigars   Substance and Sexual Activity    Alcohol use: No    Drug use: No    Sexual activity: Yes     Partners: Female   Lifestyle    Physical activity     Days per week: Not on file     Minutes per session: Not on file    Stress: Not on file   Relationships    Social connections     Talks on phone: Not on file     Gets together: Not on file     Attends Buddhism service: Not on file     Active member of club or organization: Not on file     Attends meetings of clubs or organizations: Not on file     Relationship status: Not on file    Intimate partner violence     Fear of current or ex partner: Not on file     Emotionally abused: Not on file     Physically abused: Not on file     Forced sexual activity: Not on file   Other Topics Concern    Not on file   Social History Narrative    Not on file     ROS: Non-contributory    Physical Exam:  Vitals:    09/14/20 1415   BP: (!) 157/96   Pulse: 92   Resp: 15   SpO2: 99%       Chest: Breath sounds were clear and equal with no rales, wheezes, or rhonchi. Respiratory effort was normal with no retractions or use of accessory muscles. Cardiovascular: Heart sounds were normal with a regular rate and rhythm. There were no murmurs, gallops or rubs. Abdomen: Bowel sounds were normal.  The abdomen was soft and non distended. There was no tenderness, guarding, rebound, or rigidity. There were no masses, hepatosplenomegaly, or hernias.     Plan: Colonoscopy      Electronically by Asha Reddy MD  on 9/14/2020 at 3:13 PM

## 2020-09-14 NOTE — ANESTHESIA PRE PROCEDURE
Department of Anesthesiology  Preprocedure Note       Name:  Dennys Carr   Age:  60 y.o.  :  1955                                          MRN:  [id number]         Date:  2020      Surgeon: Rosio Curry):  Javier Becerra MD    Procedure: Procedure(s):  COLONOSCOPY DIAGNOSTIC    Medications prior to admission:   Prior to Admission medications    Medication Sig Start Date End Date Taking? Authorizing Provider   Insulin Degludec (TRESIBA FLEXTOUCH) 200 UNIT/ML SOPN Inject 66 Units into the skin daily 20  Yes RAY Hernandez CNP   VICTOZA 18 MG/3ML SOPN SC injection INJECT 1.8MG SUBCUTANEOUSLY DAILY INCREASE THIS DOSE AS DIRECTED BY MD 20  Yes RAY Hernandez CNP   Na Sulfate-K Sulfate-Mg Sulf (SUPREP BOWEL PREP KIT) 17.5-3.13-1.6 GM/177ML SOLN Take as directed 20  Yes Javier Becerra MD   atorvastatin (LIPITOR) 40 MG tablet TAKE 1 TABLET BY MOUTH DAILY 20  Yes RAY Hernandez CNP   DULoxetine (CYMBALTA) 30 MG extended release capsule TAKE ONE (1) CAPSULE BY MOUTH ONCE DAILY 20  Yes RAY Hernandez CNP   ASPIRIN LOW DOSE 81 MG EC tablet TAKE 1 TABLET BY MOUTH EVERY DAY 20  Yes RAY Hernandez CNP   vitamin D3 (CHOLECALCIFEROL) 25 MCG (1000 UT) TABS tablet TAKE (1) TABLET BY MOUTH DAILY 20  Yes RAY Hernandez CNP   lisinopril (PRINIVIL;ZESTRIL) 5 MG tablet TAKE 1 TABLET BY MOUTH ONCE DAILY 3/11/20  Yes Joe Gavin MD   SITagliptin (JANUVIA) 50 MG tablet TAKE (1) TABLET BY MOUTH DAILY 3/3/20  Yes RAY Cid CNP   FREESTYLE LANCETS MISC 1 each by Does not apply route 3 times daily 18  Yes RAY Hernandez CNP   glucose monitoring kit (FREESTYLE) monitoring kit 1 kit by Does not apply route daily 18  Yes RAY Hernandez CNP   blood glucose test strips (FREESTYLE LITE) strip 1 each by In Vitro route 3 times daily As needed.  11/14/18  Yes Klever Manual Might, APRN - CNP   Insulin Pen Needle 31G X 8 MM MISC 1 each by Does not apply route 5 times daily Diabetes type 2, uncontrolled E11.41, E11.65, Z79.4 18  Yes RAY Bundy CNP   Alcohol Swabs (EASY TOUCH ALCOHOL PREP MEDIUM) 70 % PADS USE TO TEST TWICE DAILY 5/15/17  Yes Historical Provider, MD       Current medications:    Current Facility-Administered Medications   Medication Dose Route Frequency Provider Last Rate Last Dose    lactated ringers infusion   Intravenous Continuous Rojas Daniel  mL/hr at 20 1157         Allergies:  No Known Allergies    Problem List:    Patient Active Problem List   Diagnosis Code    Type 2 diabetes mellitus with hyperglycemia, with long-term current use of insulin ([facility]) E11.65, Z79.4    Dyslipidemia E78.5    Gout M10.9    Essential hypertension I10    CKD (chronic kidney disease) stage 3, GFR 30-59 ml/min ([facility]) N18.3    BLANE (acute kidney injury) ([facility] N17.9    Diabetic polyneuropathy associated with type 2 diabetes mellitus ([facility] E11.42    Fatty liver K76.0    Normocytic normochromic anemia D64.9    Positive FIT (fecal immunochemical test) R19.5       Past Medical History:        Diagnosis Date    Acute kidney injury ([address]     Chronic kidney disease     Diabetic neuropathy ([address]     Gout     Hyperlipidemia     Hypertension     MRSA infection     Osteoarthritis     Stroke ([address] 2004    s/p cva - loss of half of vision in left eye    Type II diabetes mellitus ([address]        Past Surgical History:        Procedure Laterality Date    ABSCESS DRAINAGE  10/4/2015    perianal    APPENDECTOMY      CYST INCISION AND DRAINAGE  3/19/13    Abdomen       Social History:    Social History     Tobacco Use    Smoking status: Former Smoker     Packs/day: 0.00     Years: 25.00     Pack years: 0.00     Types: Cigars     Last attempt to quit: 10/2/2015     Years since quittin.9    Smokeless tobacco: Never Used    Tobacco comment: trying to quit, smokes little cigars   Substance Use Topics    Alcohol use: No                                Counseling given: Not Answered  Comment: trying to quit, smokes little cigars      Vital Signs (Current):   Vitals:    09/14/20 1415   BP: (!) 157/96   Pulse: 92   Resp: 15   TempSrc: Temporal   SpO2: 99%   Weight: 187 lb (84.8 kg)   Height: 5' 7\" (1.702 m)                                              BP Readings from Last 3 Encounters:   09/14/20 (!) 157/96   05/19/20 139/80   11/19/19 128/75       NPO Status: Time of last liquid consumption: 1100                        Time of last solid consumption: 1700                        Date of last liquid consumption: 09/14/20                        Date of last solid food consumption: 09/12/20    BMI:   Wt Readings from Last 3 Encounters:   09/14/20 187 lb (84.8 kg)   05/19/20 195 lb 12.8 oz (88.8 kg)   11/19/19 183 lb 8 oz (83.2 kg)     Body mass index is 29.29 kg/m².     CBC:   Lab Results   Component Value Date    WBC 12.1 05/14/2020    RBC 5.05 05/14/2020    RBC 5.26 02/22/2012    HGB 15.0 05/14/2020    HCT 47.3 05/14/2020    MCV 93.7 05/14/2020    RDW 13.1 05/14/2020     05/14/2020     02/22/2012       CMP:   Lab Results   Component Value Date     05/14/2020    K 4.6 05/14/2020     05/14/2020    CO2 26 05/14/2020    BUN 18 05/14/2020    CREATININE 1.28 05/14/2020    GFRAA >60 05/14/2020    LABGLOM 57 05/14/2020    GLUCOSE 152 05/14/2020    GLUCOSE 200 03/13/2012    PROT 6.9 08/13/2019    CALCIUM 9.0 05/14/2020    BILITOT 0.29 08/13/2019    ALKPHOS 89 08/13/2019    AST 19 05/14/2020    ALT 18 05/14/2020       POC Tests:   Recent Labs     09/14/20  1448   POCGLU 108*  108*       Coags:   Lab Results   Component Value Date    PROTIME 10.5 04/21/2018    INR 1.0 04/21/2018    APTT 20.0 07/26/2016       HCG (If Applicable): No results found for: PREGTESTUR, PREGSERUM, HCG, HCGQUANT     ABGs: No results found for: PHART, PO2ART, QVC5BPP, OSA2WGT, BEART, R3KMLVBF     Type & Screen (If Applicable):  No results found for: Mike Lindsey    Drug/Infectious Status (If Applicable):  Lab Results   Component Value Date    HEPCAB NONREACTIVE 07/26/2016       COVID-19 Screening (If Applicable):   Lab Results   Component Value Date    COVID19 Not Detected 09/10/2020         Anesthesia Evaluation   no history of anesthetic complications:   Airway: Mallampati: III  TM distance: >3 FB   Neck ROM: full  Mouth opening: > = 3 FB Dental:      Comment: Pt states he has very poor dentition, multiple missing, denies any loose    Pulmonary:normal exam  breath sounds clear to auscultation  (+) current smoker          Patient smoked on day of surgery. 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                 Abdominal:           Vascular: negative vascular ROS. Anesthesia Plan      general and TIVA     ASA 3       Induction: intravenous. Anesthetic plan and risks discussed with patient.                       RAY Durbin - CRNA   9/14/2020

## 2020-09-14 NOTE — OP NOTE
PROCEDURE NOTE    DATE OF PROCEDURE: 9/14/2020    ENDOSCOPIST: Cameron Ames. Violet Ureña MD, Uziel Carroll    ASSISTANT: None    PREOPERATIVE DIAGNOSIS: Positive FIT    POSTOPERATIVE DIAGNOSIS: diverticulosis,  Mild in degree, involving the sigmoid  polyp(s) #1, 3-4 mm in size, located in the transverse colon and the sigmoid removed by cold snare and retrieved for pathology  #2, 6-8 mm in size, located in the sigmoid removed by snare cautery and retrieved for pathology    OPERATION: Colonoscopy with polypectomy (cold snare), polypectomy (snare cautery)    ANESTHESIA: MAC     ESTIMATED BLOOD LOSS: Minimal    COMPLICATIONS: None. SPECIMENS: were obtained    HISTORY: The patient is a 60y.o. year old male with history of above preop diagnosis. Colonoscopy with possible biopsy or polypectomy has been recommended and I explained the risk, benefits, expected outcome, and alternatives to the procedure. Risks include but are not limited to bleeding, infection, respiratory distress, hypotension, and perforation of the colon. The patient understands and is in agreement. PROCEDURE: The patient was given monitored anesthesia care. The patient was given oxygen by nasal cannula. The colonoscope was inserted per rectum and advanced under direct vision to the cecum without difficulty. Findings:  Cecum/Ascending colon: normal    Transverse colon: abnormal: Polyp removed with cold snare recovered for histology    Descending/Sigmoid colon: abnormal: Polyps (3) removed with cold snare and snare cautery, recovered for histology. Diverticulosis    Rectum/Anus: examined in normal and retroflexed positions and was normal    The colon was decompressed and the scope was removed. The patient tolerated the procedure well.        Electronically signed by Jacqueline Herrera MD  on 9/14/2020 at 4:11 PM

## 2020-09-14 NOTE — PROGRESS NOTES
Patient verbalizes readiness for discharge. Discharge instructions given to patient and responsible adult, answered all questions, and verbalized understanding of discharge instructions. Discharge Criteria    Inpatients must meet Criteria 1 through 7. All other patients are either YES or N/A. If a NO is chosen then Anesthesia or Surgeon must be notified. 1.  Minimum 30 minutes after last dose of sedative medication, minimum 120 minutes after last dose of reversal agent. Yes      2. Systolic BP stable within 20 mmHg for 30 minutes & systolic BP between 90 & 875 or within 10 mmHg of baseline. Yes      3. Pulse between 60 and 100 or within 10 bpm of baseline. Yes      4. Spontaneous respiratory rate >/= 10 per minute. Yes      5. SaO2 >/= 95 or  >/= baseline. Yes      6. Able to cough and swallow or return to baseline function. Yes      7. Alert and oriented or return to baseline mental status. Yes      8. Demonstrates controlled, coordinated movements, ambulates with steady gait, or return to baseline activity function. Yes      9. Minimal or no pain or nausea, or at a level tolerable and acceptable to patient. Yes      10. Takes and retains oral fluids as allowed. Yes      11. Procedural / perioperative site stable. Minimal or no bleeding. Yes          12. If GI endoscopy procedure, minimal or no abdominal distention or passing flatus. Yes      13. Written discharge instructions and emergency telephone number provided. Yes      14. Accompanied by a responsible adult.     Yes

## 2020-09-16 LAB — SURGICAL PATHOLOGY REPORT: NORMAL

## 2020-11-16 ENCOUNTER — OFFICE VISIT (OUTPATIENT)
Dept: PRIMARY CARE CLINIC | Age: 65
End: 2020-11-16
Payer: MEDICARE

## 2020-11-16 VITALS
HEART RATE: 91 BPM | SYSTOLIC BLOOD PRESSURE: 136 MMHG | OXYGEN SATURATION: 96 % | DIASTOLIC BLOOD PRESSURE: 88 MMHG | BODY MASS INDEX: 30.13 KG/M2 | WEIGHT: 192 LBS | RESPIRATION RATE: 19 BRPM | HEIGHT: 67 IN | TEMPERATURE: 98.9 F

## 2020-11-16 LAB — HBA1C MFR BLD: 8.6 %

## 2020-11-16 PROCEDURE — G8427 DOCREV CUR MEDS BY ELIG CLIN: HCPCS | Performed by: NURSE PRACTITIONER

## 2020-11-16 PROCEDURE — 3017F COLORECTAL CA SCREEN DOC REV: CPT | Performed by: NURSE PRACTITIONER

## 2020-11-16 PROCEDURE — 90688 IIV4 VACCINE SPLT 0.5 ML IM: CPT | Performed by: NURSE PRACTITIONER

## 2020-11-16 PROCEDURE — 3052F HG A1C>EQUAL 8.0%<EQUAL 9.0%: CPT | Performed by: NURSE PRACTITIONER

## 2020-11-16 PROCEDURE — 2022F DILAT RTA XM EVC RTNOPTHY: CPT | Performed by: NURSE PRACTITIONER

## 2020-11-16 PROCEDURE — G8417 CALC BMI ABV UP PARAM F/U: HCPCS | Performed by: NURSE PRACTITIONER

## 2020-11-16 PROCEDURE — 1036F TOBACCO NON-USER: CPT | Performed by: NURSE PRACTITIONER

## 2020-11-16 PROCEDURE — 83036 HEMOGLOBIN GLYCOSYLATED A1C: CPT | Performed by: NURSE PRACTITIONER

## 2020-11-16 PROCEDURE — 90471 IMMUNIZATION ADMIN: CPT | Performed by: NURSE PRACTITIONER

## 2020-11-16 PROCEDURE — 99214 OFFICE O/P EST MOD 30 MIN: CPT | Performed by: NURSE PRACTITIONER

## 2020-11-16 PROCEDURE — G8482 FLU IMMUNIZE ORDER/ADMIN: HCPCS | Performed by: NURSE PRACTITIONER

## 2020-11-16 RX ORDER — MONTELUKAST SODIUM 10 MG/1
10 TABLET ORAL DAILY
Qty: 90 TABLET | Refills: 3 | Status: SHIPPED | OUTPATIENT
Start: 2020-11-16 | End: 2021-11-18

## 2020-11-16 ASSESSMENT — ENCOUNTER SYMPTOMS
SORE THROAT: 0
WHEEZING: 0
CONSTIPATION: 0
NAUSEA: 0
SWOLLEN GLANDS: 0
ABDOMINAL PAIN: 0
SINUS PAIN: 0
RHINORRHEA: 0
COUGH: 1
VOMITING: 0
DIARRHEA: 0
SHORTNESS OF BREATH: 0

## 2020-11-16 NOTE — PROGRESS NOTES
Name: Cornelia Hernandez  :          Chief Complaint:     Chief Complaint   Patient presents with    Diabetes     6 month f/u, not checking BS, no complaints    Hypertension     6 month f/u, no complaints    Cough     X's months, lingering, non-productive       History of Present Illness:      Cornelia Hernandez is a 63 y.o.  male who presents with Diabetes (6 month f/u, not checking BS, no complaints); Hypertension (6 month f/u, no complaints); and Cough (X's months, lingering, non-productive)      [facility] is here today for a routine office visit. Diabetes   He presents for his follow-up diabetic visit. He has type 2 diabetes mellitus. His disease course has been improving. There are no hypoglycemic associated symptoms. Pertinent negatives for hypoglycemia include no dizziness or headaches. Associated symptoms include foot paresthesias. Pertinent negatives for diabetes include no chest pain, no fatigue, no polydipsia, no polyphagia and no polyuria. There are no hypoglycemic complications. Symptoms are worsening. Diabetic complications include a CVA, nephropathy and peripheral neuropathy. Pertinent negatives for diabetic complications include no heart disease. Risk factors for coronary artery disease include diabetes mellitus, dyslipidemia, hypertension and male sex. Current diabetic treatment includes oral agent (dual therapy) and insulin injections. He is compliant with treatment all of the time. His weight is stable. He is following a generally healthy diet. When asked about meal planning, he reported none. He participates in exercise daily. There is no change in his home blood glucose trend. His breakfast blood glucose range is generally 130-140 mg/dl. An ACE inhibitor/angiotensin II receptor blocker is being taken. He does not see a podiatrist.Eye exam is not current. Hypertension   This is a chronic problem. The current episode started more than 1 year ago. The problem is unchanged.  The problem is controlled. Pertinent negatives include no chest pain, headaches, neck pain, palpitations, peripheral edema or shortness of breath. There are no associated agents to hypertension. Risk factors for coronary artery disease include diabetes mellitus, dyslipidemia and male gender. Past treatments include ACE inhibitors. The current treatment provides moderate improvement. Compliance problems include exercise and diet. Hypertensive end-organ damage includes CVA. There is no history of kidney disease, CAD/MI or heart failure. Identifiable causes of hypertension include chronic renal disease. URI    This is a recurrent problem. The current episode started more than 1 month ago. The problem has been waxing and waning. There has been no fever. Associated symptoms include congestion, coughing and sneezing. Pertinent negatives include no abdominal pain, chest pain, diarrhea, dysuria, ear pain, headaches, joint pain, joint swelling, nausea, neck pain, plugged ear sensation, rash, rhinorrhea, sinus pain, sore throat, swollen glands, vomiting or wheezing. He has tried nothing for the symptoms. The treatment provided no relief. Past Medical History:     Past Medical History:   Diagnosis Date    Acute kidney injury ([address]     Chronic kidney disease     Diabetic neuropathy ([address]     Gout     Hyperlipidemia     Hypertension     MRSA infection     Osteoarthritis     Stroke ([address] 2004    s/p cva - loss of half of vision in left eye    Type II diabetes mellitus ([address] 1999      Reviewed all health maintenance requirements and ordered appropriate tests  There are no preventive care reminders to display for this patient.     Past Surgical History:     Past Surgical History:   Procedure Laterality Date    ABSCESS DRAINAGE  10/4/2015    perianal    APPENDECTOMY      COLONOSCOPY  09/14/2020    Dr. Tre Plascencia serrated adenoma)diverticulosis    COLONOSCOPY N/A 9/14/2020    COLONOSCOPY POLYPECTOMY SNARE/COLD BIOPSY   hot snare and cold snare performed by Kaye Flores MD at [address]  3/19/13    Abdomen        Medications:       Prior to Admission medications    Medication Sig Start Date End Date Taking?  Authorizing Provider   montelukast (SINGULAIR) 10 MG tablet Take 1 tablet by mouth daily 11/16/20  Yes RAY Burrows CNP   Insulin Degludec (TRESIBA FLEXTOUCH) 200 UNIT/ML SOPN Inject 66 Units into the skin daily 6/29/20  Yes RAY Burrows CNP   VICTOZA 18 MG/3ML SOPN SC injection INJECT 1.8MG SUBCUTANEOUSLY DAILY INCREASE THIS DOSE AS DIRECTED BY MD 6/29/20  Yes RAY Burrows CNP   atorvastatin (LIPITOR) 40 MG tablet TAKE 1 TABLET BY MOUTH DAILY 6/1/20  Yes RAY Burrows - CNP   DULoxetine (CYMBALTA) 30 MG extended release capsule TAKE ONE (1) CAPSULE BY MOUTH ONCE DAILY 6/1/20  Yes RAY Burrows - CNP   ASPIRIN LOW DOSE 81 MG EC tablet TAKE 1 TABLET BY MOUTH EVERY DAY 6/1/20  Yes Sarai Fisher APRN - CNP   vitamin D3 (CHOLECALCIFEROL) 25 MCG (1000 UT) TABS tablet TAKE (1) TABLET BY MOUTH DAILY 6/1/20  Yes RAY Burrows - CNP   lisinopril (PRINIVIL;ZESTRIL) 5 MG tablet TAKE 1 TABLET BY MOUTH ONCE DAILY 3/11/20  Yes Rashid Mckeon MD   SITagliptin (JANUVIA) 50 MG tablet TAKE (1) TABLET BY MOUTH DAILY 3/3/20  Yes RAY Cid CNP   Insulin Pen Needle 31G X 8 MM MISC 1 each by Does not apply route 5 times daily Diabetes type 2, uncontrolled E11.41, E11.65, Z79.4 4/9/18  Yes RAY Burrows CNP   FREESTYLE LANCETS MISC 1 each by Does not apply route 3 times daily  Patient not taking: Reported on 11/16/2020 11/14/18   RAY Burrows CNP   glucose monitoring kit (FREESTYLE) monitoring kit 1 kit by Does not apply route daily  Patient not taking: Reported on 11/16/2020 11/14/18   Sarai Fisher, APRN - CNP   blood glucose test strips (FREESTYLE LITE) strip 1 each by In Vitro route 3 times daily As needed. Patient not taking: Reported on 11/16/2020 11/14/18   Maria Russell Phillip, APRN - CNP   Alcohol Swabs (EASY TOUCH ALCOHOL PREP MEDIUM) 70 % PADS USE TO TEST TWICE DAILY 5/15/17   Historical Provider, MD        Allergies:       Patient has no known allergies. Social History:     Tobacco:    reports that he quit smoking about 5 years ago. His smoking use included cigars. He smoked 0.00 packs per day for 25.00 years. He has never used smokeless tobacco.  Alcohol:      reports no history of alcohol use. Drug Use:  reports no history of drug use. Family History:     Family History   Problem Relation Age of Onset    Diabetes Mother     Heart Disease Father     Diabetes Brother     Diabetes Brother     Stroke Other     Diabetes Other     Arthritis Other        Review of Systems:     Positive and Negative as described in HPI    Review of Systems   Constitutional: Negative for chills, fatigue and fever. HENT: Positive for congestion, postnasal drip and sneezing. Negative for ear pain, rhinorrhea, sinus pain and sore throat. Eyes: Negative for visual disturbance. Respiratory: Positive for cough. Negative for shortness of breath and wheezing. Cardiovascular: Negative for chest pain and palpitations. Gastrointestinal: Negative for abdominal pain, constipation, diarrhea, nausea and vomiting. Endocrine: Negative for polydipsia, polyphagia and polyuria. Genitourinary: Negative for difficulty urinating and dysuria. Musculoskeletal: Negative for joint pain, neck pain and neck stiffness. Skin: Negative for rash. Neurological: Negative for dizziness, syncope, light-headedness and headaches. Physical Exam:   Vitals:  /88 (Site: Left Upper Arm, Position: Sitting, Cuff Size: Large Adult)   Pulse 91   Temp 98.9 °F (37.2 °C) (Temporal)   Resp 19   Ht 5' 7\" (1.702 m)   Wt 192 lb (87.1 kg)   SpO2 96%   BMI 30.07 kg/m²     Physical Exam  Vitals signs and nursing note reviewed. Constitutional:       General: He is not in acute distress. Appearance: He is well-developed. HENT:      Nose: Mucosal edema and congestion present. Right Turbinates: Swollen and pale. Left Turbinates: Swollen and pale. Mouth/Throat:      Mouth: Mucous membranes are moist. Mucous membranes are not dry. Pharynx: No posterior oropharyngeal erythema. Eyes:      General: No scleral icterus. Conjunctiva/sclera: Conjunctivae normal.   Neck:      Musculoskeletal: Normal range of motion and neck supple. Cardiovascular:      Rate and Rhythm: Normal rate and regular rhythm. Heart sounds: No murmur. Pulmonary:      Effort: Pulmonary effort is normal.      Breath sounds: Normal breath sounds. No wheezing. Abdominal:      General: Bowel sounds are normal. There is no distension. Palpations: Abdomen is soft. Tenderness: There is no abdominal tenderness. Musculoskeletal:      Right lower leg: No edema. Left lower leg: No edema. Lymphadenopathy:      Cervical: No cervical adenopathy. Skin:     General: Skin is warm and dry. Findings: No rash. Neurological:      Mental Status: He is alert and oriented to person, place, and time. Psychiatric:         Mood and Affect: Mood is not anxious or depressed.          Behavior: Behavior normal.         Data:     Lab Results   Component Value Date     05/14/2020    K 4.6 05/14/2020     05/14/2020    CO2 26 05/14/2020    BUN 18 05/14/2020    CREATININE 1.28 05/14/2020    GLUCOSE 152 05/14/2020    GLUCOSE 200 03/13/2012    PROT 6.9 08/13/2019    LABALBU 3.9 08/13/2019    LABALBU 4.2 03/13/2012    BILITOT 0.29 08/13/2019    ALKPHOS 89 08/13/2019    AST 19 05/14/2020    ALT 18 05/14/2020     Lab Results   Component Value Date    WBC 12.1 05/14/2020    RBC 5.05 05/14/2020    RBC 5.26 02/22/2012    HGB 15.0 05/14/2020    HCT 47.3 05/14/2020    MCV 93.7 05/14/2020    MCH 29.7 05/14/2020    MCHC 31.7 05/14/2020 RDW 13.1 05/14/2020     05/14/2020     02/22/2012    MPV 10.5 05/14/2020     Lab Results   Component Value Date    TSH 2.92 12/11/2016     Lab Results   Component Value Date    CHOL 99 05/14/2020    HDL 27 05/14/2020    PSA 2.48 02/22/2012    LABA1C 6.8 05/14/2020       Assessment/Plan:      Diagnosis Orders   1. Controlled type 2 diabetes mellitus with complication, with long-term current use of insulin ([facility])   DIABETES FOOT EXAM    POCT glycosylated hemoglobin (Hb A1C)    CBC Auto Differential    ALT    AST    Basic Metabolic Panel    Hemoglobin A1C    Lipid Panel    Microalbumin, Ur   2. Essential hypertension     3. Need for vaccination  INFLUENZA, QUADV, 3 YRS AND OLDER, IM, MDV, 0.5ML (Yulia Early)   4. Chronic allergic rhinitis due to animal hair and dander  montelukast (SINGULAIR) 10 MG tablet       1. Stevie Story received counseling on the following healthy behaviors: nutrition, exercise and medication adherence  2. Patient given educational materials - see patient instructions  3. Was a self-tracking handout given in paper form or via [employer]? No  If yes, see orders or list here. 4.  Discussed use, benefit, and side effects of prescribed medications. Barriers to medication compliance addressed. All patient questions answered. Pt voiced understanding. 5.  Reviewed prior labs and health maintenance  6. Continue current medications, diet and exercise. Completed Refills   Requested Prescriptions     Signed Prescriptions Disp Refills    montelukast (SINGULAIR) 10 MG tablet 90 tablet 3     Sig: Take 1 tablet by mouth daily         Return in about 6 months (around 5/16/2021) for Check up.

## 2021-04-19 ENCOUNTER — OFFICE VISIT (OUTPATIENT)
Dept: PRIMARY CARE CLINIC | Age: 66
End: 2021-04-19
Payer: MEDICARE

## 2021-04-19 VITALS
DIASTOLIC BLOOD PRESSURE: 68 MMHG | SYSTOLIC BLOOD PRESSURE: 118 MMHG | HEART RATE: 96 BPM | BODY MASS INDEX: 30.93 KG/M2 | WEIGHT: 197.5 LBS | TEMPERATURE: 96.8 F | OXYGEN SATURATION: 95 % | RESPIRATION RATE: 18 BRPM

## 2021-04-19 DIAGNOSIS — S91.312S LACERATION OF LEFT FOOT, SEQUELA: Primary | ICD-10-CM

## 2021-04-19 DIAGNOSIS — Z48.02 VISIT FOR SUTURE REMOVAL: ICD-10-CM

## 2021-04-19 PROCEDURE — 1036F TOBACCO NON-USER: CPT | Performed by: NURSE PRACTITIONER

## 2021-04-19 PROCEDURE — 99213 OFFICE O/P EST LOW 20 MIN: CPT | Performed by: NURSE PRACTITIONER

## 2021-04-19 PROCEDURE — G8417 CALC BMI ABV UP PARAM F/U: HCPCS | Performed by: NURSE PRACTITIONER

## 2021-04-19 PROCEDURE — G8427 DOCREV CUR MEDS BY ELIG CLIN: HCPCS | Performed by: NURSE PRACTITIONER

## 2021-04-19 PROCEDURE — 3017F COLORECTAL CA SCREEN DOC REV: CPT | Performed by: NURSE PRACTITIONER

## 2021-04-19 ASSESSMENT — PATIENT HEALTH QUESTIONNAIRE - PHQ9
SUM OF ALL RESPONSES TO PHQ QUESTIONS 1-9: 0

## 2021-04-19 NOTE — PROGRESS NOTES
Name: Joyce Jameson  :          Chief Complaint:     Chief Complaint   Patient presents with    Suture / Staple Removal     X 7 days. Stepped on [employer]       History of Present Illness:      Joyce Jameson is a 63 y.o.  male who presents with Suture / Staple Removal (X 7 days. Stepped on [employer])      Wiliam Contreras is here today for an ER follow-up visit. Approximately a week ago he stepped on some broken glass and sustained a laceration to the bottom of the left foot. He presented to [facility] emergency department for treatment. 3 sutures were placed and he was discharged home. He has had no problems since. Suture / Staple Removal  The sutures were placed 7 to 10 days ago. He tried regular soap and water washings since the wound repair. The treatment provided significant relief. His temperature was unmeasured prior to arrival. There has been no drainage from the wound. There is no redness present. There is no swelling present. The pain has improved. He has no difficulty moving the affected extremity or digit. Past Medical History:     Past Medical History:   Diagnosis Date    Acute kidney injury ([address]     Chronic kidney disease     [facility]     Gout     Hyperlipidemia     Hypertension     MRSA infection     Osteoarthritis     Stroke ([facility]     s/p cva - loss of half of vision in left eye    Type II diabetes mellitus ([facility]       Reviewed all health maintenance requirements and ordered appropriate tests  There are no preventive care reminders to display for this patient.     Past Surgical History:     Past Surgical History:   Procedure Laterality Date    ABSCESS DRAINAGE  10/4/2015    perianal    APPENDECTOMY      COLONOSCOPY  2020    Dr. Rodger Santos serrated adenoma)diverticulosis    COLONOSCOPY N/A 2020    COLONOSCOPY POLYPECTOMY SNARE/COLD BIOPSY   hot snare and cold snare performed by Abdoul Pollock Nisha Zavala MD at [address]  3/19/13    Abdomen        Medications:       Prior to Admission medications    Medication Sig Start Date End Date Taking? Authorizing Provider   lisinopril (PRINIVIL;ZESTRIL) 5 MG tablet TAKE 1 TABLET BY MOUTH ONCE DAILY 3/23/21  Yes Dee Vines MD   SITagliptin (JANUVIA) 50 MG tablet TAKE (1) TABLET BY MOUTH DAILY 2/22/21  Yes RAY Valero CNP   montelukast (SINGULAIR) 10 MG tablet Take 1 tablet by mouth daily 11/16/20  Yes RAY Valero CNP   VICTOZA 18 MG/3ML SOPN SC injection INJECT 1.8MG SUBCUTANEOUSLY DAILY INCREASE THIS DOSE AS DIRECTED BY MD 6/29/20  Yes RAY Valero CNP   atorvastatin (LIPITOR) 40 MG tablet TAKE 1 TABLET BY MOUTH DAILY 6/1/20  Yes RAY Valero CNP   DULoxetine (CYMBALTA) 30 MG extended release capsule TAKE ONE (1) CAPSULE BY MOUTH ONCE DAILY 6/1/20  Yes RAY Valero CNP   ASPIRIN LOW DOSE 81 MG EC tablet TAKE 1 TABLET BY MOUTH EVERY DAY 6/1/20  Yes RAY Valero CNP   vitamin D3 (CHOLECALCIFEROL) 25 MCG (1000 UT) TABS tablet TAKE (1) TABLET BY MOUTH DAILY 6/1/20  Yes RAY Valero CNP   Insulin Degludec (TRESIBA FLEXTOUCH) 200 UNIT/ML SOPN Inject 66 Units into the skin daily  Patient not taking: Reported on 4/19/2021 6/29/20   RAY Valero CNP   FREESTYLE LANCETS MISC 1 each by Does not apply route 3 times daily  Patient not taking: Reported on 11/16/2020 11/14/18   RAY Valero CNP   glucose monitoring kit (FREESTYLE) monitoring kit 1 kit by Does not apply route daily  Patient not taking: Reported on 11/16/2020 11/14/18   RAY Valero CNP   blood glucose test strips (FREESTYLE LITE) strip 1 each by In Vitro route 3 times daily As needed.   Patient not taking: Reported on 11/16/2020 11/14/18   RAY Valero - CNP   Insulin Pen Needle 31G X 8 MM MISC 1 each by Does not apply route 5 times daily Diabetes type 2, uncontrolled E11.41, E11.65, Z79.4  Patient not taking: Reported on 4/19/2021 4/9/18   Clarisse Fisher, APRN - CNP   Alcohol Swabs (EASY TOUCH ALCOHOL PREP MEDIUM) 70 % PADS USE TO TEST TWICE DAILY 5/15/17   Historical Provider, MD        Allergies:       Patient has no known allergies. Social History:     Tobacco:    reports that he quit smoking about 5 years ago. His smoking use included cigars. He smoked 0.00 packs per day for 25.00 years. He has never used smokeless tobacco.  Alcohol:      reports no history of alcohol use. Drug Use:  reports no history of drug use. Family History:     Family History   Problem Relation Age of Onset    Diabetes Mother     Heart Disease Father     Diabetes Brother     Diabetes Brother     Stroke Other     Diabetes Other     Arthritis Other        Review of Systems:     Positive and Negative as described in HPI    Review of Systems    Physical Exam:   Vitals:  /68 (Position: Sitting)   Pulse 96   Temp 96.8 °F (36 °C) (Temporal)   Resp 18   Wt 197 lb 8 oz (89.6 kg)   SpO2 95%   BMI 30.93 kg/m²     Physical Exam  Vitals signs and nursing note reviewed. Constitutional:       Appearance: Normal appearance. Cardiovascular:      Rate and Rhythm: Normal rate and regular rhythm. Pulmonary:      Effort: Pulmonary effort is normal.      Breath sounds: Normal breath sounds. Musculoskeletal:        Feet:    Feet:      Comments: Sutures were removed while wound edges remained closed and well approximated. Antibiotic ointment and a Band-Aid was placed. Patient tolerated procedure well. Skin:     General: Skin is warm and dry. Findings: No erythema. Neurological:      Mental Status: He is alert.          Data:     Lab Results   Component Value Date     05/14/2020    K 4.6 05/14/2020     05/14/2020    CO2 26 05/14/2020    BUN 18 05/14/2020    CREATININE 1.28 05/14/2020    GLUCOSE 152 05/14/2020    GLUCOSE 200 03/13/2012    PROT 6.9 08/13/2019    LABALBU 3.9 08/13/2019

## 2021-04-19 NOTE — PATIENT INSTRUCTIONS
SURVEY:    You may be receiving a survey from [employer] regarding your visit today. Please complete the survey to enable us to provide the highest quality of care to you and your family. If you cannot score us a very good on any question, please call the office to discuss how we could have made your experience a very good one. Thank you.

## 2021-05-18 ENCOUNTER — HOSPITAL ENCOUNTER (OUTPATIENT)
Age: 66
Discharge: HOME | End: 2021-05-18
Payer: MEDICARE

## 2021-05-18 DIAGNOSIS — E11.8 CONTROLLED TYPE 2 DIABETES MELLITUS WITH COMPLICATION, WITH LONG-TERM CURRENT USE OF INSULIN (HCC): ICD-10-CM

## 2021-05-18 DIAGNOSIS — Z79.4 CONTROLLED TYPE 2 DIABETES MELLITUS WITH COMPLICATION, WITH LONG-TERM CURRENT USE OF INSULIN (HCC): ICD-10-CM

## 2021-05-18 LAB
ABSOLUTE EOS #: 0.22 K/UL (ref 0–0.44)
ABSOLUTE IMMATURE GRANULOCYTE: 0.05 K/UL (ref 0–0.3)
ABSOLUTE LYMPH #: 2.16 K/UL (ref 1.1–3.7)
ABSOLUTE MONO #: 0.57 K/UL (ref 0.1–1.2)
ALT SERPL-CCNC: 16 U/L (ref 5–41)
ANION GAP SERPL CALCULATED.3IONS-SCNC: 9 MMOL/L (ref 9–17)
AST SERPL-CCNC: 14 U/L
BASOPHILS # BLD: 1 % (ref 0–2)
BASOPHILS ABSOLUTE: 0.04 K/UL (ref 0–0.2)
BUN BLDV-MCNC: 17 MG/DL (ref 8–23)
BUN/CREAT BLD: 15 (ref 9–20)
CALCIUM SERPL-MCNC: 9.5 MG/DL (ref 8.6–10.4)
CHLORIDE BLD-SCNC: 104 MMOL/L (ref 98–107)
CHOLESTEROL/HDL RATIO: 5.7
CHOLESTEROL: 137 MG/DL
CO2: 25 MMOL/L (ref 20–31)
CREAT SERPL-MCNC: 1.1 MG/DL (ref 0.7–1.2)
CREATININE URINE: 126.6 MG/DL (ref 39–259)
DIFFERENTIAL TYPE: ABNORMAL
EOSINOPHILS RELATIVE PERCENT: 3 % (ref 1–4)
ESTIMATED AVERAGE GLUCOSE: 263 MG/DL
GFR AFRICAN AMERICAN: >60 ML/MIN
GFR NON-AFRICAN AMERICAN: >60 ML/MIN
GFR SERPL CREATININE-BSD FRML MDRD: ABNORMAL ML/MIN/{1.73_M2}
GFR SERPL CREATININE-BSD FRML MDRD: ABNORMAL ML/MIN/{1.73_M2}
GLUCOSE BLD-MCNC: 287 MG/DL (ref 70–99)
HBA1C MFR BLD: 10.8 % (ref 4–6)
HCT VFR BLD CALC: 46.9 % (ref 40.7–50.3)
HDLC SERPL-MCNC: 24 MG/DL
HEMOGLOBIN: 15.3 G/DL (ref 13–17)
IMMATURE GRANULOCYTES: 1 %
LDL CHOLESTEROL: 44 MG/DL (ref 0–130)
LYMPHOCYTES # BLD: 25 % (ref 24–43)
MCH RBC QN AUTO: 29.7 PG (ref 25.2–33.5)
MCHC RBC AUTO-ENTMCNC: 32.6 G/DL (ref 28.4–34.8)
MCV RBC AUTO: 90.9 FL (ref 82.6–102.9)
MICROALBUMIN/CREAT 24H UR: 19 MG/L
MICROALBUMIN/CREAT UR-RTO: 15 MCG/MG CREAT
MONOCYTES # BLD: 7 % (ref 3–12)
NRBC AUTOMATED: 0 PER 100 WBC
PDW BLD-RTO: 12.5 % (ref 11.8–14.4)
PLATELET # BLD: 193 K/UL (ref 138–453)
PLATELET ESTIMATE: ABNORMAL
PMV BLD AUTO: 10.3 FL (ref 8.1–13.5)
POTASSIUM SERPL-SCNC: 5.2 MMOL/L (ref 3.7–5.3)
RBC # BLD: 5.16 M/UL (ref 4.21–5.77)
RBC # BLD: ABNORMAL 10*6/UL
SEG NEUTROPHILS: 63 % (ref 36–65)
SEGMENTED NEUTROPHILS ABSOLUTE COUNT: 5.63 K/UL (ref 1.5–8.1)
SODIUM BLD-SCNC: 138 MMOL/L (ref 135–144)
TRIGL SERPL-MCNC: 346 MG/DL
VLDLC SERPL CALC-MCNC: ABNORMAL MG/DL (ref 1–30)
WBC # BLD: 8.7 K/UL (ref 3.5–11.3)
WBC # BLD: ABNORMAL 10*3/UL

## 2021-05-18 PROCEDURE — 80061 LIPID PANEL: CPT

## 2021-05-18 PROCEDURE — 84460 ALANINE AMINO (ALT) (SGPT): CPT

## 2021-05-18 PROCEDURE — 85025 COMPLETE CBC W/AUTO DIFF WBC: CPT

## 2021-05-18 PROCEDURE — 84450 TRANSFERASE (AST) (SGOT): CPT

## 2021-05-18 PROCEDURE — 80048 BASIC METABOLIC PNL TOTAL CA: CPT

## 2021-05-18 PROCEDURE — 82570 ASSAY OF URINE CREATININE: CPT

## 2021-05-18 PROCEDURE — 36415 COLL VENOUS BLD VENIPUNCTURE: CPT

## 2021-05-18 PROCEDURE — 83036 HEMOGLOBIN GLYCOSYLATED A1C: CPT

## 2021-05-18 PROCEDURE — 82043 UR ALBUMIN QUANTITATIVE: CPT

## 2021-05-21 ENCOUNTER — OFFICE VISIT (OUTPATIENT)
Dept: PRIMARY CARE CLINIC | Age: 66
End: 2021-05-21
Payer: MEDICARE

## 2021-05-21 VITALS
TEMPERATURE: 97.9 F | DIASTOLIC BLOOD PRESSURE: 86 MMHG | OXYGEN SATURATION: 96 % | WEIGHT: 192.2 LBS | HEART RATE: 96 BPM | SYSTOLIC BLOOD PRESSURE: 128 MMHG | HEIGHT: 67 IN | RESPIRATION RATE: 18 BRPM | BODY MASS INDEX: 30.17 KG/M2

## 2021-05-21 DIAGNOSIS — L98.9 LESION OF NECK: ICD-10-CM

## 2021-05-21 DIAGNOSIS — E11.8 CONTROLLED TYPE 2 DIABETES MELLITUS WITH COMPLICATION, WITH LONG-TERM CURRENT USE OF INSULIN (HCC): Primary | ICD-10-CM

## 2021-05-21 DIAGNOSIS — L84 FOOT CALLUS: ICD-10-CM

## 2021-05-21 DIAGNOSIS — M72.2 PLANTAR FASCIITIS, LEFT: ICD-10-CM

## 2021-05-21 DIAGNOSIS — Z79.4 CONTROLLED TYPE 2 DIABETES MELLITUS WITH COMPLICATION, WITH LONG-TERM CURRENT USE OF INSULIN (HCC): Primary | ICD-10-CM

## 2021-05-21 PROBLEM — J20.9 ACUTE BRONCHITIS WITH COPD (HCC): Status: ACTIVE | Noted: 2021-05-21

## 2021-05-21 PROBLEM — J44.0 ACUTE BRONCHITIS WITH COPD (HCC): Status: ACTIVE | Noted: 2021-05-21

## 2021-05-21 PROCEDURE — 2022F DILAT RTA XM EVC RTNOPTHY: CPT | Performed by: NURSE PRACTITIONER

## 2021-05-21 PROCEDURE — 1036F TOBACCO NON-USER: CPT | Performed by: NURSE PRACTITIONER

## 2021-05-21 PROCEDURE — G8417 CALC BMI ABV UP PARAM F/U: HCPCS | Performed by: NURSE PRACTITIONER

## 2021-05-21 PROCEDURE — 99214 OFFICE O/P EST MOD 30 MIN: CPT | Performed by: NURSE PRACTITIONER

## 2021-05-21 PROCEDURE — G8427 DOCREV CUR MEDS BY ELIG CLIN: HCPCS | Performed by: NURSE PRACTITIONER

## 2021-05-21 PROCEDURE — 3046F HEMOGLOBIN A1C LEVEL >9.0%: CPT | Performed by: NURSE PRACTITIONER

## 2021-05-21 PROCEDURE — 3017F COLORECTAL CA SCREEN DOC REV: CPT | Performed by: NURSE PRACTITIONER

## 2021-05-21 ASSESSMENT — ENCOUNTER SYMPTOMS
WHEEZING: 0
SORE THROAT: 0
CONSTIPATION: 0
DIARRHEA: 0
COUGH: 0
SHORTNESS OF BREATH: 0
ABDOMINAL PAIN: 0
RHINORRHEA: 0
NAUSEA: 0
VOMITING: 0

## 2021-05-21 ASSESSMENT — SOCIAL DETERMINANTS OF HEALTH (SDOH): HOW HARD IS IT FOR YOU TO PAY FOR THE VERY BASICS LIKE FOOD, HOUSING, MEDICAL CARE, AND HEATING?: NOT HARD AT ALL

## 2021-05-21 NOTE — PROGRESS NOTES
His weight is stable. He is following a generally healthy diet. When asked about meal planning, he reported none. He participates in exercise daily. His home blood glucose trend is increasing steadily. His breakfast blood glucose range is generally >200 mg/dl. An ACE inhibitor/angiotensin II receptor blocker is being taken. He does not see a podiatrist.Eye exam is not current. Hypertension  This is a chronic problem. The current episode started more than 1 year ago. The problem is unchanged. The problem is controlled. Pertinent negatives include no chest pain, headaches, neck pain, palpitations, peripheral edema or shortness of breath. There are no associated agents to hypertension. Risk factors for coronary artery disease include diabetes mellitus, dyslipidemia and male gender. Past treatments include ACE inhibitors. The current treatment provides moderate improvement. Compliance problems include exercise and diet. Hypertensive end-organ damage includes CVA. There is no history of kidney disease, CAD/MI or heart failure. Identifiable causes of hypertension include chronic renal disease. Past Medical History:     Past Medical History:   Diagnosis Date    [facility] ([facility]     Chronic kidney disease     Diabetic neuropathy ([facility]     Gout     Hyperlipidemia     Hypertension     MRSA infection     Osteoarthritis     Stroke ([address] 2004    s/p cva - loss of half of vision in left eye    Type II diabetes mellitus ([facility] 1999      Reviewed all health maintenance requirements and ordered appropriate tests  There are no preventive care reminders to display for this patient.     Past Surgical History:     Past Surgical History:   Procedure Laterality Date    ABSCESS DRAINAGE  10/4/2015    perianal    APPENDECTOMY      COLONOSCOPY  09/14/2020    Dr. Khanna Side serrated adenoma)diverticulosis    COLONOSCOPY N/A 9/14/2020    COLONOSCOPY POLYPECTOMY SNARE/COLD BIOPSY   hot Allergies:       Patient has no known allergies. Social History:     Tobacco:    reports that he quit smoking about 5 years ago. His smoking use included cigars. He smoked 0.00 packs per day for 25.00 years. He has never used smokeless tobacco.  Alcohol:      reports no history of alcohol use. Drug Use:  reports no history of drug use. Family History:     Family History   Problem Relation Age of Onset    Diabetes Mother     Heart Disease Father     Diabetes Brother     Diabetes Brother     Stroke Other     Diabetes Other     Arthritis Other        Review of Systems:     Positive and Negative as described in HPI    Review of Systems   Constitutional: Negative for chills, fatigue and fever. HENT: Negative for congestion, ear pain, rhinorrhea and sore throat. Eyes: Negative for visual disturbance. Respiratory: Negative for cough, shortness of breath and wheezing. Cardiovascular: Negative for chest pain and palpitations. Gastrointestinal: Negative for abdominal pain, constipation, diarrhea, nausea and vomiting. Endocrine: Negative for polydipsia, polyphagia and polyuria. Genitourinary: Negative for difficulty urinating and dysuria. Musculoskeletal: Negative for neck pain and neck stiffness. Skin: Positive for wound (foot). Negative for rash. Neurological: Negative for dizziness, syncope, light-headedness and headaches. Physical Exam:   Vitals:  /86 (Site: Left Upper Arm, Position: Sitting, Cuff Size: Large Adult)   Pulse 96   Temp 97.9 °F (36.6 °C) (Temporal)   Resp 18   Ht 5' 7\" (1.702 m)   Wt 192 lb 3.2 oz (87.2 kg)   SpO2 96%   BMI 30.10 kg/m²     Physical Exam  Vitals and nursing note reviewed. Constitutional:       General: He is not in acute distress. Appearance: He is well-developed. HENT:      Mouth/Throat:      Mouth: Mucous membranes are moist.      Pharynx: No posterior oropharyngeal erythema.    Eyes:      General: No scleral ALT 16 05/18/2021     Lab Results   Component Value Date    WBC 8.7 05/18/2021    RBC 5.16 05/18/2021    RBC 5.26 02/22/2012    HGB 15.3 05/18/2021    HCT 46.9 05/18/2021    MCV 90.9 05/18/2021    MCH 29.7 05/18/2021    MCHC 32.6 05/18/2021    RDW 12.5 05/18/2021     05/18/2021     02/22/2012    MPV 10.3 05/18/2021     Lab Results   Component Value Date    TSH 2.92 12/11/2016     Lab Results   Component Value Date    CHOL 137 05/18/2021    HDL 24 05/18/2021    PSA 2.48 02/22/2012    LABA1C 10.8 05/18/2021       Assessment/Plan:      Diagnosis Orders   1. Controlled type 2 diabetes mellitus with complication, with long-term current use of insulin (HCC)  SITagliptin (JANUVIA) 50 MG tablet   2. Plantar fasciitis, left  External Referral To Podiatry   3. Foot callus  External Referral To Podiatry   4. Lesion of neck  [place], [place], General Surgery, [place]     Increase Tresiba to 68 units daily. Decrease sugar intake. Continue all other medications. Call with fasting blood sugars next week. Referral to podiatry made. Referral to general surgery for lesion of neck made. We will see him back in 3 months for A1c in the office. Sooner if any problems. 1.  Lilian Giordano received counseling on the following healthy behaviors: nutrition, exercise and medication adherence  2. Patient given educational materials - see patient instructions  3. Was a self-tracking handout given in paper form or via [employer]? No  If yes, see orders or list here. 4.  Discussed use, benefit, and side effects of prescribed medications. Barriers to medication compliance addressed. All patient questions answered. Pt voiced understanding. 5.  Reviewed prior labs and health maintenance  6. Continue current medications, diet and exercise.     Completed Refills   Requested Prescriptions     Signed Prescriptions Disp Refills    SITagliptin (JANUVIA) 50 MG tablet 90 tablet 3     Sig: TAKE (1) TABLET BY MOUTH DAILY Return in about 3 months (around 8/21/2021) for Check up- A1c in office.

## 2021-05-21 NOTE — PATIENT INSTRUCTIONS
SURVEY:    You may be receiving a survey from [employer] regarding your visit today. Please complete the survey to enable us to provide the highest quality of care to you and your family. If you cannot score us a very good on any question, please call the office to discuss how we could have made your experience a very good one. Thank you.   Joana Fisher, APRN-CNP  Yousif Hart, APRN-CNP  ALVARO Rojas LPN [place], [place] Dorean Crutch, [place] Pam, PCA

## 2021-07-01 ENCOUNTER — TELEPHONE (OUTPATIENT)
Dept: PRIMARY CARE CLINIC | Age: 66
End: 2021-07-01

## 2021-07-01 NOTE — TELEPHONE ENCOUNTER
Irina from [address] [place] [address] calling to request EMG results to be faxed to [phone] once we receive the results.  Thank you

## 2021-08-02 ENCOUNTER — APPOINTMENT (OUTPATIENT)
Dept: CT IMAGING | Age: 66
End: 2021-08-02
Payer: MEDICARE

## 2021-08-02 ENCOUNTER — HOSPITAL ENCOUNTER (EMERGENCY)
Age: 66
Discharge: HOME | End: 2021-08-02
Attending: EMERGENCY MEDICINE
Payer: MEDICARE

## 2021-08-02 VITALS
TEMPERATURE: 96.8 F | OXYGEN SATURATION: 96 % | RESPIRATION RATE: 20 BRPM | HEART RATE: 76 BPM | WEIGHT: 184 LBS | SYSTOLIC BLOOD PRESSURE: 150 MMHG | DIASTOLIC BLOOD PRESSURE: 92 MMHG | BODY MASS INDEX: 28.82 KG/M2

## 2021-08-02 DIAGNOSIS — G44.59 OTHER COMPLICATED HEADACHE SYNDROME: Primary | ICD-10-CM

## 2021-08-02 DIAGNOSIS — H60.502 ACUTE OTITIS EXTERNA OF LEFT EAR, UNSPECIFIED TYPE: ICD-10-CM

## 2021-08-02 PROCEDURE — 6360000002 HC RX W HCPCS: Performed by: EMERGENCY MEDICINE

## 2021-08-02 PROCEDURE — 70450 CT HEAD/BRAIN W/O DYE: CPT

## 2021-08-02 PROCEDURE — 99283 EMERGENCY DEPT VISIT LOW MDM: CPT

## 2021-08-02 PROCEDURE — 96375 TX/PRO/DX INJ NEW DRUG ADDON: CPT

## 2021-08-02 PROCEDURE — 96374 THER/PROPH/DIAG INJ IV PUSH: CPT

## 2021-08-02 PROCEDURE — 2580000003 HC RX 258: Performed by: EMERGENCY MEDICINE

## 2021-08-02 RX ORDER — 0.9 % SODIUM CHLORIDE 0.9 %
1000 INTRAVENOUS SOLUTION INTRAVENOUS ONCE
Status: COMPLETED | OUTPATIENT
Start: 2021-08-02 | End: 2021-08-02

## 2021-08-02 RX ORDER — CIPROFLOXACIN/HYDROCORTISONE 0.2 %-1 %
3 SUSPENSION, DROPS(FINAL DOSAGE FORM)(ML) OTIC (EAR) 2 TIMES DAILY
Qty: 1 BOTTLE | Refills: 0 | Status: SHIPPED | OUTPATIENT
Start: 2021-08-02 | End: 2021-08-09

## 2021-08-02 RX ORDER — DIPHENHYDRAMINE HYDROCHLORIDE 50 MG/ML
25 INJECTION INTRAMUSCULAR; INTRAVENOUS ONCE
Status: COMPLETED | OUTPATIENT
Start: 2021-08-02 | End: 2021-08-02

## 2021-08-02 RX ORDER — METOCLOPRAMIDE HYDROCHLORIDE 5 MG/ML
10 INJECTION INTRAMUSCULAR; INTRAVENOUS ONCE
Status: COMPLETED | OUTPATIENT
Start: 2021-08-02 | End: 2021-08-02

## 2021-08-02 RX ORDER — DEXAMETHASONE SODIUM PHOSPHATE 10 MG/ML
10 INJECTION INTRAMUSCULAR; INTRAVENOUS ONCE
Status: COMPLETED | OUTPATIENT
Start: 2021-08-02 | End: 2021-08-02

## 2021-08-02 RX ADMIN — METOCLOPRAMIDE 10 MG: 5 INJECTION, SOLUTION INTRAMUSCULAR; INTRAVENOUS at 07:29

## 2021-08-02 RX ADMIN — DEXAMETHASONE SODIUM PHOSPHATE 10 MG: 10 INJECTION INTRAMUSCULAR; INTRAVENOUS at 07:30

## 2021-08-02 RX ADMIN — DIPHENHYDRAMINE HYDROCHLORIDE 25 MG: 50 INJECTION, SOLUTION INTRAMUSCULAR; INTRAVENOUS at 07:30

## 2021-08-02 RX ADMIN — SODIUM CHLORIDE 1000 ML: 9 INJECTION, SOLUTION INTRAVENOUS at 07:29

## 2021-08-02 ASSESSMENT — ENCOUNTER SYMPTOMS
SHORTNESS OF BREATH: 0
NAUSEA: 0
ABDOMINAL PAIN: 0
VOMITING: 0
PHOTOPHOBIA: 0
COUGH: 0

## 2021-08-02 ASSESSMENT — PAIN DESCRIPTION - DESCRIPTORS: DESCRIPTORS: HEADACHE

## 2021-08-02 ASSESSMENT — PAIN DESCRIPTION - PAIN TYPE: TYPE: ACUTE PAIN

## 2021-08-02 ASSESSMENT — PAIN DESCRIPTION - LOCATION: LOCATION: HEAD

## 2021-08-02 ASSESSMENT — PAIN SCALES - GENERAL: PAINLEVEL_OUTOF10: 8

## 2021-08-02 ASSESSMENT — PAIN DESCRIPTION - ORIENTATION: ORIENTATION: LEFT

## 2021-08-02 NOTE — ED PROVIDER NOTES
[facility] ED  eMERGENCY dEPARTMENT eNCOUnter      Pt Name: Carolina Royal  MRN: [id number]  [place] 1955  Date of evaluation: 8/2/2021  Provider: Kings Villanueva, [address] [place] [address]       Chief Complaint   Patient presents with    Headache     onset yesterday upon waking up; noted to left side of head         HISTORY OF PRESENT ILLNESS   (Location/Symptom, Timing/Onset, Context/Setting, Quality, Duration, Modifying Factors, Severity) Note limiting factors. HPI    Carolina Royal is a 63 y.o. male who presents to the emergency department with complaint of headache. Patient states 2 to 3 days ago he noticed a headache mainly in his left forehead and cheek region. He denies any recent trauma fevers chills neck pain or change in vision. He also reports he has had some left ear discomfort and discharge from it. He states he does not typically get headaches and with his ear discomfort and now headache he is concern for possible infectious process and therefore comes in for evaluation    Nursing Notes were reviewed. REVIEW OF SYSTEMS    (2+ for level 4; 10+ for level 5)   Review of Systems   Constitutional: Negative for chills and fever. HENT: Positive for congestion, ear discharge and ear pain. Eyes: Negative for photophobia and visual disturbance. Respiratory: Negative for cough and shortness of breath. Gastrointestinal: Negative for abdominal pain, nausea and vomiting. Musculoskeletal: Negative for neck pain and neck stiffness. Skin: Negative for rash. Neurological: Positive for headaches. All other systems reviewed and are negative.       PAST MEDICAL HISTORY     Past Medical History:   Diagnosis Date    Acute kidney injury ([address]     Chronic kidney disease     Diabetic neuropathy ([address]     Gout     Hyperlipidemia     Hypertension     MRSA infection     Osteoarthritis     Stroke ([facility] 2004    s/p cva - loss of half of vision in left eye    Type II diabetes mellitus ([facility] [address] 1999       SURGICAL HISTORY       Past Surgical History:   Procedure Laterality Date    ABSCESS DRAINAGE  10/4/2015    perianal    APPENDECTOMY      COLONOSCOPY  09/14/2020    Dr. Pierson American serrated adenoma)diverticulosis    COLONOSCOPY N/A 9/14/2020    COLONOSCOPY POLYPECTOMY SNARE/COLD BIOPSY   hot snare and cold snare performed by Sonu Reyna MD at [address]  3/19/13    Abdomen       CURRENT MEDICATIONS       Previous Medications    ALCOHOL SWABS (EASY TOUCH ALCOHOL PREP MEDIUM) 70 % PADS    USE TO TEST TWICE DAILY    ASPIRIN LOW DOSE 81 MG EC TABLET    TAKE 1 TABLET BY MOUTH EVERY DAY    ATORVASTATIN (LIPITOR) 40 MG TABLET    TAKE 1 TABLET BY MOUTH DAILY    BLOOD GLUCOSE TEST STRIPS (FREESTYLE LITE) STRIP    1 each by In Vitro route 3 times daily As needed. DULOXETINE (CYMBALTA) 30 MG EXTENDED RELEASE CAPSULE    TAKE (1) CAPSULE BY MOUTH ONCE DAILY    FREESTYLE LANCETS MISC    1 each by Does not apply route 3 times daily    GLUCOSE MONITORING KIT (FREESTYLE) MONITORING KIT    1 kit by Does not apply route daily    INSULIN PEN NEEDLE 31G X 8 MM MISC    1 each by Does not apply route 5 times daily Diabetes type 2, uncontrolled E11.41, E11.65, Z79.4    LISINOPRIL (PRINIVIL;ZESTRIL) 5 MG TABLET    TAKE 1 TABLET BY MOUTH ONCE DAILY    MONTELUKAST (SINGULAIR) 10 MG TABLET    Take 1 tablet by mouth daily    SITAGLIPTIN (JANUVIA) 50 MG TABLET    TAKE (1) TABLET BY MOUTH DAILY    TRESIBA FLEXTOUCH 200 UNIT/ML SOPN    INJECT 66 UNITS SUBCUTANEOUSLY DAILY    VICTOZA 18 MG/3ML SOPN SC INJECTION    INJECT 1.8MG SUBCUTANEOUSLY DAILY *DOSE INCREASE AS DIRECTED BY DR*    VITAMIN D (D3-1000) 25 MCG (1000 UT) CAPS    TAKE 1 CAPSULE BY MOUTH DAILY       ALLERGIES     Patient has no known allergies.     FAMILY HISTORY       Family History   Problem Relation Age of Onset    Diabetes Mother     Heart Disease Father     Diabetes Brother     Diabetes Brother     Stroke Other     Diabetes Other     Arthritis Other         SOCIAL HISTORY       Social History     Socioeconomic History    Marital status:      Spouse name: None    Number of children: None    Years of education: None    Highest education level: None   Occupational History    None   Tobacco Use    Smoking status: Current Every Day Smoker     Packs/day: 0.00     Years: 25.00     Pack years: 0.00     Types: Cigars     Last attempt to quit: 10/2/2015     Years since quittin.8    Smokeless tobacco: Never Used    Tobacco comment: trying to quit, smokes little cigars   Vaping Use    Vaping Use: Never used   Substance and Sexual Activity    Alcohol use: No    Drug use: No    Sexual activity: Yes     Partners: Female   Other Topics Concern    None   Social History Narrative    None     Social Determinants of Health     Financial Resource Strain: Low Risk     Difficulty of Paying Living Expenses: Not hard at all   Food Insecurity: No Food Insecurity    Worried About Running Out of Food in the Last Year: Never true    Natalia of Food in the Last Year: Never true   Transportation Needs:     Lack of Transportation (Medical):  Lack of Transportation (Non-Medical):    Physical Activity:     Days of Exercise per Week:     Minutes of Exercise per Session:    Stress:     Feeling of Stress :    Social Connections:     Frequency of Communication with Friends and Family:     Frequency of Social Gatherings with Friends and Family:     Attends Sabianist Services:     Active Member of Clubs or Organizations:     Attends Club or Organization Meetings:     Marital Status:    Intimate Partner Violence:     Fear of Current or Ex-Partner:     Emotionally Abused:     Physically Abused:     Sexually Abused:        SCREENINGS           PHYSICAL EXAM    (up to 7 for level 4, 8 or more for level 5)   @[email or web address]    Physical Exam  Vitals and nursing note reviewed.    Constitutional: General: He is not in acute distress. Appearance: Normal appearance. He is not ill-appearing, toxic-appearing or diaphoretic. HENT:      Head: Normocephalic and atraumatic. Right Ear: Tympanic membrane, ear canal and external ear normal.      Left Ear: External ear normal.      Ears:      Comments: Left canal is slightly erythematous and swollen with small amount of discharge present. The left TM is retracted without obvious secondary changes to suggest infection. Nose: Congestion present. Eyes:      General: No scleral icterus. Right eye: No discharge. Left eye: No discharge. Extraocular Movements: Extraocular movements intact. Conjunctiva/sclera: Conjunctivae normal.      Pupils: Pupils are equal, round, and reactive to light. Cardiovascular:      Rate and Rhythm: Normal rate and regular rhythm. Pulses: Normal pulses. Heart sounds: Normal heart sounds. No murmur heard. No friction rub. No gallop. Pulmonary:      Effort: Pulmonary effort is normal. No respiratory distress. Breath sounds: Normal breath sounds. No wheezing, rhonchi or rales. Musculoskeletal:         General: No swelling, tenderness, deformity or signs of injury. Normal range of motion. Cervical back: Normal range of motion and neck supple. No rigidity. Skin:     General: Skin is warm and dry. Capillary Refill: Capillary refill takes less than 2 seconds. Findings: No erythema or rash. Neurological:      General: No focal deficit present. Mental Status: He is alert and oriented to person, place, and time. Cranial Nerves: No cranial nerve deficit. Comments: Cranial nerves II through XII are grossly intact there are no focal neurologic deficits. No pronator drift no dysmetria no truncal ataxia   Psychiatric:         Mood and Affect: Mood normal.         Behavior: Behavior normal.         Thought Content:  Thought content normal.         Judgment: Judgment normal.         DIAGNOSTIC RESULTS     EKG (Per Emergency Physician):       RADIOLOGY (Per Emergency Physician): Interpretation per the Radiologist below, if available at the time of this note:  CT Head WO Contrast    Result Date: 8/2/2021  EXAMINATION: CT OF THE HEAD WITHOUT CONTRAST  8/2/2021 7:38 am TECHNIQUE: CT of the head was performed without the administration of intravenous contrast. Dose modulation, iterative reconstruction, and/or weight based adjustment of the mA/kV was utilized to reduce the radiation dose to as low as reasonably achievable. COMPARISON: 08/29/2015 HISTORY: ORDERING SYSTEM PROVIDED HISTORY: headache TECHNOLOGIST PROVIDED HISTORY: headache Decision Support Exception - unselect if not a suspected or confirmed emergency medical condition->Emergency Medical Condition (MA) FINDINGS: BRAIN/VENTRICLES: There is no acute intracranial hemorrhage, mass effect or midline shift. No abnormal extra-axial fluid collection. The gray-white differentiation is maintained without evidence of an acute infarct. There is no evidence of hydrocephalus. ORBITS: The visualized portion of the orbits demonstrate no acute abnormality. SINUSES: The visualized paranasal sinuses and mastoid air cells demonstrate no acute abnormality. SOFT TISSUES/SKULL:  No acute abnormality of the visualized skull or soft tissues. No acute intracranial abnormality. ED BEDSIDE ULTRASOUND:   Performed by ED Physician - none    LABS:  Labs Reviewed - No data to display     All other labs were within normal range or not returned as of this dictation.     EMERGENCY DEPARTMENT COURSE and DIFFERENTIAL DIAGNOSIS/MDM:   Vitals:    Vitals:    08/02/21 0656 08/02/21 0700 08/02/21 0746   BP: (!) 170/105 (!) 171/114 139/88   Pulse: 79  78   Resp: 20  20   Temp: 96.8 °F (36 °C)     TempSrc: Tympanic     SpO2: 99%  98%   Weight: 184 lb (83.5 kg)         Medications   0.9 % sodium chloride bolus (1,000 mLs Intravenous New Bag 8/2/21 0729)   metoclopramide (REGLAN) injection 10 mg (10 mg Intravenous Given 8/2/21 0729)   diphenhydrAMINE (BENADRYL) injection 25 mg (25 mg Intravenous Given 8/2/21 0730)   dexamethasone (DECADRON) injection 10 mg (10 mg Intravenous Given 8/2/21 0730)       MDM. Patient presented to the ER afebrile with no meningeal signs and no report or signs of trauma. With his left-sided headache which she states is not normal for him I did elect to perform a head CT. CT revealed no acute intracranial findings. His exam did show changes consistent with left otitis media which is most likely the cause for his symptoms. However there are no signs to suggest mastoiditis or malignant otitis externa. Patient was medicated with IV fluids Benadryl Reglan and Decadron and did report moderate improvement of his headache. On reevaluation his neuro exam remains normal.  Therefore at this time as headache is improving he does not have CT findings of underlying neurologic event and he does not show changes consistent with malignant otitis externa patient replaced on antibiotic eardrops for the otitis externa and discharged home    REVAL:         Valente Mcmillan 124 time was minutes, excluding separately reportable procedures. There was a high probability of clinically significant/life threatening deterioration in the patient's condition which required my urgent intervention. CONSULTS:  None    PROCEDURES:  Unless otherwise noted below, none     Procedures    FINAL IMPRESSION      1. Other complicated headache syndrome    2.  Acute otitis externa of left ear, unspecified type          DISPOSITION/PLAN   DISPOSITION        PATIENT REFERRED TO:  [facility] Angel Fisher, [address]  [phone]    Schedule an appointment as soon as possible for a visit in 1 week  If symptoms worsen      DISCHARGE MEDICATIONS:  New Prescriptions    CIPROFLOXACIN-HYDROCORTISONE (CIPRO HC) 0.2-1 % OTIC SUSPENSION    Place 3 drops into the left ear 2 times daily for 7 days          (Please note:  Portions of this note were completed with a voice recognition program.  Efforts were made to edit the dictations but occasionally words and phrases are mis-transcribed.)  Form v2016. [id number].[id number]    Jon Santiago DO (electronically signed)  Emergency Medicine Provider       DO Abel  08/02/21 8644

## 2021-09-07 ENCOUNTER — HOSPITAL ENCOUNTER (OUTPATIENT)
Dept: VASCULAR LAB | Age: 66
Discharge: HOME | End: 2021-09-09
Payer: MEDICARE

## 2021-09-07 DIAGNOSIS — E11.49 TYPE 2 DIABETES MELLITUS WITH NEUROLOGICAL MANIFESTATIONS (HCC): ICD-10-CM

## 2021-09-07 DIAGNOSIS — I70.223: ICD-10-CM

## 2021-09-09 ENCOUNTER — HOSPITAL ENCOUNTER (OUTPATIENT)
Dept: VASCULAR LAB | Age: 66
Discharge: HOME | End: 2021-09-11
Payer: MEDICARE

## 2021-09-09 DIAGNOSIS — I70.223 ATHEROSCLEROSIS OF NATIVE ARTERY OF BOTH LOWER EXTREMITIES WITH REST PAIN (HCC): ICD-10-CM

## 2021-09-09 DIAGNOSIS — E11.49 TYPE 2 DIABETES MELLITUS WITH NEUROLOGICAL MANIFESTATIONS (HCC): ICD-10-CM

## 2021-09-09 DIAGNOSIS — E11.49 TYPE II DIABETES MELLITUS WITH NEUROLOGICAL MANIFESTATIONS (HCC): ICD-10-CM

## 2021-09-09 DIAGNOSIS — I70.223: ICD-10-CM

## 2021-09-09 PROCEDURE — 93923 UPR/LXTR ART STDY 3+ LVLS: CPT

## 2021-09-22 NOTE — TELEPHONE ENCOUNTER
Health Maintenance   Topic Date Due    A1C test (Diabetic or Prediabetic)  08/18/2021    Flu vaccine (1) 09/01/2021    Shingles Vaccine (1 of 2) 11/16/2021 (Originally 11/15/2007)    COVID-19 Vaccine (1) 04/19/2022 (Originally 11/15/1967)    Diabetic retinal exam  04/29/2022 (Originally 12/13/2017)    Diabetic foot exam  11/16/2021    Lipid screen  05/18/2022    Potassium monitoring  05/18/2022    Creatinine monitoring  05/18/2022    Pneumococcal 0-64 years Vaccine (2 of 2 - PPSV23) 04/09/2023    Colon cancer screen colonoscopy  09/14/2023    DTaP/Tdap/Td vaccine (3 - Td or Tdap) 04/12/2031    Hepatitis C screen  Completed    HIV screen  Completed    Hepatitis A vaccine  Aged Out    Hib vaccine  Aged Out    Meningococcal (ACWY) vaccine  Aged Out             (applicable per patient's age: Cancer Screenings, Depression Screening, Fall Risk Screening, Immunizations)    Hemoglobin A1C (%)   Date Value   05/18/2021 10.8 (H)   11/16/2020 8.6   05/14/2020 6.8 (H)     Microalb/Crt.  Ratio (mcg/mg creat)   Date Value   05/18/2021 15     LDL Cholesterol (mg/dL)   Date Value   05/18/2021 44     AST (U/L)   Date Value   05/18/2021 14     ALT (U/L)   Date Value   05/18/2021 16     BUN (mg/dL)   Date Value   05/18/2021 17      (goal A1C is < 7)   (goal LDL is <100) need 30-50% reduction from baseline     BP Readings from Last 3 Encounters:   08/02/21 (!) 150/92   05/21/21 128/86   04/19/21 118/68    (goal /80)      All Future Testing planned in [facility]:  Lab Frequency Next Occurrence       Next Visit Date:  Future Appointments   Date Time Provider Penelope Pham   10/14/2021  9:20 AM Dorothy Fisher, APRN - CNP [facility]            Patient Active Problem List:     Type 2 diabetes mellitus with hyperglycemia, with long-term current use of insulin (HCC)     Dyslipidemia     Gout     Essential hypertension     CKD (chronic kidney disease) stage 3, GFR 30-59 ml/min (HCC)     BLANE (acute kidney injury) [facility])     Diabetic polyneuropathy associated with type 2 diabetes mellitus ([facility] [address]     Fatty liver     Normocytic normochromic anemia     Positive FIT (fecal immunochemical test)

## 2021-10-14 ENCOUNTER — OFFICE VISIT (OUTPATIENT)
Dept: PRIMARY CARE CLINIC | Age: 66
End: 2021-10-14
Payer: MEDICARE

## 2021-10-14 ENCOUNTER — HOSPITAL ENCOUNTER (OUTPATIENT)
Age: 66
Discharge: HOME | End: 2021-10-14
Payer: MEDICARE

## 2021-10-14 ENCOUNTER — HOSPITAL ENCOUNTER (OUTPATIENT)
Dept: LAB | Age: 66
Discharge: HOME | End: 2021-10-14
Payer: MEDICARE

## 2021-10-14 ENCOUNTER — TELEPHONE (OUTPATIENT)
Dept: PRIMARY CARE CLINIC | Age: 66
End: 2021-10-14

## 2021-10-14 VITALS
DIASTOLIC BLOOD PRESSURE: 94 MMHG | WEIGHT: 191.3 LBS | SYSTOLIC BLOOD PRESSURE: 142 MMHG | RESPIRATION RATE: 20 BRPM | HEART RATE: 96 BPM | OXYGEN SATURATION: 96 % | BODY MASS INDEX: 29.96 KG/M2 | TEMPERATURE: 98.2 F

## 2021-10-14 DIAGNOSIS — E78.5 DYSLIPIDEMIA: ICD-10-CM

## 2021-10-14 DIAGNOSIS — Z20.822 CLOSE EXPOSURE TO COVID-19 VIRUS: ICD-10-CM

## 2021-10-14 LAB
ANION GAP SERPL CALCULATED.3IONS-SCNC: 11 MMOL/L (ref 9–17)
BUN BLDV-MCNC: 18 MG/DL (ref 8–23)
BUN/CREAT BLD: 16 (ref 9–20)
CALCIUM SERPL-MCNC: 9.6 MG/DL (ref 8.6–10.4)
CHLORIDE BLD-SCNC: 101 MMOL/L (ref 98–107)
CO2: 25 MMOL/L (ref 20–31)
CREAT SERPL-MCNC: 1.15 MG/DL (ref 0.7–1.2)
GFR AFRICAN AMERICAN: >60 ML/MIN
GFR NON-AFRICAN AMERICAN: >60 ML/MIN
GFR SERPL CREATININE-BSD FRML MDRD: ABNORMAL ML/MIN/{1.73_M2}
GFR SERPL CREATININE-BSD FRML MDRD: ABNORMAL ML/MIN/{1.73_M2}
GLUCOSE BLD-MCNC: 307 MG/DL (ref 70–99)
HBA1C MFR BLD: 9.3 %
POTASSIUM SERPL-SCNC: 4.5 MMOL/L (ref 3.7–5.3)
SODIUM BLD-SCNC: 137 MMOL/L (ref 135–144)

## 2021-10-14 PROCEDURE — G8427 DOCREV CUR MEDS BY ELIG CLIN: HCPCS | Performed by: NURSE PRACTITIONER

## 2021-10-14 PROCEDURE — C9803 HOPD COVID-19 SPEC COLLECT: HCPCS

## 2021-10-14 PROCEDURE — 80048 BASIC METABOLIC PNL TOTAL CA: CPT

## 2021-10-14 PROCEDURE — 4004F PT TOBACCO SCREEN RCVD TLK: CPT | Performed by: NURSE PRACTITIONER

## 2021-10-14 PROCEDURE — G8484 FLU IMMUNIZE NO ADMIN: HCPCS | Performed by: NURSE PRACTITIONER

## 2021-10-14 PROCEDURE — 36415 COLL VENOUS BLD VENIPUNCTURE: CPT

## 2021-10-14 PROCEDURE — G8417 CALC BMI ABV UP PARAM F/U: HCPCS | Performed by: NURSE PRACTITIONER

## 2021-10-14 PROCEDURE — 99214 OFFICE O/P EST MOD 30 MIN: CPT | Performed by: NURSE PRACTITIONER

## 2021-10-14 PROCEDURE — 3017F COLORECTAL CA SCREEN DOC REV: CPT | Performed by: NURSE PRACTITIONER

## 2021-10-14 PROCEDURE — 83036 HEMOGLOBIN GLYCOSYLATED A1C: CPT | Performed by: NURSE PRACTITIONER

## 2021-10-14 PROCEDURE — 3046F HEMOGLOBIN A1C LEVEL >9.0%: CPT | Performed by: NURSE PRACTITIONER

## 2021-10-14 PROCEDURE — U0005 INFEC AGEN DETEC AMPLI PROBE: HCPCS

## 2021-10-14 PROCEDURE — 2022F DILAT RTA XM EVC RTNOPTHY: CPT | Performed by: NURSE PRACTITIONER

## 2021-10-14 PROCEDURE — U0003 INFECTIOUS AGENT DETECTION BY NUCLEIC ACID (DNA OR RNA); SEVERE ACUTE RESPIRATORY SYNDROME CORONAVIRUS 2 (SARS-COV-2) (CORONAVIRUS DISEASE [COVID-19]), AMPLIFIED PROBE TECHNIQUE, MAKING USE OF HIGH THROUGHPUT TECHNOLOGIES AS DESCRIBED BY CMS-2020-01-R: HCPCS

## 2021-10-14 RX ORDER — LANCETS 28 GAUGE
1 EACH MISCELLANEOUS 3 TIMES DAILY
Qty: 100 EACH | Refills: 11 | Status: SHIPPED | OUTPATIENT
Start: 2021-10-14

## 2021-10-14 RX ORDER — INSULIN DEGLUDEC 200 U/ML
INJECTION, SOLUTION SUBCUTANEOUS
Qty: 9 ML | Refills: 10 | Status: SHIPPED | OUTPATIENT
Start: 2021-10-14 | End: 2022-01-14 | Stop reason: SDUPTHER

## 2021-10-14 RX ORDER — LIRAGLUTIDE 6 MG/ML
INJECTION SUBCUTANEOUS
Qty: 9 ML | Refills: 10 | Status: SHIPPED | OUTPATIENT
Start: 2021-10-14 | End: 2022-04-21

## 2021-10-14 RX ORDER — BLOOD-GLUCOSE METER
1 KIT MISCELLANEOUS DAILY
Qty: 1 KIT | Refills: 0 | Status: SHIPPED | OUTPATIENT
Start: 2021-10-14 | End: 2022-01-14

## 2021-10-14 RX ORDER — ATORVASTATIN CALCIUM 40 MG/1
40 TABLET, FILM COATED ORAL DAILY
Qty: 90 TABLET | Refills: 3 | Status: SHIPPED | OUTPATIENT
Start: 2021-10-14 | End: 2022-10-25

## 2021-10-14 RX ORDER — BLOOD-GLUCOSE METER
1 KIT MISCELLANEOUS 3 TIMES DAILY
Qty: 100 EACH | Refills: 11 | Status: SHIPPED | OUTPATIENT
Start: 2021-10-14

## 2021-10-14 RX ORDER — BLOOD-GLUCOSE METER
1 KIT MISCELLANEOUS DAILY
Qty: 1 KIT | Refills: 0 | Status: CANCELLED | OUTPATIENT
Start: 2021-10-14

## 2021-10-14 RX ORDER — DULOXETIN HYDROCHLORIDE 30 MG/1
CAPSULE, DELAYED RELEASE ORAL
Qty: 90 CAPSULE | Refills: 3 | Status: SHIPPED | OUTPATIENT
Start: 2021-10-14 | End: 2022-10-17

## 2021-10-14 RX ORDER — UBIDECARENONE 100 MG
CAPSULE ORAL
Qty: 90 CAPSULE | Refills: 3 | Status: SHIPPED | OUTPATIENT
Start: 2021-10-14 | End: 2022-10-17

## 2021-10-14 ASSESSMENT — ENCOUNTER SYMPTOMS
RHINORRHEA: 0
WHEEZING: 0
DIARRHEA: 0
COUGH: 0
ABDOMINAL PAIN: 0
SHORTNESS OF BREATH: 0
NAUSEA: 0
VOMITING: 0
SORE THROAT: 0
CONSTIPATION: 0

## 2021-10-14 NOTE — PROGRESS NOTES
Name: Henrry Bolanos  :          Chief Complaint:     Chief Complaint   Patient presents with    Results     EMG, done in [place], feet pain- numbness, tingling    Diabetes     no problems       History of Present Illness:      Henrry Bolanos is a 63 y.o.  male who presents with Results (EMG, done in [place], feet pain- numbness, tingling) and Diabetes (no problems)      Jarrett Almanzar is here today for a routine office visit. Covid exposure-patient states he has been taking his grandson back and forth to school and practice every day and spends a great amount of time with him on a daily basis. States he tested positive for Covid 2 days ago. Patient is concerned and would like a Covid test as he is unvaccinated. Diabetes  He presents for his follow-up diabetic visit. He has type 2 diabetes mellitus. His disease course has been improving (SLIGHTLY). There are no hypoglycemic associated symptoms. Pertinent negatives for hypoglycemia include no dizziness or headaches. Associated symptoms include foot paresthesias. Pertinent negatives for diabetes include no chest pain, no fatigue, no polydipsia, no polyphagia and no polyuria. There are no hypoglycemic complications. Symptoms are stable. Diabetic complications include a CVA, nephropathy and peripheral neuropathy. Pertinent negatives for diabetic complications include no heart disease. Risk factors for coronary artery disease include diabetes mellitus, dyslipidemia, hypertension and male sex. Current diabetic treatment includes oral agent (dual therapy) and insulin injections. He is compliant with treatment all of the time. His weight is stable. He is following a generally healthy diet. When asked about meal planning, he reported none. He participates in exercise daily. His home blood glucose trend is decreasing steadily. His breakfast blood glucose range is generally 180-200 mg/dl. An ACE inhibitor/angiotensin II receptor blocker is being taken.  He does not see a podiatrist.Eye exam is not current. Hyperlipidemia  This is a chronic problem. The current episode started more than 1 year ago. The problem is controlled. Recent lipid tests were reviewed and are normal. Exacerbating diseases include chronic renal disease and diabetes. He has no history of hypothyroidism, liver disease, obesity or nephrotic syndrome. Factors aggravating his hyperlipidemia include fatty foods. Pertinent negatives include no chest pain, leg pain, myalgias or shortness of breath. Current antihyperlipidemic treatment includes statins. The current treatment provides moderate improvement of lipids. Compliance problems include adherence to exercise and adherence to diet. Risk factors for coronary artery disease include diabetes mellitus, dyslipidemia, family history, obesity, male sex, stress and a sedentary lifestyle. Past Medical History:     Past Medical History:   Diagnosis Date    [facility]     Chronic kidney disease     Diabetic neuropathy ([address]     Gout     Hyperlipidemia     Hypertension     MRSA infection     Osteoarthritis     Stroke ([facility] 2004    s/p cva - loss of half of vision in left eye    Type II diabetes mellitus ([facility] [address] 1999      Reviewed all health maintenance requirements and ordered appropriate tests  Health Maintenance Due   Topic Date Due    Flu vaccine (1) 09/01/2021       Past Surgical History:     Past Surgical History:   Procedure Laterality Date    ABSCESS DRAINAGE  10/4/2015    perianal    APPENDECTOMY      COLONOSCOPY  09/14/2020    Dr. Elli Alexis serrated adenoma)diverticulosis    COLONOSCOPY N/A 9/14/2020    COLONOSCOPY POLYPECTOMY SNARE/COLD BIOPSY   hot snare and cold snare performed by Amy Scruggs MD at [address]  3/19/13    Abdomen        Medications:       Prior to Admission medications    Medication Sig Start Date End Date Taking?  Authorizing Provider blood glucose test strips (FREESTYLE LITE) strip 1 each by In Vitro route 3 times daily As needed. 10/14/21  Yes Micky Fisher, RAY Campbell CNP   FreeStyle Lancets MISC 1 each by Does not apply route 3 times daily 10/14/21  Yes ARY Hernandez CNP   glucose monitoring (FREESTYLE FREEDOM) kit 1 kit by Does not apply route daily 10/14/21  Yes Micky Fisher, RAY Campbell CNP   DULoxetine (CYMBALTA) 30 MG extended release capsule TAKE (1) CAPSULE BY MOUTH ONCE DAILY 10/14/21  Yes Micky Fisher, RAY Campbell CNP   atorvastatin (LIPITOR) 40 MG tablet Take 1 tablet by mouth daily 10/14/21  Yes RAY Hernandez CNP   vitamin D (D3-1000) 25 MCG (1000 UT) CAPS TAKE 1 CAPSULE BY MOUTH DAILY 10/14/21  Yes Micky Fisher, RAY Campbell CNP   Insulin Degludec (TRESIBA FLEXTOUCH) 200 UNIT/ML SOPN INJECT 66 UNITS SUBCUTANEOUSLY DAILY 10/14/21  Yes Micky Fisher, RAY Campbell CNP   Liraglutide (VICTOZA) 18 MG/3ML SOPN SC injection INJECT 1.8MG SUBCUTANEOUSLY DAILY *DOSE INCREASE AS DIRECTED BY DR* 10/14/21  Yes Micky Fisher, RAY Campbell CNP   SITagliptin (JANUVIA) 50 MG tablet TAKE (1) TABLET BY MOUTH DAILY 2/21/21  Yes Micky Fisher, RAY Campbell CNP   ASPIRIN LOW DOSE 81 MG EC tablet TAKE 1 TABLET BY MOUTH EVERY DAY 5/17/21  Yes Micky Fisher, RAY Campbell CNP   lisinopril (PRINIVIL;ZESTRIL) 5 MG tablet TAKE 1 TABLET BY MOUTH ONCE DAILY 3/23/21  Yes Jodee Walsh MD   montelukast (SINGULAIR) 10 MG tablet Take 1 tablet by mouth daily 11/16/20  Yes Micky Fisher, RAY Campbell CNP   Insulin Pen Needle 31G X 8 MM MISC 1 each by Does not apply route 5 times daily Diabetes type 2, uncontrolled E11.41, E11.65, Z79.4 9/22/21   Micky Fisher, RAY Campbell CNP   glucose monitoring kit (FREESTYLE) monitoring kit 1 kit by Does not apply route daily 11/14/18   Micky Mitchell Might, APRN - CNP   Alcohol Swabs (EASY TOUCH ALCOHOL PREP MEDIUM) 70 % PADS USE TO TEST TWICE DAILY 5/15/17   Historical Provider, MD        Allergies:       Patient has no known allergies.     Social History:     Tobacco: reports that he has been smoking cigars. He has been smoking about 0.00 packs per day for the past 25.00 years. He has never used smokeless tobacco.  Alcohol:      reports no history of alcohol use. Drug Use:  reports no history of drug use. Family History:     Family History   Problem Relation Age of Onset    Diabetes Mother     Heart Disease Father     Diabetes Brother     Diabetes Brother     Stroke Other     Diabetes Other     Arthritis Other        Review of Systems:     Positive and Negative as described in HPI    Review of Systems   Constitutional: Negative for chills, fatigue and fever. HENT: Negative for congestion, rhinorrhea and sore throat. Eyes: Negative for visual disturbance. Respiratory: Negative for cough, shortness of breath and wheezing. Cardiovascular: Negative for chest pain and palpitations. Gastrointestinal: Negative for abdominal pain, constipation, diarrhea, nausea and vomiting. Endocrine: Negative for polydipsia, polyphagia and polyuria. Genitourinary: Negative for difficulty urinating and dysuria. Musculoskeletal: Negative for gait problem, myalgias, neck pain and neck stiffness. Skin: Negative for rash. Neurological: Positive for numbness. Negative for dizziness, syncope, light-headedness and headaches. Physical Exam:   Vitals:  BP (!) 142/94 (Site: Left Upper Arm, Position: Sitting, Cuff Size: Large Adult)   Pulse 96   Temp 98.2 °F (36.8 °C) (Temporal)   Resp 20   Wt 191 lb 4.8 oz (86.8 kg)   SpO2 96%   BMI 29.96 kg/m²     Physical Exam  Vitals and nursing note reviewed. Constitutional:       General: He is not in acute distress. Appearance: He is well-developed. HENT:      Mouth/Throat:      Mouth: Mucous membranes are moist.      Pharynx: No posterior oropharyngeal erythema. Eyes:      General: No scleral icterus. Cardiovascular:      Rate and Rhythm: Normal rate and regular rhythm. Heart sounds: No murmur heard. Pulmonary:      Effort: Pulmonary effort is normal.      Breath sounds: Normal breath sounds. No wheezing. Abdominal:      General: Bowel sounds are normal. There is no distension. Palpations: Abdomen is soft. Tenderness: There is no abdominal tenderness. Musculoskeletal:      Cervical back: Normal range of motion and neck supple. Right lower leg: No edema. Left lower leg: No edema. Lymphadenopathy:      Cervical: No cervical adenopathy. Skin:     General: Skin is warm and dry. Findings: No rash. Neurological:      Mental Status: He is alert and oriented to person, place, and time. Psychiatric:         Mood and Affect: Mood is not anxious or depressed. Behavior: Behavior normal.         Data:     Lab Results   Component Value Date     05/18/2021    K 5.2 05/18/2021     05/18/2021    CO2 25 05/18/2021    BUN 17 05/18/2021    CREATININE 1.10 05/18/2021    GLUCOSE 287 05/18/2021    GLUCOSE 200 03/13/2012    PROT 6.9 08/13/2019    LABALBU 3.9 08/13/2019    LABALBU 4.2 03/13/2012    BILITOT 0.29 08/13/2019    ALKPHOS 89 08/13/2019    AST 14 05/18/2021    ALT 16 05/18/2021     Lab Results   Component Value Date    WBC 8.7 05/18/2021    RBC 5.16 05/18/2021    RBC 5.26 02/22/2012    HGB 15.3 05/18/2021    HCT 46.9 05/18/2021    MCV 90.9 05/18/2021    MCH 29.7 05/18/2021    MCHC 32.6 05/18/2021    RDW 12.5 05/18/2021     05/18/2021     02/22/2012    MPV 10.3 05/18/2021     Lab Results   Component Value Date    TSH 2.92 12/11/2016     Lab Results   Component Value Date    CHOL 137 05/18/2021    HDL 24 05/18/2021    PSA 2.48 02/22/2012    LABA1C 9.3 10/14/2021       Assessment/Plan:      Diagnosis Orders   1.  Uncontrolled type 2 diabetes mellitus with chronic kidney disease, with long-term current use of insulin ([facility])  blood glucose test strips (FREESTYLE LITE) strip    FreeStyle Lancets MISC    glucose monitoring (FREESTYLE FREEDOM) kit    POCT glycosylated hemoglobin (Hb A1C)    Basic Metabolic Panel    vitamin D (D3-1000) 25 MCG (1000 UT) CAPS    Insulin Degludec (TRESIBA FLEXTOUCH) 200 UNIT/ML SOPN    Liraglutide (VICTOZA) 18 MG/3ML SOPN SC injection   2. Dyslipidemia  atorvastatin (LIPITOR) 40 MG tablet   3. Close exposure to COVID-19 virus  COVID-19     Continue current medications. A1c is slightly improved. Covid test we will follow with results. Quarantine until results. We will see him back in 3 months, sooner if any problems. All medications have been refilled and testing supplies been sent to the pharmacy. 1.  Carlos Mayer received counseling on the following healthy behaviors: nutrition, exercise and medication adherence  2. Patient given educational materials - see patient instructions  3. Was a self-tracking handout given in paper form or via [employer]? No  If yes, see orders or list here. 4.  Discussed use, benefit, and side effects of prescribed medications. Barriers to medication compliance addressed. All patient questions answered. Pt voiced understanding. 5.  Reviewed prior labs and health maintenance  6. Continue current medications, diet and exercise. Completed Refills   Requested Prescriptions     Signed Prescriptions Disp Refills    blood glucose test strips (FREESTYLE LITE) strip 100 each 11     Si each by In Vitro route 3 times daily As needed.     FreeStyle Lancets MISC 100 each 11     Si each by Does not apply route 3 times daily    glucose monitoring (FREESTYLE FREEDOM) kit 1 kit 0     Si kit by Does not apply route daily    DULoxetine (CYMBALTA) 30 MG extended release capsule 90 capsule 3     Sig: TAKE (1) CAPSULE BY MOUTH ONCE DAILY    atorvastatin (LIPITOR) 40 MG tablet 90 tablet 3     Sig: Take 1 tablet by mouth daily    vitamin D (D3-1000) 25 MCG (1000 UT) CAPS 90 capsule 3     Sig: TAKE 1 CAPSULE BY MOUTH DAILY    Insulin Degludec (TRESIBA FLEXTOUCH) 200 UNIT/ML SOPN 9 mL 10     Sig: INJECT 66 UNITS SUBCUTANEOUSLY DAILY    Liraglutide (VICTOZA) 18 MG/3ML SOPN SC injection 9 mL 10     Sig: INJECT 1.8MG SUBCUTANEOUSLY DAILY *DOSE INCREASE AS DIRECTED BY DR*         Return in about 3 months (around 1/14/2022) for Check up.

## 2021-10-14 NOTE — TELEPHONE ENCOUNTER
----- Message from [address] [place] [address], APRN - CNP sent at 10/14/2021  2:57 PM EDT -----  Results are stable. Thank you.

## 2021-10-15 ENCOUNTER — TELEPHONE (OUTPATIENT)
Dept: PRIMARY CARE CLINIC | Age: 66
End: 2021-10-15

## 2021-10-15 LAB
SARS-COV-2: NORMAL
SARS-COV-2: NOT DETECTED
SOURCE: NORMAL

## 2021-10-15 NOTE — TELEPHONE ENCOUNTER
----- Message from RAY Castro CNP sent at 10/15/2021  1:16 PM EDT -----  Results are normal, please call patient and make them aware.

## 2022-01-14 ENCOUNTER — OFFICE VISIT (OUTPATIENT)
Dept: PRIMARY CARE CLINIC | Age: 67
End: 2022-01-14
Payer: MEDICARE

## 2022-01-14 ENCOUNTER — TELEPHONE (OUTPATIENT)
Dept: PRIMARY CARE CLINIC | Age: 67
End: 2022-01-14

## 2022-01-14 VITALS
OXYGEN SATURATION: 98 % | BODY MASS INDEX: 30.92 KG/M2 | HEART RATE: 88 BPM | RESPIRATION RATE: 22 BRPM | DIASTOLIC BLOOD PRESSURE: 82 MMHG | SYSTOLIC BLOOD PRESSURE: 136 MMHG | TEMPERATURE: 97.7 F | WEIGHT: 197.4 LBS

## 2022-01-14 DIAGNOSIS — E78.5 DYSLIPIDEMIA: ICD-10-CM

## 2022-01-14 DIAGNOSIS — L21.9 SEBORRHEIC DERMATITIS: ICD-10-CM

## 2022-01-14 LAB — HBA1C MFR BLD: 11.3 %

## 2022-01-14 PROCEDURE — 2022F DILAT RTA XM EVC RTNOPTHY: CPT | Performed by: NURSE PRACTITIONER

## 2022-01-14 PROCEDURE — 83036 HEMOGLOBIN GLYCOSYLATED A1C: CPT | Performed by: NURSE PRACTITIONER

## 2022-01-14 PROCEDURE — 3046F HEMOGLOBIN A1C LEVEL >9.0%: CPT | Performed by: NURSE PRACTITIONER

## 2022-01-14 PROCEDURE — 99214 OFFICE O/P EST MOD 30 MIN: CPT | Performed by: NURSE PRACTITIONER

## 2022-01-14 PROCEDURE — 3017F COLORECTAL CA SCREEN DOC REV: CPT | Performed by: NURSE PRACTITIONER

## 2022-01-14 PROCEDURE — G8484 FLU IMMUNIZE NO ADMIN: HCPCS | Performed by: NURSE PRACTITIONER

## 2022-01-14 PROCEDURE — G8427 DOCREV CUR MEDS BY ELIG CLIN: HCPCS | Performed by: NURSE PRACTITIONER

## 2022-01-14 PROCEDURE — G8417 CALC BMI ABV UP PARAM F/U: HCPCS | Performed by: NURSE PRACTITIONER

## 2022-01-14 PROCEDURE — 4004F PT TOBACCO SCREEN RCVD TLK: CPT | Performed by: NURSE PRACTITIONER

## 2022-01-14 RX ORDER — INSULIN LISPRO 100 [IU]/ML
6 INJECTION, SOLUTION INTRAVENOUS; SUBCUTANEOUS
Qty: 5 PEN | Refills: 5 | Status: SHIPPED | OUTPATIENT
Start: 2022-01-14 | End: 2022-05-04 | Stop reason: SDUPTHER

## 2022-01-14 RX ORDER — LACTOBACILLUS RHAMNOSUS GG 10B CELL
1 CAPSULE ORAL DAILY
Qty: 90 TABLET | Refills: 3 | Status: SHIPPED | OUTPATIENT
Start: 2022-01-14

## 2022-01-14 RX ORDER — INSULIN DEGLUDEC 200 U/ML
INJECTION, SOLUTION SUBCUTANEOUS
Qty: 9 ML | Refills: 5 | Status: SHIPPED | OUTPATIENT
Start: 2022-01-14 | End: 2022-05-04 | Stop reason: SDUPTHER

## 2022-01-14 RX ORDER — LISINOPRIL 5 MG/1
TABLET ORAL
Qty: 90 TABLET | Refills: 3 | Status: SHIPPED | OUTPATIENT
Start: 2022-01-14

## 2022-01-14 ASSESSMENT — PATIENT HEALTH QUESTIONNAIRE - PHQ9
SUM OF ALL RESPONSES TO PHQ QUESTIONS 1-9: 0
SUM OF ALL RESPONSES TO PHQ9 QUESTIONS 1 & 2: 0
SUM OF ALL RESPONSES TO PHQ QUESTIONS 1-9: 0
2. FEELING DOWN, DEPRESSED OR HOPELESS: 0
1. LITTLE INTEREST OR PLEASURE IN DOING THINGS: 0

## 2022-01-14 ASSESSMENT — ENCOUNTER SYMPTOMS
ABDOMINAL PAIN: 0
NAUSEA: 0
WHEEZING: 0
CONSTIPATION: 0
DIARRHEA: 0
COUGH: 0
RHINORRHEA: 0
SORE THROAT: 0
SHORTNESS OF BREATH: 0
VOMITING: 0

## 2022-01-14 NOTE — PATIENT INSTRUCTIONS
SURVEY:     You may be receiving a survey from [employer] regarding your visit today. Please complete the survey to enable us to provide the highest quality of care to you and your family. If you cannot score us a very good on any question, please call the office to discuss how we could have made your experience a very good one. Thank you. Mery Fisher, APRN-GABRIELLA  Edwardo Reuben, CNP  Elhamálvaro Rodriguez, LPN  Jodie Chapin, LPN  Gino Spani, A  Danita Ndiaye, [facility]  Aury, CMA  Dionna, PCA    Patient Education        Counting Carbohydrates for Diabetes: Care Instructions  Your Care Instructions     You don't have to eat special foods when you have diabetes. You just have to be careful to eat healthy foods. Carbohydrates (carbs) raise blood sugar higher and quicker than any other nutrient. Carbs are found in desserts, breads and cereals, and fruit. They're also in starchy vegetables. These include potatoes, corn, and grains such as rice and pasta. Carbs are also in milk and yogurt. The more carbs you eat at one time, the higher your blood sugar will rise. Spreading carbs all through the day helps keep your blood sugar levels within your target range. Counting carbs is one of the best ways to keep your blood sugar under control. If you use insulin, counting carbs helps you match the right amount of insulin to the number of grams of carbs in a meal. Then you can change your diet and insulin dose as needed. Testing your blood sugar several times a day can help you learn how carbs affect your blood sugar. A registered dietitian or certified diabetes educator can help you plan meals and snacks. Follow-up care is a key part of your treatment and safety. Be sure to make and go to all appointments, and call your doctor if you are having problems. It's also a good idea to know your test results and keep a list of the medicines you take. How can you care for yourself at home?   Know your daily amount of carbohydrates  Your daily amount depends on several things, such as your weight, how active you are, which diabetes medicines you take, and what your goals are for your blood sugar levels. A registered dietitian or certified diabetes educator can help you plan how many carbs to include in each meal and snack. For most adults, a guideline for the daily amount of carbs is:  · 45 to 60 grams at each meal. That's about the same as 3 to 4 carbohydrate servings. · 15 to 20 grams at each snack. That's about the same as 1 carbohydrate serving. Count carbs  Counting carbs lets you know how much rapid-acting insulin to take before you eat. If you use an insulin pump, you get a constant rate of insulin during the day. So the pump must be programmed at meals. This gives you extra insulin to cover the rise in blood sugar after meals. If you take insulin:  · Learn your own insulin-to-carb ratio. You and your diabetes health professional will figure out the ratio. You can do this by testing your blood sugar after meals. For example, you may need a certain amount of insulin for every 15 grams of carbs. · Add up the carb grams in a meal. Then you can figure out how many units of insulin to take based on your insulin-to-carb ratio. · Exercise lowers blood sugar. You can use less insulin than you would if you were not doing exercise. Keep in mind that timing matters. If you exercise within 1 hour after a meal, your body may need less insulin for that meal than it would if you exercised 3 hours after the meal. Test your blood sugar to find out how exercise affects your need for insulin. If you do or don't take insulin:  · Look at labels on packaged foods. This can tell you how many carbs are in a serving. You can also use guides from the American Diabetes Association. · Be aware of portions, or serving sizes. If a package has two servings and you eat the whole package, you need to double the number of grams of carbohydrate listed for one serving.   · Protein, fat, and fiber do not raise blood sugar as much as carbs do. If you eat a lot of these nutrients in a meal, your blood sugar will rise more slowly than it would otherwise. Eat from all food groups  · Eat at least three meals a day. · Plan meals to include food from all the food groups. The food groups include grains, fruits, dairy, proteins, and vegetables. · Talk to your dietitian or diabetes educator about ways to add limited amounts of sweets into your meal plan. · If you drink alcohol, talk to your doctor. It may not be recommended when you are taking certain diabetes medicines. Where can you learn more? Go to https://ProperspeMaventeb.ThriveOn. org and sign in to your [employer] account. Enter S868 in the ITN box to learn more about \"Counting Carbohydrates for Diabetes: Care Instructions. \"     If you do not have an account, please click on the \"Sign Up Now\" link. Current as of: July 28, 2021               Content Version: 13.1  © 2006-2021 [employer]. Care instructions adapted under license by [facility] ([facility]). If you have questions about a medical condition or this instruction, always ask your healthcare professional. [place] [address] any warranty or liability for your use of this information.

## 2022-01-14 NOTE — PROGRESS NOTES
Name: Epi Ruiz  :          Chief Complaint:     Chief Complaint   Patient presents with    Diabetes     routine check       History of Present Illness:      Epi Ruiz is a 61 y.o.  male who presents with Diabetes (routine check)      Naomy Antonio is here today for a routine office visit. DMcontinues to worsen, patient states he has been eating a lot of fruit and drinking orange juice. He is compliant with his medications. See below for further comment. Seborrheic dermatitisintermittent, patient mainly has problems around the nose and in his ears. Patient has tried over-the-counter moisturizers with no relief. Diabetes  He presents for his follow-up diabetic visit. He has type 2 diabetes mellitus. His disease course has been worsening (SLIGHTLY). There are no hypoglycemic associated symptoms. Pertinent negatives for hypoglycemia include no dizziness or headaches. Associated symptoms include foot paresthesias. Pertinent negatives for diabetes include no chest pain, no fatigue, no polydipsia, no polyphagia and no polyuria. There are no hypoglycemic complications. Symptoms are stable. Diabetic complications include a CVA, nephropathy and peripheral neuropathy. Pertinent negatives for diabetic complications include no heart disease. Risk factors for coronary artery disease include diabetes mellitus, dyslipidemia, hypertension and male sex. Current diabetic treatment includes oral agent (dual therapy) and insulin injections. He is compliant with treatment all of the time. His weight is stable. He is following a generally healthy diet. When asked about meal planning, he reported none. He participates in exercise daily. His home blood glucose trend is decreasing steadily. His breakfast blood glucose range is generally 180-200 mg/dl. An ACE inhibitor/angiotensin II receptor blocker is being taken. He does not see a podiatrist.Eye exam is not current.    Hyperlipidemia  This is a chronic problem. The current episode started more than 1 year ago. The problem is controlled. Recent lipid tests were reviewed and are normal. Exacerbating diseases include chronic renal disease and diabetes. He has no history of hypothyroidism, liver disease, obesity or nephrotic syndrome. Factors aggravating his hyperlipidemia include fatty foods. Pertinent negatives include no chest pain, leg pain, myalgias or shortness of breath. Current antihyperlipidemic treatment includes statins. The current treatment provides moderate improvement of lipids. Compliance problems include adherence to exercise and adherence to diet. Risk factors for coronary artery disease include diabetes mellitus, dyslipidemia, family history, obesity, male sex, stress and a sedentary lifestyle. 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 Past Surgical History:     Past Surgical History:   Procedure Laterality Date    ABSCESS DRAINAGE  10/4/2015    perianal    APPENDECTOMY      COLONOSCOPY  09/14/2020    Dr. Juan Daniel Mackenzie serrated adenoma)diverticulosis    COLONOSCOPY N/A 9/14/2020    COLONOSCOPY POLYPECTOMY SNARE/COLD BIOPSY   hot snare and cold snare performed by Kaur Acharya MD at [address]  3/19/13    Abdomen        Medications:       Prior to Admission medications    Medication Sig Start Date End Date Taking?  Authorizing Provider   SITagliptin (JANUVIA) 50 MG tablet TAKE (1) TABLET BY MOUTH DAILY 1/14/22  Yes Evelio Galindo Might, APRN - CNP   lisinopril (PRINIVIL;ZESTRIL) 5 MG tablet TAKE 1 TABLET BY MOUTH ONCE DAILY 1/14/22  Yes RAY Ruiz CNP   Probiotic Product (CULTURELLE PROBIOTICS) CHEW Take 1 each by mouth daily 1/14/22  Yes RAY Ruiz CNP   Insulin Degludec (TRESIBA FLEXTOUCH) 200 UNIT/ML SOPN INJECT 70 UNITS SUBCUTANEOUSLY DAILY 1/14/22  Yes RAY Ruiz CNP   insulin lispro, 1 Unit Dial, (HUMALOG KWIKPEN) 100 UNIT/ML SOPN Inject 6 Units into the skin 3 times daily (before meals) 1/14/22  Yes RAY Ruiz CNP   Insulin Pen Needle 31G X 8 MM MISC 1 each by Does not apply route 5 times daily Diabetes type 2, uncontrolled E11.41, E11.65, Z79.4 1/14/22  Yes RAY Ruiz CNP   triamcinolone (KENALOG) 0.1 % ointment Apply topically 2 times daily for 7 days 1/14/22 1/21/22 Yes RAY Ruiz CNP   montelukast (SINGULAIR) 10 MG tablet TAKE 1 TABLET BY MOUTH ONCE DAILY *EMERGENCY REFILL* 11/18/21  Yes RAY Ruiz CNP   ASPIRIN LOW DOSE 81 MG EC tablet TAKE 1 TABLET BY MOUTH EVERY DAY 11/18/21  Yes RAY Ruiz CNP   blood glucose test strips (FREESTYLE LITE) strip 1 each by In Vitro route 3 times daily As needed.  10/14/21  Yes RAY Ruiz CNP   FreeStyle Lancets MISC 1 each by Does not apply route 3 times daily 10/14/21  Yes RAY Ruiz CNP   DULoxetine (CYMBALTA) 30 MG extended release capsule TAKE (1) CAPSULE BY MOUTH ONCE DAILY 10/14/21  Yes RAY Ruiz CNP   atorvastatin (LIPITOR) 40 MG tablet Take 1 tablet by mouth daily 10/14/21  Yes RAY Ruiz CNP   vitamin D (D3-1000) 25 MCG (1000 UT) CAPS TAKE 1 CAPSULE BY MOUTH DAILY 10/14/21  Yes RAY Ruiz CNP   Liraglutide (VICTOZA) 18 MG/3ML SOPN SC injection INJECT 1.8MG SUBCUTANEOUSLY DAILY *DOSE INCREASE AS DIRECTED BY DR* 10/14/21  Yes RAY Ruiz CNP   glucose monitoring kit (FREESTYLE) monitoring kit 1 kit by Does not apply route daily 11/14/18  Yes Tessy Guaman Phillip, APRN - CNP   Alcohol Swabs (EASY TOUCH ALCOHOL PREP MEDIUM) 70 % PADS USE TO TEST TWICE DAILY 5/15/17  Yes Historical Provider, MD        Allergies:       Patient has no known allergies. Social History:     Tobacco:    reports that he has been smoking cigars. He has been smoking about 0.00 packs per day for the past 25.00 years. He has never used smokeless tobacco.  Alcohol:      reports no history of alcohol use. Drug Use:  reports no history of drug use. Family History:     Family History   Problem Relation Age of Onset    Diabetes Mother     Heart Disease Father     Diabetes Brother     Diabetes Brother     Stroke Other     Diabetes Other     Arthritis Other        Review of Systems:     Positive and Negative as described in HPI    Review of Systems   Constitutional: Negative for chills, fatigue and fever. HENT: Negative for congestion, rhinorrhea and sore throat. Eyes: Negative for visual disturbance. Respiratory: Negative for cough, shortness of breath and wheezing. Cardiovascular: Negative for chest pain and palpitations. Gastrointestinal: Negative for abdominal pain, constipation, diarrhea, nausea and vomiting. Endocrine: Negative for polydipsia, polyphagia and polyuria. Genitourinary: Negative for difficulty urinating and dysuria. Musculoskeletal: Negative for gait problem, myalgias, neck pain and neck stiffness. Skin: Positive for rash. Neurological: Positive for numbness. Negative for dizziness, syncope, light-headedness and headaches. Physical Exam:   Vitals:  /82 (Position: Sitting)   Pulse 88   Temp 97.7 °F (36.5 °C) (Temporal)   Resp 22   Wt 197 lb 6.4 oz (89.5 kg)   SpO2 98%   BMI 30.92 kg/m²     Physical Exam  Vitals and nursing note reviewed. Constitutional:       General: He is not in acute distress. Appearance: He is well-developed.    HENT:      Head:        Comments: Erythematous, scaly, rash     Mouth/Throat:      Mouth: Mucous membranes are moist.      Pharynx: No posterior oropharyngeal erythema. Eyes:      General: No scleral icterus. Cardiovascular:      Rate and Rhythm: Normal rate and regular rhythm. Heart sounds: No murmur heard. Pulmonary:      Effort: Pulmonary effort is normal.      Breath sounds: Normal breath sounds. No wheezing. Abdominal:      General: Bowel sounds are normal. There is no distension. Palpations: Abdomen is soft. Tenderness: There is no abdominal tenderness. Musculoskeletal:      Cervical back: Normal range of motion and neck supple. Right lower leg: No edema. Left lower leg: No edema. Lymphadenopathy:      Cervical: No cervical adenopathy. Skin:     General: Skin is warm and dry. Findings: No rash. Neurological:      Mental Status: He is alert and oriented to person, place, and time. Psychiatric:         Mood and Affect: Mood is not anxious or depressed. Behavior: Behavior normal.         Data:     Lab Results   Component Value Date     10/14/2021    K 4.5 10/14/2021     10/14/2021    CO2 25 10/14/2021    BUN 18 10/14/2021    CREATININE 1.15 10/14/2021    GLUCOSE 307 10/14/2021    GLUCOSE 200 03/13/2012    PROT 6.9 08/13/2019    LABALBU 3.9 08/13/2019    LABALBU 4.2 03/13/2012    BILITOT 0.29 08/13/2019    ALKPHOS 89 08/13/2019    AST 14 05/18/2021    ALT 16 05/18/2021     Lab Results   Component Value Date    WBC 8.7 05/18/2021    RBC 5.16 05/18/2021    RBC 5.26 02/22/2012    HGB 15.3 05/18/2021    HCT 46.9 05/18/2021    MCV 90.9 05/18/2021    MCH 29.7 05/18/2021    MCHC 32.6 05/18/2021    RDW 12.5 05/18/2021     05/18/2021     02/22/2012    MPV 10.3 05/18/2021     Lab Results   Component Value Date    TSH 2.92 12/11/2016     Lab Results   Component Value Date    CHOL 137 05/18/2021    HDL 24 05/18/2021    PSA 2.48 02/22/2012    LABA1C 11.3 01/14/2022       Assessment/Plan:      Diagnosis Orders   1.  Uncontrolled type 2 diabetes mellitus with chronic kidney disease, with long-term current use of insulin (HCC)  POCT glycosylated hemoglobin (Hb A1C)    SITagliptin (JANUVIA) 50 MG tablet    lisinopril (PRINIVIL;ZESTRIL) 5 MG tablet    Insulin Degludec (TRESIBA FLEXTOUCH) 200 UNIT/ML SOPN    insulin lispro, 1 Unit Dial, (HUMALOG KWIKPEN) 100 UNIT/ML SOPN    Insulin Pen Needle 31G X 8 MM MISC   2. Dyslipidemia     3. Seborrheic dermatitis  triamcinolone (KENALOG) 0.1 % ointment     We will add mealtime insulin to 6 units prior to each meal.  He will keep track of daily fasting blood sugars and 2 hours postprandial largest meal.  We will adjust accordingly. He will report these numbers weekly. Kenalog cream to the affected areas 2 times daily for 1 week. Call with results. Continue all other medications. We will see him back in 3 months with A1c check in the office, sooner if any issues. 1.  Ananda Diogenes received counseling on the following healthy behaviors: nutrition, exercise and medication adherence  2. Patient given educational materials - see patient instructions  3. Was a self-tracking handout given in paper form or via [employer]? No  If yes, see orders or list here. 4.  Discussed use, benefit, and side effects of prescribed medications. Barriers to medication compliance addressed. All patient questions answered. Pt voiced understanding. 5.  Reviewed prior labs and health maintenance  6. Continue current medications, diet and exercise.     Completed Refills   Requested Prescriptions     Signed Prescriptions Disp Refills    SITagliptin (JANUVIA) 50 MG tablet 90 tablet 3     Sig: TAKE (1) TABLET BY MOUTH DAILY    lisinopril (PRINIVIL;ZESTRIL) 5 MG tablet 90 tablet 3     Sig: TAKE 1 TABLET BY MOUTH ONCE DAILY    Probiotic Product (CULTURELLE PROBIOTICS) CHEW 90 tablet 3     Sig: Take 1 each by mouth daily    Insulin Degludec (TRESIBA FLEXTOUCH) 200 UNIT/ML SOPN 9 mL 5     Sig: INJECT 70 UNITS SUBCUTANEOUSLY DAILY  insulin lispro, 1 Unit Dial, (HUMALOG KWIKPEN) 100 UNIT/ML SOPN 5 pen 5     Sig: Inject 6 Units into the skin 3 times daily (before meals)    Insulin Pen Needle 31G X 8 MM MISC 450 each 3     Si each by Does not apply route 5 times daily Diabetes type 2, uncontrolled E11.41, E11.65, Z79.4    triamcinolone (KENALOG) 0.1 % ointment 30 g 1     Sig: Apply topically 2 times daily for 7 days         Return in about 3 months (around 2022) for Check up- A1c in office.

## 2022-01-14 NOTE — TELEPHONE ENCOUNTER
Lilian Giordano stopped back in office to let you know he has been drinking lots of orange juice and eating lots of fruit. That may be the cause of his high A1C. Wanted you to know.

## 2022-01-20 ENCOUNTER — CARE COORDINATION (OUTPATIENT)
Dept: CARE COORDINATION | Age: 67
End: 2022-01-20

## 2022-01-20 NOTE — CARE COORDINATION
Ambulatory Care Coordination Note  1/20/2022  CM Risk Score: 6  Charlson 10 Year Mortality Risk Score: 100%     ACC: Aundrea Bush, RN    Summary Note: Attempted to enroll to patient today for care coordination per PCP referral. Tracey Mora to reach Armand Hammans today. Left a voicemail message with reason for call. Writer requesting a return phone call back when patient is able to [phone], office hours given. Future Appointments   Date Time Provider Penelope Pham   4/14/2022  9:00 AM Carolann Fisher, APRN - CNP [facility]       Care Coordination Plan of Care: This nurse Care Coordinator will  - await call back from patient, and if no return call; will attempt to reach patient back again in 1 week per protocol.

## 2022-01-27 ENCOUNTER — CARE COORDINATION (OUTPATIENT)
Dept: CARE COORDINATION | Age: 67
End: 2022-01-27

## 2022-01-27 NOTE — CARE COORDINATION
Ambulatory Care Coordination Note  1/27/2022  CM Risk Score: 6  Charlson 10 Year Mortality Risk Score: 100%     ACC: Britt Smith, RN    Summary Note: Attempted to reach patient today to enroll into care coordination per PCP referral. Ashli Wadsworth to reach Hailey Bakerome today. Left a voicemail message with reason for call. Writer requesting a return phone call back when patient is able to [phone], office hours given. -2nd unsuccessful outreach attempt       Future Appointments   Date Time Provider Penelope Pham   4/14/2022  9:00 AM Joi Fisher, APRN - CNP [facility]       Care Coordination Plan of Care: This nurse Care Coordinator will  - await call back from patient, and if no return call; will attempt to reach patient back again in 1 week per protocol for final attempt.

## 2022-02-02 ENCOUNTER — CARE COORDINATION (OUTPATIENT)
Dept: CARE COORDINATION | Age: 67
End: 2022-02-02

## 2022-02-02 NOTE — CARE COORDINATION
Ambulatory Care Coordination Note  2/2/2022  CM Risk Score: 6  Charlson 10 Year Mortality Risk Score: 100%     ACC: Dotty Phillips, RN    Summary Note: Attempted to reach patient today to enroll into care coordination per PCP referral. Arabella Bazzi to reach Fredrickurt today. Left a voicemail message with reason for call. Writer requesting a return phone call back when patient is able to [phone], office hours given.     -3rd unsuccessful outreach attempt       Future Appointments   Date Time Provider Penelope Pham   4/14/2022  9:00 AM Julio César Fisher, APRN - CNP [facility]       Care Coordination Plan of Care: This nurse Care Coordinator will  - await call back from patient, and if no return call; will remove name from care team and laila care coordination exclusion.

## 2022-04-21 RX ORDER — LIRAGLUTIDE 6 MG/ML
INJECTION SUBCUTANEOUS
Qty: 9 ML | Refills: 10 | Status: SHIPPED | OUTPATIENT
Start: 2022-04-21 | End: 2022-05-04 | Stop reason: SDUPTHER

## 2022-04-21 NOTE — TELEPHONE ENCOUNTER
Health Maintenance   Topic Date Due    COVID-19 Vaccine (1) Never done    Pneumococcal 0-64 years Vaccine (2 - PCV) 04/09/2019    A1C test (Diabetic or Prediabetic)  04/14/2022    Lipids  05/18/2022    Diabetic retinal exam  04/29/2022 (Originally 12/13/2017)    Diabetic foot exam  01/14/2023 (Originally 11/16/2021)    Flu vaccine (Season Ended) 01/14/2023 (Originally 9/1/2022)    Shingles Vaccine (1 of 2) 01/14/2023 (Originally 11/15/2007)    Potassium  10/14/2022    Creatinine  10/14/2022    Depression Screen  01/14/2023    Colorectal Cancer Screen  09/14/2023    DTaP/Tdap/Td vaccine (3 - Td or Tdap) 04/12/2031    Hepatitis C screen  Completed    HIV screen  Completed    Hepatitis A vaccine  Aged Out    Hib vaccine  Aged Out    Meningococcal (ACWY) vaccine  Aged Out             (applicable per patient's age: Cancer Screenings, Depression Screening, Fall Risk Screening, Immunizations)    Hemoglobin A1C (%)   Date Value   01/14/2022 11.3   10/14/2021 9.3   05/18/2021 10.8 (H)     Microalb/Crt.  Ratio (mcg/mg creat)   Date Value   05/18/2021 15     LDL Cholesterol (mg/dL)   Date Value   05/18/2021 44     AST (U/L)   Date Value   05/18/2021 14     ALT (U/L)   Date Value   05/18/2021 16     BUN (mg/dL)   Date Value   10/14/2021 18      (goal A1C is < 7)   (goal LDL is <100) need 30-50% reduction from baseline     BP Readings from Last 3 Encounters:   03/15/22 (!) 166/109   01/14/22 136/82   10/14/21 (!) 142/94    (goal /80)      All Future Testing planned in [facility]:  Lab Frequency Next Occurrence       Next Visit Date:  Future Appointments   Date Time Provider Penelope Pham   5/4/2022  2:20 PM Xochilt Backer JOSSELIN FisherN - CNP [facility]            Patient Active Problem List:     Uncontrolled type 2 diabetes mellitus with chronic kidney disease, with long-term current use of insulin ([address]     Dyslipidemia     Gout     Essential hypertension     CKD (chronic kidney disease) stage 3, GFR 30-59 ml/min (HCC)     BLANE (acute kidney injury) ([facility]     Diabetic polyneuropathy associated with type 2 diabetes mellitus (HCC)     Fatty liver     Normocytic normochromic anemia     Positive FIT (fecal immunochemical test)

## 2022-05-04 ENCOUNTER — OFFICE VISIT (OUTPATIENT)
Dept: PRIMARY CARE CLINIC | Age: 67
End: 2022-05-04
Payer: MEDICARE

## 2022-05-04 VITALS
BODY MASS INDEX: 30.07 KG/M2 | WEIGHT: 192 LBS | OXYGEN SATURATION: 97 % | DIASTOLIC BLOOD PRESSURE: 84 MMHG | RESPIRATION RATE: 18 BRPM | HEART RATE: 86 BPM | SYSTOLIC BLOOD PRESSURE: 136 MMHG | TEMPERATURE: 99 F

## 2022-05-04 DIAGNOSIS — E78.5 DYSLIPIDEMIA: ICD-10-CM

## 2022-05-04 LAB — HBA1C MFR BLD: 10.2 %

## 2022-05-04 PROCEDURE — G8427 DOCREV CUR MEDS BY ELIG CLIN: HCPCS | Performed by: NURSE PRACTITIONER

## 2022-05-04 PROCEDURE — 3017F COLORECTAL CA SCREEN DOC REV: CPT | Performed by: NURSE PRACTITIONER

## 2022-05-04 PROCEDURE — 83036 HEMOGLOBIN GLYCOSYLATED A1C: CPT | Performed by: NURSE PRACTITIONER

## 2022-05-04 PROCEDURE — G8417 CALC BMI ABV UP PARAM F/U: HCPCS | Performed by: NURSE PRACTITIONER

## 2022-05-04 PROCEDURE — 3046F HEMOGLOBIN A1C LEVEL >9.0%: CPT | Performed by: NURSE PRACTITIONER

## 2022-05-04 PROCEDURE — 4004F PT TOBACCO SCREEN RCVD TLK: CPT | Performed by: NURSE PRACTITIONER

## 2022-05-04 PROCEDURE — 2022F DILAT RTA XM EVC RTNOPTHY: CPT | Performed by: NURSE PRACTITIONER

## 2022-05-04 PROCEDURE — 99214 OFFICE O/P EST MOD 30 MIN: CPT | Performed by: NURSE PRACTITIONER

## 2022-05-04 RX ORDER — LIRAGLUTIDE 6 MG/ML
INJECTION SUBCUTANEOUS
Qty: 9 ML | Refills: 10 | Status: SHIPPED | OUTPATIENT
Start: 2022-05-04

## 2022-05-04 RX ORDER — INSULIN LISPRO 100 [IU]/ML
6 INJECTION, SOLUTION INTRAVENOUS; SUBCUTANEOUS
Qty: 5 PEN | Refills: 5 | Status: SHIPPED | OUTPATIENT
Start: 2022-05-04

## 2022-05-04 RX ORDER — INSULIN DEGLUDEC 200 U/ML
INJECTION, SOLUTION SUBCUTANEOUS
Qty: 9 ML | Refills: 5 | Status: SHIPPED | OUTPATIENT
Start: 2022-05-04 | End: 2022-10-28

## 2022-05-04 ASSESSMENT — ENCOUNTER SYMPTOMS
WHEEZING: 0
DIARRHEA: 0
NAUSEA: 0
SHORTNESS OF BREATH: 0
ABDOMINAL PAIN: 0
RHINORRHEA: 0
COUGH: 0
SORE THROAT: 0
VOMITING: 0
CONSTIPATION: 0

## 2022-05-04 ASSESSMENT — PATIENT HEALTH QUESTIONNAIRE - PHQ9
2. FEELING DOWN, DEPRESSED OR HOPELESS: 0
1. LITTLE INTEREST OR PLEASURE IN DOING THINGS: 0
SUM OF ALL RESPONSES TO PHQ9 QUESTIONS 1 & 2: 0
SUM OF ALL RESPONSES TO PHQ QUESTIONS 1-9: 0

## 2022-05-04 NOTE — Clinical Note
Restart his insulins and continue to keep track of his blood sugars. I would like him to track his daily fasting and 2 hours post largest meal.  I will ask our coordination to get involved. Continue all other medications. We will see him back in 3 months with routine labs, sooner if any issues.

## 2022-05-04 NOTE — PROGRESS NOTES
Name: Guanaco Marrero  :          Chief Complaint:     Chief Complaint   Patient presents with    Diabetes     c/o left foot being numb with \"pin pricks\"    Hyperlipidemia       History of Present Illness:      Guanaco Marrero is a 61 y.o.  male who presents with Diabetes (c/o left foot being numb with \"pin pricks\") and Hyperlipidemia      Sofiya Garza is here today for a routine office visit. DM-unfortunately Sofiya Garza thought that he was doing much better diabetes but his A1c continues to be greater than 10. He is frustrated. He states he is not eating sweets or any bad things so he is not taking his mealtime insulin. Explained that even though he is not eating bad things he still will need to cover his meals. He is agreeable. Diabetes  He presents for his follow-up diabetic visit. He has type 2 diabetes mellitus. His disease course has been worsening ([place]). There are no hypoglycemic associated symptoms. Pertinent negatives for hypoglycemia include no dizziness or headaches. Associated symptoms include foot paresthesias. Pertinent negatives for diabetes include no chest pain, no fatigue, no polydipsia, no polyphagia and no polyuria. There are no hypoglycemic complications. Symptoms are stable. Diabetic complications include a CVA, nephropathy and peripheral neuropathy. Pertinent negatives for diabetic complications include no heart disease. Risk factors for coronary artery disease include diabetes mellitus, dyslipidemia, hypertension and male sex. Current diabetic treatment includes oral agent (dual therapy) and insulin injections. He is compliant with treatment all of the time. His weight is stable. He is following a generally healthy diet. When asked about meal planning, he reported none. He participates in exercise daily. His home blood glucose trend is increasing steadily. His breakfast blood glucose range is generally >200 mg/dl.  An ACE inhibitor/angiotensin II receptor blocker is being taken. He does not see a podiatrist.Eye exam is not current. Hyperlipidemia  This is a chronic problem. The current episode started more than 1 year ago. The problem is controlled. Recent lipid tests were reviewed and are normal. Exacerbating diseases include chronic renal disease and diabetes. He has no history of hypothyroidism, liver disease, obesity or nephrotic syndrome. Factors aggravating his hyperlipidemia include fatty foods. Pertinent negatives include no chest pain, leg pain, myalgias or shortness of breath. Current antihyperlipidemic treatment includes statins. The current treatment provides moderate improvement of lipids. Compliance problems include adherence to exercise and adherence to diet. Risk factors for coronary artery disease include diabetes mellitus, dyslipidemia, family history, obesity, male sex, stress and a sedentary lifestyle.          Past Medical History:     Past Medical History:   Diagnosis Date    Acute [facility] ([facility] [address]     Chronic kidney disease     Diabetic neuropathy ([facility] [address]     Gout     Hyperlipidemia     Hypertension     MRSA infection     Osteoarthritis     Stroke ([facility] [address] 2004    s/p cva - loss of half of vision in left eye    Type II diabetes mellitus ([facility] [address] 1999      Reviewed all health maintenance requirements and ordered appropriate tests  Health Maintenance Due   Topic Date Due    COVID-19 Vaccine (1) Never done    Diabetic retinal exam  12/13/2017    Pneumococcal 0-64 years Vaccine (2 - PCV) 04/09/2019    Lipids  05/18/2022       Past Surgical History:     Past Surgical History:   Procedure Laterality Date    ABSCESS DRAINAGE  10/4/2015    perianal    APPENDECTOMY      COLONOSCOPY  09/14/2020    Dr. Mono Sargent serrated adenoma)diverticulosis    COLONOSCOPY N/A 9/14/2020    COLONOSCOPY POLYPECTOMY SNARE/COLD BIOPSY   hot snare and cold snare performed by Vicki Nur MD at [address]  3/19/13 Abdomen        Medications:       Prior to Admission medications    Medication Sig Start Date End Date Taking? Authorizing Provider   Insulin Degludec (TRESIBA FLEXTOUCH) 200 UNIT/ML SOPN INJECT 74 UNITS SUBCUTANEOUSLY DAILY 5/4/22  Yes RAY Botello CNP   insulin lispro, 1 Unit Dial, (HUMALOG KWIKPEN) 100 UNIT/ML SOPN Inject 6 Units into the skin 3 times daily (before meals) 5/4/22  Yes RAY Botello - CNP   Liraglutide (VICTOZA) 18 MG/3ML SOPN SC injection INJECT 1.8MG SUBCUTANEOUSLY DAILY *DOSE INCREASE* 5/4/22  Yes RAY Botello - CNP   SITagliptin (JANUVIA) 50 MG tablet TAKE (1) TABLET BY MOUTH DAILY 1/14/22  Yes RAY Botello - CNP   lisinopril (PRINIVIL;ZESTRIL) 5 MG tablet TAKE 1 TABLET BY MOUTH ONCE DAILY 1/14/22  Yes RAY Botello - CNP   Insulin Pen Needle 31G X 8 MM MISC 1 each by Does not apply route 5 times daily Diabetes type 2, uncontrolled E11.41, E11.65, Z79.4 1/14/22  Yes RAY Botello - CNP   montelukast (SINGULAIR) 10 MG tablet TAKE 1 TABLET BY MOUTH ONCE DAILY *EMERGENCY REFILL* 11/18/21  Yes RAY Botello - CNP   ASPIRIN LOW DOSE 81 MG EC tablet TAKE 1 TABLET BY MOUTH EVERY DAY 11/18/21  Yes RAY Botello - CNP   blood glucose test strips (FREESTYLE LITE) strip 1 each by In Vitro route 3 times daily As needed.  10/14/21  Yes Liz Fisher APRN - CNP   FreeStyle Lancets MISC 1 each by Does not apply route 3 times daily 10/14/21  Yes RAY Botello - CNP   DULoxetine (CYMBALTA) 30 MG extended release capsule TAKE (1) CAPSULE BY MOUTH ONCE DAILY 10/14/21  Yes Liz Fisher APRN - CNP   atorvastatin (LIPITOR) 40 MG tablet Take 1 tablet by mouth daily 10/14/21  Yes Liz Fisher APRN - CNP   vitamin D (D3-1000) 25 MCG (1000 UT) CAPS TAKE 1 CAPSULE BY MOUTH DAILY 10/14/21  Yes RAY Botello CNP   glucose monitoring kit (FREESTYLE) monitoring kit 1 kit by Does not apply route daily 11/14/18  Yes RAY Botello CNP   Alcohol Swabs (EASY TOUCH ALCOHOL PREP MEDIUM) 70 % PADS USE TO TEST TWICE DAILY 5/15/17  Yes Historical Provider, MD   Probiotic Product ([place] Doing) CHEW Take 1 each by mouth daily  Patient not taking: Reported on 5/4/2022 1/14/22   RAY Lombardo CNP        Allergies:       Patient has no known allergies. Social History:     Tobacco:    reports that he has been smoking cigars. He has been smoking about 0.00 packs per day for the past 25.00 years. He has never used smokeless tobacco.  Alcohol:      reports no history of alcohol use. Drug Use:  reports no history of drug use. Family History:     Family History   Problem Relation Age of Onset    Diabetes Mother     Heart Disease Father     Diabetes Brother     Diabetes Brother     Stroke Other     Diabetes Other     Arthritis Other        Review of Systems:     Positive and Negative as described in HPI    Review of Systems   Constitutional: Negative for chills, fatigue and fever. HENT: Negative for congestion, rhinorrhea and sore throat. Eyes: Negative for visual disturbance. Respiratory: Negative for cough, shortness of breath and wheezing. Cardiovascular: Negative for chest pain and palpitations. Gastrointestinal: Negative for abdominal pain, constipation, diarrhea, nausea and vomiting. Endocrine: Negative for polydipsia, polyphagia and polyuria. Genitourinary: Negative for difficulty urinating and dysuria. Musculoskeletal: Negative for gait problem, myalgias, neck pain and neck stiffness. Skin: Negative for rash. Neurological: Positive for numbness. Negative for dizziness, syncope, light-headedness and headaches. Physical Exam:   Vitals:  /84 (Site: Left Upper Arm, Position: Sitting, Cuff Size: Large Adult)   Pulse 86   Temp 99 °F (37.2 °C) (Temporal)   Resp 18   Wt 192 lb (87.1 kg)   SpO2 97%   BMI 30.07 kg/m²     Physical Exam  Vitals and nursing note reviewed.    Constitutional:       General: He is not in acute distress. Appearance: Normal appearance. He is well-developed. He is not ill-appearing. HENT:      Mouth/Throat:      Mouth: Mucous membranes are moist.      Pharynx: No posterior oropharyngeal erythema. Eyes:      General: No scleral icterus. Conjunctiva/sclera: Conjunctivae normal.   Cardiovascular:      Rate and Rhythm: Normal rate and regular rhythm. Heart sounds: No murmur heard. Pulmonary:      Effort: Pulmonary effort is normal.      Breath sounds: Normal breath sounds. No wheezing. Abdominal:      General: Bowel sounds are normal. There is no distension. Palpations: Abdomen is soft. Tenderness: There is no abdominal tenderness. Musculoskeletal:      Cervical back: Normal range of motion and neck supple. Right lower leg: No edema. Left lower leg: No edema. Lymphadenopathy:      Cervical: No cervical adenopathy. Skin:     General: Skin is warm and dry. Findings: No rash. Neurological:      Mental Status: He is alert and oriented to person, place, and time. Psychiatric:         Mood and Affect: Mood normal. Mood is not anxious or depressed.          Behavior: Behavior normal.         Data:     Lab Results   Component Value Date     10/14/2021    K 4.5 10/14/2021     10/14/2021    CO2 25 10/14/2021    BUN 18 10/14/2021    CREATININE 1.15 10/14/2021    GLUCOSE 307 10/14/2021    GLUCOSE 200 03/13/2012    PROT 6.9 08/13/2019    LABALBU 3.9 08/13/2019    LABALBU 4.2 03/13/2012    BILITOT 0.29 08/13/2019    ALKPHOS 89 08/13/2019    AST 14 05/18/2021    ALT 16 05/18/2021     Lab Results   Component Value Date    WBC 8.7 05/18/2021    RBC 5.16 05/18/2021    RBC 5.26 02/22/2012    HGB 15.3 05/18/2021    HCT 46.9 05/18/2021    MCV 90.9 05/18/2021    MCH 29.7 05/18/2021    MCHC 32.6 05/18/2021    RDW 12.5 05/18/2021     05/18/2021     02/22/2012    MPV 10.3 05/18/2021     Lab Results   Component Value Date    TSH 2.92 12/11/2016 Lab Results   Component Value Date    CHOL 137 05/18/2021    HDL 24 05/18/2021    PSA 2.48 02/22/2012    LABA1C 10.2 05/04/2022       Assessment/Plan:      Diagnosis Orders   1. Uncontrolled type 2 diabetes mellitus with chronic kidney disease, with long-term current use of insulin ([facility])  POCT glycosylated hemoglobin (Hb A1C)    Insulin Degludec (TRESIBA FLEXTOUCH) 200 UNIT/ML SOPN    insulin lispro, 1 Unit Dial, (HUMALOG KWIKPEN) 100 UNIT/ML SOPN    Liraglutide (VICTOZA) 18 MG/3ML SOPN SC injection    CBC with Auto Differential    ALT    AST    Basic Metabolic Panel    Hemoglobin A1C    Lipid Panel    Microalbumin, Ur   2. Dyslipidemia       Restart his insulins and continue to keep track of his blood sugars. I would like him to track his daily fasting and 2 hours post largest meal.  I will ask our coordination to get involved. Continue all other medications. We will see him back in 3 months with routine labs, sooner if any issues. 1.  Alan Burk received counseling on the following healthy behaviors: nutrition, exercise and medication adherence  2. Patient given educational materials - see patient instructions  3. Was a self-tracking handout given in paper form or via [employer]? No  If yes, see orders or list here. 4.  Discussed use, benefit, and side effects of prescribed medications. Barriers to medication compliance addressed. All patient questions answered. Pt voiced understanding. 5.  Reviewed prior labs and health maintenance  6. Continue current medications, diet and exercise.     Completed Refills   Requested Prescriptions     Signed Prescriptions Disp Refills    Insulin Degludec (TRESIBA FLEXTOUCH) 200 UNIT/ML SOPN 9 mL 5     Sig: INJECT 74 UNITS SUBCUTANEOUSLY DAILY    insulin lispro, 1 Unit Dial, (HUMALOG KWIKPEN) 100 UNIT/ML SOPN 5 pen 5     Sig: Inject 6 Units into the skin 3 times daily (before meals)    Liraglutide (VICTOZA) 18 MG/3ML SOPN SC injection 9 mL 10     Sig: INJECT 1.8MG SUBCUTANEOUSLY DAILY *DOSE INCREASE*         Return in about 3 months (around 8/4/2022) for Check up.

## 2022-05-11 ENCOUNTER — CARE COORDINATION (OUTPATIENT)
Dept: CARE COORDINATION | Age: 67
End: 2022-05-11

## 2022-05-11 NOTE — CARE COORDINATION
Ambulatory Care Coordination Note  5/11/2022  CM Risk Score: 6  Charlson 10 Year Mortality Risk Score: 100%     ACC: Leandra Mary, RN    Summary Note: Attempted to enroll to patient today for care coordination per PCP referral. Radha Joseph to reach Estu[facility] Hernandez today. Call went straight to voicemail. Left a voicemail message with reason for call. Writer requesting a return phone call back when patient is able to [phone], office hours given. Future Appointments   Date Time Provider Penelope Pham   8/4/2022  2:20 PM Martha Fisher, APRN - CNP [facility]       Care Coordination Plan of Care: This nurse Care Coordinator will  - await call back from patient, and if no return call; will attempt to reach patient back again in 1 week per protocol.

## 2022-05-17 ENCOUNTER — CARE COORDINATION (OUTPATIENT)
Dept: CARE COORDINATION | Age: 67
End: 2022-05-17

## 2022-05-17 NOTE — CARE COORDINATION
Ambulatory Care Coordination Note  5/17/2022  CM Risk Score: 6  Charlson 10 Year Mortality Risk Score: 100%     ACC: Juliette Harkins, RN    Summary Note: Attempted to enroll to patient today for care coordination per PCP referral. Henny Miller to reach [facility] today. Call went straight to voicemail. Left a voicemail message with reason for call. Writer requesting a return phone call back when patient is able to [phone], office hours given. Future Appointments   Date Time Provider Penelope Pham   8/4/2022  2:20 PM Helen Hammans Might, APRN - CNP [facility]       Care Coordination Plan of Care:    This nurse Care Coordinator will  - await call back from patient, and if no return call; will laila exclusion and remove name from care team.

## 2022-08-04 ENCOUNTER — TELEPHONE (OUTPATIENT)
Dept: PRIMARY CARE CLINIC | Age: 67
End: 2022-08-04

## 2022-08-10 ENCOUNTER — HOSPITAL ENCOUNTER (OUTPATIENT)
Age: 67
Discharge: HOME | End: 2022-08-10
Payer: MEDICARE

## 2022-08-10 LAB
ABSOLUTE EOS #: 0.29 K/UL (ref 0–0.44)
ABSOLUTE IMMATURE GRANULOCYTE: 0.05 K/UL (ref 0–0.3)
ABSOLUTE LYMPH #: 2.34 K/UL (ref 1.1–3.7)
ABSOLUTE MONO #: 0.68 K/UL (ref 0.1–1.2)
ALT SERPL-CCNC: 13 U/L (ref 5–41)
ANION GAP SERPL CALCULATED.3IONS-SCNC: 10 MMOL/L (ref 9–17)
AST SERPL-CCNC: 13 U/L
BASOPHILS # BLD: 1 % (ref 0–2)
BASOPHILS ABSOLUTE: 0.06 K/UL (ref 0–0.2)
BUN BLDV-MCNC: 19 MG/DL (ref 8–23)
BUN/CREAT BLD: 17 (ref 9–20)
CALCIUM SERPL-MCNC: 9.7 MG/DL (ref 8.6–10.4)
CHLORIDE BLD-SCNC: 102 MMOL/L (ref 98–107)
CHOLESTEROL/HDL RATIO: 4.6
CHOLESTEROL: 116 MG/DL
CO2: 27 MMOL/L (ref 20–31)
CREAT SERPL-MCNC: 1.12 MG/DL (ref 0.7–1.2)
CREATININE URINE: 70.4 MG/DL (ref 39–259)
EOSINOPHILS RELATIVE PERCENT: 2 % (ref 1–4)
ESTIMATED AVERAGE GLUCOSE: 220 MG/DL
GFR AFRICAN AMERICAN: >60 ML/MIN
GFR NON-AFRICAN AMERICAN: >60 ML/MIN
GFR SERPL CREATININE-BSD FRML MDRD: ABNORMAL ML/MIN/{1.73_M2}
GFR SERPL CREATININE-BSD FRML MDRD: ABNORMAL ML/MIN/{1.73_M2}
GLUCOSE BLD-MCNC: 130 MG/DL (ref 70–99)
HBA1C MFR BLD: 9.3 % (ref 4–6)
HCT VFR BLD CALC: 47.3 % (ref 40.7–50.3)
HDLC SERPL-MCNC: 25 MG/DL
HEMOGLOBIN: 14.8 G/DL (ref 13–17)
IMMATURE GRANULOCYTES: 0 %
LDL CHOLESTEROL: 65 MG/DL (ref 0–130)
LYMPHOCYTES # BLD: 19 % (ref 24–43)
MCH RBC QN AUTO: 29 PG (ref 25.2–33.5)
MCHC RBC AUTO-ENTMCNC: 31.3 G/DL (ref 28.4–34.8)
MCV RBC AUTO: 92.7 FL (ref 82.6–102.9)
MICROALBUMIN/CREAT 24H UR: 15 MG/L
MICROALBUMIN/CREAT UR-RTO: 21 MCG/MG CREAT
MONOCYTES # BLD: 6 % (ref 3–12)
NRBC AUTOMATED: 0 PER 100 WBC
PDW BLD-RTO: 12.6 % (ref 11.8–14.4)
PLATELET # BLD: 282 K/UL (ref 138–453)
PMV BLD AUTO: 10.7 FL (ref 8.1–13.5)
POTASSIUM SERPL-SCNC: 4.7 MMOL/L (ref 3.7–5.3)
RBC # BLD: 5.1 M/UL (ref 4.21–5.77)
SEG NEUTROPHILS: 72 % (ref 36–65)
SEGMENTED NEUTROPHILS ABSOLUTE COUNT: 8.77 K/UL (ref 1.5–8.1)
SODIUM BLD-SCNC: 139 MMOL/L (ref 135–144)
TRIGL SERPL-MCNC: 131 MG/DL
WBC # BLD: 12.2 K/UL (ref 3.5–11.3)

## 2022-08-10 PROCEDURE — 84450 TRANSFERASE (AST) (SGOT): CPT

## 2022-08-10 PROCEDURE — 82043 UR ALBUMIN QUANTITATIVE: CPT

## 2022-08-10 PROCEDURE — 82570 ASSAY OF URINE CREATININE: CPT

## 2022-08-10 PROCEDURE — 83036 HEMOGLOBIN GLYCOSYLATED A1C: CPT

## 2022-08-10 PROCEDURE — 85025 COMPLETE CBC W/AUTO DIFF WBC: CPT

## 2022-08-10 PROCEDURE — 84460 ALANINE AMINO (ALT) (SGPT): CPT

## 2022-08-10 PROCEDURE — 80048 BASIC METABOLIC PNL TOTAL CA: CPT

## 2022-08-10 PROCEDURE — 80061 LIPID PANEL: CPT

## 2022-08-10 PROCEDURE — 36415 COLL VENOUS BLD VENIPUNCTURE: CPT

## 2022-08-11 ENCOUNTER — TELEPHONE (OUTPATIENT)
Dept: PRIMARY CARE CLINIC | Age: 67
End: 2022-08-11

## 2022-08-11 NOTE — TELEPHONE ENCOUNTER
----- Message from RAY Campa CNP sent at 8/11/2022  8:26 AM EDT -----  A1c is a little improved. Needs appointment. Thank you.

## 2022-09-13 ENCOUNTER — OFFICE VISIT (OUTPATIENT)
Dept: PRIMARY CARE CLINIC | Age: 67
End: 2022-09-13
Payer: MEDICARE

## 2022-09-13 VITALS
RESPIRATION RATE: 20 BRPM | HEART RATE: 84 BPM | DIASTOLIC BLOOD PRESSURE: 78 MMHG | OXYGEN SATURATION: 98 % | BODY MASS INDEX: 29.6 KG/M2 | HEIGHT: 67 IN | WEIGHT: 188.6 LBS | SYSTOLIC BLOOD PRESSURE: 128 MMHG | TEMPERATURE: 97.7 F

## 2022-09-13 DIAGNOSIS — I10 PRIMARY HYPERTENSION: ICD-10-CM

## 2022-09-13 DIAGNOSIS — E78.5 DYSLIPIDEMIA: ICD-10-CM

## 2022-09-13 DIAGNOSIS — E11.22 CONTROLLED TYPE 2 DIABETES MELLITUS WITH CHRONIC KIDNEY DISEASE ON CHRONIC DIALYSIS, WITH LONG-TERM CURRENT USE OF INSULIN (HCC): Primary | ICD-10-CM

## 2022-09-13 DIAGNOSIS — Z79.4 CONTROLLED TYPE 2 DIABETES MELLITUS WITH CHRONIC KIDNEY DISEASE ON CHRONIC DIALYSIS, WITH LONG-TERM CURRENT USE OF INSULIN (HCC): Primary | ICD-10-CM

## 2022-09-13 DIAGNOSIS — N18.6 CONTROLLED TYPE 2 DIABETES MELLITUS WITH CHRONIC KIDNEY DISEASE ON CHRONIC DIALYSIS, WITH LONG-TERM CURRENT USE OF INSULIN (HCC): Primary | ICD-10-CM

## 2022-09-13 DIAGNOSIS — Z12.5 SCREENING PSA (PROSTATE SPECIFIC ANTIGEN): ICD-10-CM

## 2022-09-13 DIAGNOSIS — Z99.2 CONTROLLED TYPE 2 DIABETES MELLITUS WITH CHRONIC KIDNEY DISEASE ON CHRONIC DIALYSIS, WITH LONG-TERM CURRENT USE OF INSULIN (HCC): Primary | ICD-10-CM

## 2022-09-13 LAB — HBA1C MFR BLD: 8.4 %

## 2022-09-13 PROCEDURE — G8417 CALC BMI ABV UP PARAM F/U: HCPCS | Performed by: NURSE PRACTITIONER

## 2022-09-13 PROCEDURE — 99214 OFFICE O/P EST MOD 30 MIN: CPT | Performed by: NURSE PRACTITIONER

## 2022-09-13 PROCEDURE — 3017F COLORECTAL CA SCREEN DOC REV: CPT | Performed by: NURSE PRACTITIONER

## 2022-09-13 PROCEDURE — G8427 DOCREV CUR MEDS BY ELIG CLIN: HCPCS | Performed by: NURSE PRACTITIONER

## 2022-09-13 PROCEDURE — 83036 HEMOGLOBIN GLYCOSYLATED A1C: CPT | Performed by: NURSE PRACTITIONER

## 2022-09-13 PROCEDURE — 2022F DILAT RTA XM EVC RTNOPTHY: CPT | Performed by: NURSE PRACTITIONER

## 2022-09-13 PROCEDURE — 3052F HG A1C>EQUAL 8.0%<EQUAL 9.0%: CPT | Performed by: NURSE PRACTITIONER

## 2022-09-13 PROCEDURE — 4004F PT TOBACCO SCREEN RCVD TLK: CPT | Performed by: NURSE PRACTITIONER

## 2022-09-13 SDOH — ECONOMIC STABILITY: FOOD INSECURITY: WITHIN THE PAST 12 MONTHS, YOU WORRIED THAT YOUR FOOD WOULD RUN OUT BEFORE YOU GOT MONEY TO BUY MORE.: PATIENT DECLINED

## 2022-09-13 SDOH — ECONOMIC STABILITY: FOOD INSECURITY: WITHIN THE PAST 12 MONTHS, THE FOOD YOU BOUGHT JUST DIDN'T LAST AND YOU DIDN'T HAVE MONEY TO GET MORE.: PATIENT DECLINED

## 2022-09-13 ASSESSMENT — ENCOUNTER SYMPTOMS
SORE THROAT: 0
CONSTIPATION: 0
SHORTNESS OF BREATH: 0
NAUSEA: 0
ABDOMINAL PAIN: 0
RHINORRHEA: 0
WHEEZING: 0
COUGH: 0
VOMITING: 0
BLURRED VISION: 0
DIARRHEA: 0

## 2022-09-13 ASSESSMENT — PATIENT HEALTH QUESTIONNAIRE - PHQ9
SUM OF ALL RESPONSES TO PHQ QUESTIONS 1-9: 0
SUM OF ALL RESPONSES TO PHQ9 QUESTIONS 1 & 2: 0
SUM OF ALL RESPONSES TO PHQ QUESTIONS 1-9: 0
2. FEELING DOWN, DEPRESSED OR HOPELESS: 0
SUM OF ALL RESPONSES TO PHQ QUESTIONS 1-9: 0
SUM OF ALL RESPONSES TO PHQ QUESTIONS 1-9: 0
1. LITTLE INTEREST OR PLEASURE IN DOING THINGS: 0

## 2022-09-13 ASSESSMENT — SOCIAL DETERMINANTS OF HEALTH (SDOH): HOW HARD IS IT FOR YOU TO PAY FOR THE VERY BASICS LIKE FOOD, HOUSING, MEDICAL CARE, AND HEATING?: PATIENT DECLINED

## 2022-09-13 NOTE — PROGRESS NOTES
Name: Issac Figueroa  :          Chief Complaint:     Chief Complaint   Patient presents with    Diabetes     Routine check. Hypertension       History of Present Illness:      Issac Figueroa is a 61 y.o.  male who presents with Diabetes (Routine check. ) and Hypertension      Herber Taveras is here today for a routine office visit. DM-improving patient is congratulated for his efforts. He is compliant with his medication. See below for further comment. Diabetes  He presents for his follow-up diabetic visit. He has type 2 diabetes mellitus. His disease course has been improving. There are no hypoglycemic associated symptoms. Pertinent negatives for hypoglycemia include no dizziness or headaches. Associated symptoms include foot paresthesias. Pertinent negatives for diabetes include no blurred vision, no chest pain, no fatigue, no polydipsia, no polyphagia and no polyuria. There are no hypoglycemic complications. Symptoms are stable. Diabetic complications include a CVA, nephropathy and peripheral neuropathy. Pertinent negatives for diabetic complications include no heart disease. Risk factors for coronary artery disease include diabetes mellitus, dyslipidemia, hypertension and male sex. Current diabetic treatment includes oral agent (dual therapy) and insulin injections. He is compliant with treatment all of the time. His weight is stable. He is following a generally healthy diet. When asked about meal planning, he reported none. He participates in exercise daily. His home blood glucose trend is decreasing steadily. His breakfast blood glucose range is generally 180-200 mg/dl. An ACE inhibitor/angiotensin II receptor blocker is being taken. He does not see a podiatrist.Eye exam is not current. Hypertension  This is a chronic problem. The current episode started more than 1 year ago. The problem is unchanged. The problem is controlled.  Pertinent negatives include no anxiety, blurred vision, chest pain, headaches, neck pain, palpitations, peripheral edema or shortness of breath. There are no associated agents to hypertension. Risk factors for coronary artery disease include diabetes mellitus, dyslipidemia, family history, male gender, stress and sedentary lifestyle. Past treatments include ACE inhibitors. The current treatment provides moderate improvement. There are no compliance problems. Hypertensive end-organ damage includes kidney disease and CVA. There is no history of CAD/MI or heart failure. Identifiable causes of hypertension include chronic renal disease. Hyperlipidemia  This is a chronic problem. The current episode started more than 1 year ago. The problem is controlled. Recent lipid tests were reviewed and are normal. Exacerbating diseases include chronic renal disease and diabetes. He has no history of hypothyroidism, liver disease, obesity or nephrotic syndrome. Factors aggravating his hyperlipidemia include fatty foods. Pertinent negatives include no chest pain, leg pain, myalgias or shortness of breath. Current antihyperlipidemic treatment includes statins. The current treatment provides moderate improvement of lipids. Compliance problems include adherence to exercise and adherence to diet. Risk factors for coronary artery disease include diabetes mellitus, dyslipidemia, family history, obesity, male sex, stress and a sedentary lifestyle. Past Medical History:     Past Medical History:   Diagnosis Date    Acute kidney injury ([address]     Chronic kidney disease     Diabetic neuropathy ([address]     Gout     Hyperlipidemia     Hypertension     MRSA infection     Osteoarthritis     Stroke ([address] 2004    s/p cva - loss of half of vision in left eye    Type II diabetes mellitus ([address] 1999      Reviewed all health maintenance requirements and ordered appropriate tests  There are no preventive care reminders to display for this patient.     Past Surgical History:     Past Surgical History: Procedure Laterality Date    ABSCESS DRAINAGE  10/4/2015    perianal    APPENDECTOMY      COLONOSCOPY  09/14/2020    Dr. Larissa Oh serrated adenoma)diverticulosis    COLONOSCOPY N/A 9/14/2020    COLONOSCOPY POLYPECTOMY SNARE/COLD BIOPSY   hot snare and cold snare performed by Marissa Snyder MD at [address]  3/19/13    Abdomen        Medications:       Prior to Admission medications    Medication Sig Start Date End Date Taking? Authorizing Provider   Insulin Degludec (TRESIBA FLEXTOUCH) 200 UNIT/ML SOPN INJECT 74 UNITS SUBCUTANEOUSLY DAILY 5/4/22  Yes Talia Meier Might, APRN - CNP   insulin lispro, 1 Unit Dial, (HUMALOG KWIKPEN) 100 UNIT/ML SOPN Inject 6 Units into the skin 3 times daily (before meals) 5/4/22  Yes Talia Meier Might, APRN - CNP   Liraglutide (VICTOZA) 18 MG/3ML SOPN SC injection INJECT 1.8MG SUBCUTANEOUSLY DAILY *DOSE INCREASE* 5/4/22  Yes Talia Fisher, APRN - CNP   SITagliptin (JANUVIA) 50 MG tablet TAKE (1) TABLET BY MOUTH DAILY 1/14/22  Yes Talia Fisher, APRN - CNP   lisinopril (PRINIVIL;ZESTRIL) 5 MG tablet TAKE 1 TABLET BY MOUTH ONCE DAILY 1/14/22  Yes Talia Fisher, APRN - CNP   Probiotic Product (CULTURELLE PROBIOTICS) CHEW Take 1 each by mouth daily 1/14/22  Yes Talia Fisher, APRN - CNP   Insulin Pen Needle 31G X 8 MM MISC 1 each by Does not apply route 5 times daily Diabetes type 2, uncontrolled E11.41, E11.65, Z79.4 1/14/22  Yes Talia Fisher, APRN - CNP   montelukast (SINGULAIR) 10 MG tablet TAKE 1 TABLET BY MOUTH ONCE DAILY *EMERGENCY REFILL* 11/18/21  Yes Talia Fisher, APRN - CNP   ASPIRIN LOW DOSE 81 MG EC tablet TAKE 1 TABLET BY MOUTH EVERY DAY 11/18/21  Yes Talia Meier Might, APRN - CNP   blood glucose test strips (FREESTYLE LITE) strip 1 each by In Vitro route 3 times daily As needed.  10/14/21  Yes Talia Fisher, APRN - CNP   FreeStyle Lancets MISC 1 each by Does not apply route 3 times daily 10/14/21  Yes Talia Fisher, APRN - GABRIELLA DULoxetine (CYMBALTA) 30 MG extended release capsule TAKE (1) CAPSULE BY MOUTH ONCE DAILY 10/14/21  Yes RAY You CNP   atorvastatin (LIPITOR) 40 MG tablet Take 1 tablet by mouth daily 10/14/21  Yes RAY You CNP   vitamin D (D3-1000) 25 MCG (1000 UT) CAPS TAKE 1 CAPSULE BY MOUTH DAILY 10/14/21  Yes RAY You CNP   glucose monitoring kit (FREESTYLE) monitoring kit 1 kit by Does not apply route daily 11/14/18  Yes RAY You CNP   Alcohol Swabs (EASY TOUCH ALCOHOL PREP MEDIUM) 70 % PADS USE TO TEST TWICE DAILY 5/15/17  Yes Historical Provider, MD        Allergies:       Patient has no known allergies. Social History:     Tobacco:    reports that he has been smoking cigars and cigarettes. He has never used smokeless tobacco.  Alcohol:      reports no history of alcohol use. Drug Use:  reports no history of drug use. Family History:     Family History   Problem Relation Age of Onset    Diabetes Mother     Heart Disease Father     Diabetes Brother     Diabetes Brother     Stroke Other     Diabetes Other     Arthritis Other        Review of Systems:     Positive and Negative as described in HPI    Review of Systems   Constitutional:  Negative for chills, fatigue and fever. HENT:  Negative for congestion, rhinorrhea and sore throat. Eyes:  Negative for blurred vision and visual disturbance. Respiratory:  Negative for cough, shortness of breath and wheezing. Cardiovascular:  Negative for chest pain and palpitations. Gastrointestinal:  Negative for abdominal pain, constipation, diarrhea, nausea and vomiting. Endocrine: Negative for polydipsia, polyphagia and polyuria. Genitourinary:  Negative for difficulty urinating and dysuria. Musculoskeletal:  Negative for gait problem, myalgias, neck pain and neck stiffness. Skin:  Negative for rash. Neurological:  Positive for numbness. Negative for dizziness, syncope, light-headedness and headaches. Physical Exam:   Vitals:  /78 (Position: Sitting)   Pulse 84   Temp 97.7 °F (36.5 °C) (Temporal)   Resp 20   Ht 5' 7\" (1.702 m)   Wt 188 lb 9.6 oz (85.5 kg)   SpO2 98%   BMI 29.54 kg/m²     Physical Exam  Vitals and nursing note reviewed. Constitutional:       General: He is not in acute distress. Appearance: Normal appearance. He is well-developed. He is not ill-appearing. HENT:      Mouth/Throat:      Mouth: Mucous membranes are moist.      Pharynx: No posterior oropharyngeal erythema. Eyes:      General: No scleral icterus. Conjunctiva/sclera: Conjunctivae normal.   Cardiovascular:      Rate and Rhythm: Normal rate and regular rhythm. Heart sounds: No murmur heard. Pulmonary:      Effort: Pulmonary effort is normal.      Breath sounds: Normal breath sounds. No wheezing. Abdominal:      General: Bowel sounds are normal. There is no distension. Palpations: Abdomen is soft. Tenderness: There is no abdominal tenderness. Musculoskeletal:      Cervical back: Normal range of motion and neck supple. Right lower leg: No edema. Left lower leg: No edema. Lymphadenopathy:      Cervical: No cervical adenopathy. Skin:     General: Skin is warm and dry. Findings: No rash. Neurological:      Mental Status: He is alert and oriented to person, place, and time. Psychiatric:         Mood and Affect: Mood normal. Mood is not anxious or depressed.          Behavior: Behavior normal.       Data:     Lab Results   Component Value Date/Time     08/10/2022 08:10 AM    K 4.7 08/10/2022 08:10 AM     08/10/2022 08:10 AM    CO2 27 08/10/2022 08:10 AM    BUN 19 08/10/2022 08:10 AM    CREATININE 1.12 08/10/2022 08:10 AM    GLUCOSE 130 08/10/2022 08:10 AM    GLUCOSE 200 03/13/2012 03:23 PM    PROT 6.9 08/13/2019 09:11 AM    LABALBU 3.9 08/13/2019 09:11 AM    LABALBU 4.2 03/13/2012 03:23 PM    BILITOT 0.29 08/13/2019 09:11 AM    ALKPHOS 89 08/13/2019 09:11 AM AST 13 08/10/2022 08:10 AM    ALT 13 08/10/2022 08:10 AM     Lab Results   Component Value Date/Time    WBC 12.2 08/10/2022 08:10 AM    RBC 5.10 08/10/2022 08:10 AM    RBC 5.26 02/22/2012 11:31 AM    HGB 14.8 08/10/2022 08:10 AM    HCT 47.3 08/10/2022 08:10 AM    MCV 92.7 08/10/2022 08:10 AM    MCH 29.0 08/10/2022 08:10 AM    MCHC 31.3 08/10/2022 08:10 AM    RDW 12.6 08/10/2022 08:10 AM     08/10/2022 08:10 AM     02/22/2012 11:31 AM    MPV 10.7 08/10/2022 08:10 AM     Lab Results   Component Value Date/Time    TSH 2.92 12/11/2016 11:10 PM     Lab Results   Component Value Date/Time    CHOL 116 08/10/2022 08:10 AM    HDL 25 08/10/2022 08:10 AM    PSA 2.48 02/22/2012 11:31 AM    LABA1C 8.4 09/13/2022 04:01 PM       Assessment/Plan:      Diagnosis Orders   1. Controlled type 2 diabetes mellitus with chronic kidney disease on chronic dialysis, with long-term current use of insulin (HCC)  POCT glycosylated hemoglobin (Hb A1C)    CBC with Auto Differential    ALT    AST    Basic Metabolic Panel    Hemoglobin A1C    Lipid Panel    Microalbumin, Ur      2. Primary hypertension        3. Dyslipidemia        4. Screening PSA (prostate specific antigen)  PSA Screening        Continue all current medications. A1c is improved. We will see him back in 6 months with routine labs, sooner if any issues. 1.  Austin Del Angel received counseling on the following healthy behaviors: nutrition, exercise, and medication adherence  2. Patient given educational materials - see patient instructions  3. Was a self-tracking handout given in paper form or via [employer]? No  If yes, see orders or list here. 4.  Discussed use, benefit, and side effects of prescribed medications. Barriers to medication compliance addressed. All patient questions answered. Pt voiced understanding. 5.  Reviewed prior labs and health maintenance  6. Continue current medications, diet and exercise.     Completed Refills   Requested Prescriptions No prescriptions requested or ordered in this encounter         Return in about 6 months (around 3/13/2023) for Check up.

## 2022-09-13 NOTE — PATIENT INSTRUCTIONS
SURVEY:     You may be receiving a survey from [employer] regarding your visit today. Please complete the survey to enable us to provide the highest quality of care to you and your family. If you cannot score us a very good on any question, please call the office to discuss how we could have made your experience a very good one.      Thank you,    Linn Fisher, APRN-GABRIELLA Jara, APRN-CNP  ALVARO Mckoy, BHAVANA Campos, BHAVANA Jeffers, CMA  Dionna, MIKEL Graham, PM

## 2022-10-17 RX ORDER — DULOXETIN HYDROCHLORIDE 30 MG/1
CAPSULE, DELAYED RELEASE ORAL
Qty: 30 CAPSULE | Refills: 11 | Status: SHIPPED | OUTPATIENT
Start: 2022-10-17

## 2022-10-17 RX ORDER — UBIDECARENONE 100 MG
CAPSULE ORAL
Qty: 30 CAPSULE | Refills: 11 | Status: SHIPPED | OUTPATIENT
Start: 2022-10-17

## 2022-10-17 NOTE — TELEPHONE ENCOUNTER
Health Maintenance   Topic Date Due    Hepatitis B vaccine (1 of 3 - Risk Dialysis 4-dose series) Never done    Diabetic foot exam  01/14/2023 (Originally 11/16/2021)    Shingles vaccine (1 of 2) 01/14/2023 (Originally 11/15/2007)    Diabetic retinal exam  09/13/2023 (Originally 12/13/2017)    Flu vaccine (1) 09/13/2023 (Originally 8/1/2022)    COVID-19 Vaccine (1) 09/13/2023 (Originally 5/15/1956)    Lipids  08/10/2023    A1C test (Diabetic or Prediabetic)  09/13/2023    Depression Screen  09/13/2023    Colorectal Cancer Screen  09/14/2023    DTaP/Tdap/Td vaccine (3 - Td or Tdap) 04/12/2031    Pneumococcal 0-64 years Vaccine  Completed    Hepatitis C screen  Completed    HIV screen  Completed    Hepatitis A vaccine  Aged Out    Hib vaccine  Aged Out    Meningococcal (ACWY) vaccine  Aged Out             (applicable per patient's age: Cancer Screenings, Depression Screening, Fall Risk Screening, Immunizations)    Hemoglobin A1C (%)   Date Value   09/13/2022 8.4   08/10/2022 9.3 (H)   05/04/2022 10.2     Microalb/Crt.  Ratio (mcg/mg creat)   Date Value   08/10/2022 21 (H)     LDL Cholesterol (mg/dL)   Date Value   08/10/2022 65     AST (U/L)   Date Value   08/10/2022 13     ALT (U/L)   Date Value   08/10/2022 13     BUN (mg/dL)   Date Value   08/10/2022 19      (goal A1C is < 7)   (goal LDL is <100) need 30-50% reduction from baseline     BP Readings from Last 3 Encounters:   09/13/22 128/78   05/04/22 136/84   03/15/22 (!) 166/109    (goal /80)      All Future Testing planned in [facility]:  Lab Frequency Next Occurrence   CBC with Auto Differential Once 03/12/2023   ALT Once 03/13/2023   AST Once 09/39/2426   Basic Metabolic Panel Once 51/90/7447   Hemoglobin A1C Once 03/12/2023   Lipid Panel Once 03/12/2023   Microalbumin, Ur Once 03/12/2023   PSA Screening Once 03/13/2023       Next Visit Date:  Future Appointments   Date Time Provider Penelope Pham   12/13/2022  3:40 PM RAY Burr - GABRIELLA [facility]            Patient Active Problem List:     Uncontrolled type 2 diabetes mellitus with chronic kidney disease, with long-term current use of insulin     Dyslipidemia     Gout     Primary hypertension     Uncontrolled type 2 diabetes mellitus with diabetic mononeuropathy, with long-term current use of insulin     CKD (chronic kidney disease) stage 3, GFR 30-59 ml/min (HCC)     BLANE (acute kidney injury) ([facility] [address]     Diabetic polyneuropathy associated with type 2 diabetes mellitus (HCC)     Fatty liver     Normocytic normochromic anemia     Positive FIT (fecal immunochemical test)

## 2022-10-25 DIAGNOSIS — E78.5 DYSLIPIDEMIA: ICD-10-CM

## 2022-10-25 RX ORDER — ATORVASTATIN CALCIUM 40 MG/1
40 TABLET, FILM COATED ORAL DAILY
Qty: 90 TABLET | Refills: 3 | Status: SHIPPED | OUTPATIENT
Start: 2022-10-25

## 2022-10-25 NOTE — TELEPHONE ENCOUNTER
Health Maintenance   Topic Date Due    Hepatitis B vaccine (1 of 3 - Risk Dialysis 4-dose series) Never done    Diabetic foot exam  01/14/2023 (Originally 11/16/2021)    Shingles vaccine (1 of 2) 01/14/2023 (Originally 11/15/2007)    Diabetic retinal exam  09/13/2023 (Originally 12/13/2017)    Flu vaccine (1) 09/13/2023 (Originally 8/1/2022)    COVID-19 Vaccine (1) 09/13/2023 (Originally 5/15/1956)    Lipids  08/10/2023    A1C test (Diabetic or Prediabetic)  09/13/2023    Depression Screen  09/13/2023    Colorectal Cancer Screen  09/14/2023    DTaP/Tdap/Td vaccine (3 - Td or Tdap) 04/12/2031    Pneumococcal 0-64 years Vaccine  Completed    Hepatitis C screen  Completed    HIV screen  Completed    Hepatitis A vaccine  Aged Out    Hib vaccine  Aged Out    Meningococcal (ACWY) vaccine  Aged Out             (applicable per patient's age: Cancer Screenings, Depression Screening, Fall Risk Screening, Immunizations)    Hemoglobin A1C (%)   Date Value   09/13/2022 8.4   08/10/2022 9.3 (H)   05/04/2022 10.2     Microalb/Crt.  Ratio (mcg/mg creat)   Date Value   08/10/2022 21 (H)     LDL Cholesterol (mg/dL)   Date Value   08/10/2022 65     AST (U/L)   Date Value   08/10/2022 13     ALT (U/L)   Date Value   08/10/2022 13     BUN (mg/dL)   Date Value   08/10/2022 19      (goal A1C is < 7)   (goal LDL is <100) need 30-50% reduction from baseline     BP Readings from Last 3 Encounters:   09/13/22 128/78   05/04/22 136/84   03/15/22 (!) 166/109    (goal /80)      All Future Testing planned in [facility]:  Lab Frequency Next Occurrence   CBC with Auto Differential Once 03/12/2023   ALT Once 03/13/2023   AST Once 50/68/5234   Basic Metabolic Panel Once 07/95/8878   Hemoglobin A1C Once 03/12/2023   Lipid Panel Once 03/12/2023   Microalbumin, Ur Once 03/12/2023   PSA Screening Once 03/13/2023       Next Visit Date:  Future Appointments   Date Time Provider Penelope Pham   12/13/2022  3:40 PM RAY Travis - GABRIELLA [facility]            Patient Active Problem List:     Uncontrolled type 2 diabetes mellitus with chronic kidney disease, with long-term current use of insulin     Dyslipidemia     Gout     Primary hypertension     Uncontrolled type 2 diabetes mellitus with diabetic mononeuropathy, with long-term current use of insulin     CKD (chronic kidney disease) stage 3, GFR 30-59 ml/min (HCC)     BLANE (acute kidney injury) ([facility] [address]     Diabetic polyneuropathy associated with type 2 diabetes mellitus (HCC)     Fatty liver     Normocytic normochromic anemia     Positive FIT (fecal immunochemical test)

## 2022-10-28 RX ORDER — ASPIRIN 81 MG/1
TABLET, COATED ORAL
Qty: 30 TABLET | Refills: 11 | Status: SHIPPED | OUTPATIENT
Start: 2022-10-28

## 2022-10-28 RX ORDER — INSULIN DEGLUDEC 200 U/ML
INJECTION, SOLUTION SUBCUTANEOUS
Qty: 9 ML | Refills: 11 | Status: SHIPPED | OUTPATIENT
Start: 2022-10-28

## 2022-10-28 NOTE — TELEPHONE ENCOUNTER
Health Maintenance   Topic Date Due    Hepatitis B vaccine (1 of 3 - Risk Dialysis 4-dose series) Never done    Diabetic foot exam  01/14/2023 (Originally 11/16/2021)    Shingles vaccine (1 of 2) 01/14/2023 (Originally 11/15/2007)    Diabetic retinal exam  09/13/2023 (Originally 12/13/2017)    Flu vaccine (1) 09/13/2023 (Originally 8/1/2022)    COVID-19 Vaccine (1) 09/13/2023 (Originally 5/15/1956)    Lipids  08/10/2023    A1C test (Diabetic or Prediabetic)  09/13/2023    Depression Screen  09/13/2023    Colorectal Cancer Screen  09/14/2023    DTaP/Tdap/Td vaccine (3 - Td or Tdap) 04/12/2031    Pneumococcal 0-64 years Vaccine  Completed    Hepatitis C screen  Completed    HIV screen  Completed    Hepatitis A vaccine  Aged Out    Hib vaccine  Aged Out    Meningococcal (ACWY) vaccine  Aged Out             (applicable per patient's age: Cancer Screenings, Depression Screening, Fall Risk Screening, Immunizations)    Hemoglobin A1C (%)   Date Value   09/13/2022 8.4   08/10/2022 9.3 (H)   05/04/2022 10.2     Microalb/Crt.  Ratio (mcg/mg creat)   Date Value   08/10/2022 21 (H)     LDL Cholesterol (mg/dL)   Date Value   08/10/2022 65     AST (U/L)   Date Value   08/10/2022 13     ALT (U/L)   Date Value   08/10/2022 13     BUN (mg/dL)   Date Value   08/10/2022 19      (goal A1C is < 7)   (goal LDL is <100) need 30-50% reduction from baseline     BP Readings from Last 3 Encounters:   09/13/22 128/78   05/04/22 136/84   03/15/22 (!) 166/109    (goal /80)      All Future Testing planned in [facility]:  Lab Frequency Next Occurrence   CBC with Auto Differential Once 03/12/2023   ALT Once 03/13/2023   AST Once 59/68/9603   Basic Metabolic Panel Once 72/49/9401   Hemoglobin A1C Once 03/12/2023   Lipid Panel Once 03/12/2023   Microalbumin, Ur Once 03/12/2023   PSA Screening Once 03/13/2023       Next Visit Date:  Future Appointments   Date Time Provider Penelope Pham   12/13/2022  3:40 PM RAY Ruelas - GABRIELLA [facility]            Patient Active Problem List:     Uncontrolled type 2 diabetes mellitus with chronic kidney disease, with long-term current use of insulin     Dyslipidemia     Gout     Primary hypertension     Uncontrolled type 2 diabetes mellitus with diabetic mononeuropathy, with long-term current use of insulin     CKD (chronic kidney disease) stage 3, GFR 30-59 ml/min (HCC)     BLANE (acute kidney injury) ([facility] [address]     Diabetic polyneuropathy associated with type 2 diabetes mellitus (HCC)     Fatty liver     Normocytic normochromic anemia     Positive FIT (fecal immunochemical test)

## 2022-11-06 DIAGNOSIS — J30.81 CHRONIC ALLERGIC RHINITIS DUE TO ANIMAL HAIR AND DANDER: ICD-10-CM

## 2022-11-07 DIAGNOSIS — J30.81 CHRONIC ALLERGIC RHINITIS DUE TO ANIMAL HAIR AND DANDER: ICD-10-CM

## 2022-11-07 RX ORDER — MONTELUKAST SODIUM 10 MG/1
TABLET ORAL
Qty: 30 TABLET | Refills: 10 | OUTPATIENT
Start: 2022-11-07

## 2022-11-08 RX ORDER — MONTELUKAST SODIUM 10 MG/1
TABLET ORAL
Qty: 90 TABLET | Refills: 1 | Status: SHIPPED | OUTPATIENT
Start: 2022-11-08

## 2022-11-08 NOTE — TELEPHONE ENCOUNTER
Health Maintenance   Topic Date Due    Hepatitis B vaccine (1 of 3 - Risk Dialysis 4-dose series) Never done    Diabetic foot exam  01/14/2023 (Originally 11/16/2021)    Shingles vaccine (1 of 2) 01/14/2023 (Originally 11/15/2007)    Diabetic retinal exam  09/13/2023 (Originally 12/13/2017)    Flu vaccine (1) 09/13/2023 (Originally 8/1/2022)    COVID-19 Vaccine (1) 09/13/2023 (Originally 5/15/1956)    Lipids  08/10/2023    A1C test (Diabetic or Prediabetic)  09/13/2023    Depression Screen  09/13/2023    Colorectal Cancer Screen  09/14/2023    DTaP/Tdap/Td vaccine (3 - Td or Tdap) 04/12/2031    Pneumococcal 0-64 years Vaccine  Completed    Hepatitis C screen  Completed    HIV screen  Completed    Hepatitis A vaccine  Aged Out    Hib vaccine  Aged Out    Meningococcal (ACWY) vaccine  Aged Out             (applicable per patient's age: Cancer Screenings, Depression Screening, Fall Risk Screening, Immunizations)    Hemoglobin A1C (%)   Date Value   09/13/2022 8.4   08/10/2022 9.3 (H)   05/04/2022 10.2     Microalb/Crt.  Ratio (mcg/mg creat)   Date Value   08/10/2022 21 (H)     LDL Cholesterol (mg/dL)   Date Value   08/10/2022 65     AST (U/L)   Date Value   08/10/2022 13     ALT (U/L)   Date Value   08/10/2022 13     BUN (mg/dL)   Date Value   08/10/2022 19      (goal A1C is < 7)   (goal LDL is <100) need 30-50% reduction from baseline     BP Readings from Last 3 Encounters:   10/28/22 (!) 177/109   09/13/22 128/78   05/04/22 136/84    (goal /80)      All Future Testing planned in [facility]:  Lab Frequency Next Occurrence   CBC with Auto Differential Once 03/12/2023   ALT Once 03/13/2023   AST Once 08/12/1659   Basic Metabolic Panel Once 40/17/3586   Hemoglobin A1C Once 03/12/2023   Lipid Panel Once 03/12/2023   Microalbumin, Ur Once 03/12/2023   PSA Screening Once 03/13/2023       Next Visit Date:  Future Appointments   Date Time Provider Penelope Pham   12/13/2022  3:40 PM RAY King - GABRIELLA [facility]            Patient Active Problem List:     Uncontrolled type 2 diabetes mellitus with chronic kidney disease, with long-term current use of insulin     Dyslipidemia     Gout     Primary hypertension     Uncontrolled type 2 diabetes mellitus with diabetic mononeuropathy, with long-term current use of insulin     CKD (chronic kidney disease) stage 3, GFR 30-59 ml/min (HCC)     BLANE (acute kidney injury) ([facility] [address]     Diabetic polyneuropathy associated with type 2 diabetes mellitus (HCC)     Fatty liver     Normocytic normochromic anemia     Positive FIT (fecal immunochemical test)

## 2023-01-31 RX ORDER — PEN NEEDLE, DIABETIC 31 GX5/16"
NEEDLE, DISPOSABLE MISCELLANEOUS
Qty: 100 EACH | Refills: 11 | Status: SHIPPED | OUTPATIENT
Start: 2023-01-31

## 2023-01-31 RX ORDER — LISINOPRIL 5 MG/1
TABLET ORAL
Qty: 31 TABLET | Refills: 11 | Status: SHIPPED | OUTPATIENT
Start: 2023-01-31

## 2023-01-31 NOTE — TELEPHONE ENCOUNTER
Date Time Provider Penelope Pham   3/8/2023  9:00 AM Gracy Fisher, APRN - CNP [facility]            Patient Active Problem List:     Uncontrolled type 2 diabetes mellitus with chronic kidney disease, with long-term current use of insulin     Dyslipidemia     Gout     Primary hypertension     Uncontrolled type 2 diabetes mellitus with diabetic mononeuropathy, with long-term current use of insulin     CKD (chronic kidney disease) stage 3, GFR 30-59 ml/min (HCC)     BLANE (acute kidney injury) ([facility] [address]     Diabetic polyneuropathy associated with type 2 diabetes mellitus (HCC)     Fatty liver     Normocytic normochromic anemia     Positive FIT (fecal immunochemical test)

## 2023-03-01 ENCOUNTER — TELEPHONE (OUTPATIENT)
Dept: PRIMARY CARE CLINIC | Age: 68
End: 2023-03-01

## 2023-03-13 RX ORDER — LIRAGLUTIDE 6 MG/ML
INJECTION SUBCUTANEOUS
Qty: 9 ML | Refills: 5 | Status: SHIPPED | OUTPATIENT
Start: 2023-03-13

## 2023-03-16 RX ORDER — INSULIN ASPART 100 [IU]/ML
6 INJECTION, SOLUTION INTRAVENOUS; SUBCUTANEOUS
Qty: 5 ADJUSTABLE DOSE PRE-FILLED PEN SYRINGE | Refills: 5 | Status: SHIPPED | OUTPATIENT
Start: 2023-03-16

## 2023-03-16 RX ORDER — INSULIN LISPRO 100 [IU]/ML
6 INJECTION, SOLUTION INTRAVENOUS; SUBCUTANEOUS
Qty: 5 ADJUSTABLE DOSE PRE-FILLED PEN SYRINGE | Refills: 3 | Status: CANCELLED | OUTPATIENT
Start: 2023-03-16

## 2023-03-16 NOTE — TELEPHONE ENCOUNTER
Please let him know I sent a prescription for Novolog instead of Humalog due to insurance. Same type of medication, same dose.

## 2023-03-16 NOTE — TELEPHONE ENCOUNTER
Health Maintenance   Topic Date Due    Shingles vaccine (1 of 2) Never done    Diabetic foot exam  11/16/2021    AAA screen  11/15/2022    Diabetic retinal exam  09/13/2023 (Originally 12/13/2017)    Flu vaccine (1) 09/13/2023 (Originally 8/1/2022)    COVID-19 Vaccine (1) 09/13/2023 (Originally 5/15/1956)    Diabetic Alb to Cr ratio (uACR) test  08/10/2023    Lipids  08/10/2023    GFR test (Diabetes, CKD 3-4, OR last GFR 15-59)  08/10/2023    A1C test (Diabetic or Prediabetic)  09/13/2023    Depression Screen  09/13/2023    Colorectal Cancer Screen  09/14/2023    DTaP/Tdap/Td vaccine (3 - Td or Tdap) 04/12/2031    Pneumococcal 65+ years Vaccine  Completed    Hepatitis C screen  Completed    HIV screen  Completed    Hepatitis A vaccine  Aged Out    Hib vaccine  Aged Out    Meningococcal (ACWY) vaccine  Aged Out             (applicable per patient's age: Cancer Screenings, Depression Screening, Fall Risk Screening, Immunizations)    Hemoglobin A1C (%)   Date Value   09/13/2022 8.4   08/10/2022 9.3 (H)   05/04/2022 10.2     Microalb/Crt.  Ratio (mcg/mg creat)   Date Value   08/10/2022 21 (H)     LDL Cholesterol (mg/dL)   Date Value   08/10/2022 65     AST (U/L)   Date Value   08/10/2022 13     ALT (U/L)   Date Value   08/10/2022 13     BUN (mg/dL)   Date Value   08/10/2022 19      (goal A1C is < 7)   (goal LDL is <100) need 30-50% reduction from baseline     BP Readings from Last 3 Encounters:   10/28/22 (!) 177/109   09/13/22 128/78   05/04/22 136/84    (goal /80)      All Future Testing planned in [facility]:  Lab Frequency Next Occurrence   CBC with Auto Differential Once 03/12/2023   ALT Once 03/13/2023   AST Once 30/58/2925   Basic Metabolic Panel Once 65/05/4738   Hemoglobin A1C Once 03/12/2023   Lipid Panel Once 03/12/2023   Microalbumin, Ur Once 03/12/2023   PSA Screening Once 03/13/2023       Next Visit Date:  Future Appointments   Date Time Provider Penelope Pham   4/6/2023  4:00 PM RAY Downey - CNP [facility]            Patient Active Problem List:     Uncontrolled type 2 diabetes mellitus with chronic kidney disease, with long-term current use of insulin     Dyslipidemia     Gout     Primary hypertension     Uncontrolled type 2 diabetes mellitus with diabetic mononeuropathy, with long-term current use of insulin     CKD (chronic kidney disease) stage 3, GFR 30-59 ml/min (HCC)     BLANE (acute kidney injury) ([facility] [address]     Diabetic polyneuropathy associated with type 2 diabetes mellitus (HCC)     Fatty liver     Normocytic normochromic anemia     Positive FIT (fecal immunochemical test)

## 2023-04-06 ENCOUNTER — OFFICE VISIT (OUTPATIENT)
Dept: PRIMARY CARE CLINIC | Age: 68
End: 2023-04-06
Payer: COMMERCIAL

## 2023-04-06 VITALS
TEMPERATURE: 97.7 F | HEART RATE: 82 BPM | RESPIRATION RATE: 20 BRPM | WEIGHT: 191.1 LBS | SYSTOLIC BLOOD PRESSURE: 136 MMHG | OXYGEN SATURATION: 97 % | DIASTOLIC BLOOD PRESSURE: 82 MMHG | BODY MASS INDEX: 29.93 KG/M2

## 2023-04-06 DIAGNOSIS — E78.5 DYSLIPIDEMIA: ICD-10-CM

## 2023-04-06 DIAGNOSIS — Z99.2 CONTROLLED TYPE 2 DIABETES MELLITUS WITH CHRONIC KIDNEY DISEASE ON CHRONIC DIALYSIS, WITH LONG-TERM CURRENT USE OF INSULIN (HCC): Primary | ICD-10-CM

## 2023-04-06 DIAGNOSIS — E11.22 CONTROLLED TYPE 2 DIABETES MELLITUS WITH CHRONIC KIDNEY DISEASE ON CHRONIC DIALYSIS, WITH LONG-TERM CURRENT USE OF INSULIN (HCC): Primary | ICD-10-CM

## 2023-04-06 DIAGNOSIS — N18.6 CONTROLLED TYPE 2 DIABETES MELLITUS WITH CHRONIC KIDNEY DISEASE ON CHRONIC DIALYSIS, WITH LONG-TERM CURRENT USE OF INSULIN (HCC): Primary | ICD-10-CM

## 2023-04-06 DIAGNOSIS — I10 PRIMARY HYPERTENSION: ICD-10-CM

## 2023-04-06 DIAGNOSIS — Z79.4 CONTROLLED TYPE 2 DIABETES MELLITUS WITH CHRONIC KIDNEY DISEASE ON CHRONIC DIALYSIS, WITH LONG-TERM CURRENT USE OF INSULIN (HCC): Primary | ICD-10-CM

## 2023-04-06 DIAGNOSIS — R07.89 ATYPICAL CHEST PAIN: ICD-10-CM

## 2023-04-06 PROBLEM — L98.491 ULCER OF EXTERNAL EAR, LIMITED TO BREAKDOWN OF SKIN (HCC): Status: RESOLVED | Noted: 2023-04-06 | Resolved: 2023-04-06

## 2023-04-06 PROBLEM — L98.491 ULCER OF EXTERNAL EAR, LIMITED TO BREAKDOWN OF SKIN (HCC): Status: ACTIVE | Noted: 2023-04-06

## 2023-04-06 LAB — HBA1C MFR BLD: 9.7 %

## 2023-04-06 PROCEDURE — 1123F ACP DISCUSS/DSCN MKR DOCD: CPT | Performed by: NURSE PRACTITIONER

## 2023-04-06 PROCEDURE — 3074F SYST BP LT 130 MM HG: CPT | Performed by: NURSE PRACTITIONER

## 2023-04-06 PROCEDURE — 3078F DIAST BP <80 MM HG: CPT | Performed by: NURSE PRACTITIONER

## 2023-04-06 PROCEDURE — 99214 OFFICE O/P EST MOD 30 MIN: CPT | Performed by: NURSE PRACTITIONER

## 2023-04-06 PROCEDURE — 3046F HEMOGLOBIN A1C LEVEL >9.0%: CPT | Performed by: NURSE PRACTITIONER

## 2023-04-06 PROCEDURE — 83036 HEMOGLOBIN GLYCOSYLATED A1C: CPT | Performed by: NURSE PRACTITIONER

## 2023-04-06 SDOH — ECONOMIC STABILITY: HOUSING INSECURITY
IN THE LAST 12 MONTHS, WAS THERE A TIME WHEN YOU DID NOT HAVE A STEADY PLACE TO SLEEP OR SLEPT IN A SHELTER (INCLUDING NOW)?: PATIENT REFUSED

## 2023-04-06 SDOH — ECONOMIC STABILITY: INCOME INSECURITY: HOW HARD IS IT FOR YOU TO PAY FOR THE VERY BASICS LIKE FOOD, HOUSING, MEDICAL CARE, AND HEATING?: PATIENT DECLINED

## 2023-04-06 SDOH — ECONOMIC STABILITY: FOOD INSECURITY: WITHIN THE PAST 12 MONTHS, YOU WORRIED THAT YOUR FOOD WOULD RUN OUT BEFORE YOU GOT MONEY TO BUY MORE.: PATIENT DECLINED

## 2023-04-06 SDOH — ECONOMIC STABILITY: FOOD INSECURITY: WITHIN THE PAST 12 MONTHS, THE FOOD YOU BOUGHT JUST DIDN'T LAST AND YOU DIDN'T HAVE MONEY TO GET MORE.: PATIENT DECLINED

## 2023-04-06 ASSESSMENT — PATIENT HEALTH QUESTIONNAIRE - PHQ9
1. LITTLE INTEREST OR PLEASURE IN DOING THINGS: 0
SUM OF ALL RESPONSES TO PHQ QUESTIONS 1-9: 0
SUM OF ALL RESPONSES TO PHQ QUESTIONS 1-9: 0
SUM OF ALL RESPONSES TO PHQ9 QUESTIONS 1 & 2: 0
2. FEELING DOWN, DEPRESSED OR HOPELESS: 0
SUM OF ALL RESPONSES TO PHQ QUESTIONS 1-9: 0
SUM OF ALL RESPONSES TO PHQ QUESTIONS 1-9: 0

## 2023-04-06 NOTE — PATIENT INSTRUCTIONS
SURVEY:     You may be receiving a survey from [employer] regarding your visit today. Please complete the survey to enable us to provide the highest quality of care to you and your family. If you cannot score us a very good on any question, please call the office to discuss how we could have made your experience a very good one.      Thank you,    Mina Fisher, APRN-CNP  Isela Barker, APRN-CNP  Nubia Ruelas, ALVARO Butts, BHAVANA Gerardo, BHAVANA Jeffers, CMA  Dionna, PCA  Gladys, PM

## 2023-04-06 NOTE — PROGRESS NOTES
Name: Audrey Mejias  :          Chief Complaint:     Chief Complaint   Patient presents with    Diabetes     Routine check. Pain     Pain on left side \"when I eat starts axilla area radiates mid chest causing \"michael\" in chest area. History of Present Illness:      Audrey Mejias is a 74 y.o.  male who presents with Diabetes (Routine check. ) and Pain (Pain on left side \"when I eat starts axilla area radiates mid chest causing \"michael\" in chest area.)      Sandy Raza is here today for a routine office visit. DM-worsening, not sure he is taking all of his insulin and medications. See below for further comment. Diabetes  He presents for his follow-up diabetic visit. He has type 2 diabetes mellitus. His disease course has been worsening. There are no hypoglycemic associated symptoms. Pertinent negatives for hypoglycemia include no dizziness, headaches or seizures. Associated symptoms include chest pain and foot paresthesias. Pertinent negatives for diabetes include no blurred vision, no fatigue, no polydipsia, no polyphagia, no polyuria and no weakness. There are no hypoglycemic complications. Symptoms are stable. Diabetic complications include a CVA, nephropathy and peripheral neuropathy. Pertinent negatives for diabetic complications include no heart disease or PVD. Risk factors for coronary artery disease include diabetes mellitus, dyslipidemia, hypertension and male sex. Current diabetic treatment includes oral agent (dual therapy) and insulin injections. He is compliant with treatment all of the time. His weight is stable. He is following a generally healthy diet. When asked about meal planning, he reported none. He participates in exercise daily. His home blood glucose trend is increasing steadily. His breakfast blood glucose range is generally >200 mg/dl. An ACE inhibitor/angiotensin II receptor blocker is being taken. He does not see a podiatrist.Eye exam is not current.

## 2023-04-07 ASSESSMENT — ENCOUNTER SYMPTOMS
WHEEZING: 0
CONSTIPATION: 0
VOMITING: 0
ABDOMINAL PAIN: 0
SORE THROAT: 0
NAUSEA: 0
RHINORRHEA: 0
DIARRHEA: 0
SPUTUM PRODUCTION: 0
BLURRED VISION: 0
HEMOPTYSIS: 0
SHORTNESS OF BREATH: 0
ORTHOPNEA: 0
BACK PAIN: 0
COUGH: 0

## 2023-04-07 ASSESSMENT — COPD QUESTIONNAIRES: COPD: 1

## 2023-04-19 ENCOUNTER — HOSPITAL ENCOUNTER (OUTPATIENT)
Dept: NON INVASIVE DIAGNOSTICS | Age: 68
Discharge: HOME | End: 2023-04-19
Payer: MEDICARE

## 2023-04-19 DIAGNOSIS — R07.89 ATYPICAL CHEST PAIN: ICD-10-CM

## 2023-04-19 PROCEDURE — 3430000000 HC RX DIAGNOSTIC RADIOPHARMACEUTICAL: Performed by: NURSE PRACTITIONER

## 2023-04-19 PROCEDURE — 93017 CV STRESS TEST TRACING ONLY: CPT

## 2023-04-19 PROCEDURE — A9500 TC99M SESTAMIBI: HCPCS | Performed by: NURSE PRACTITIONER

## 2023-04-19 RX ORDER — TETRAKIS(2-METHOXYISOBUTYLISOCYANIDE)COPPER(I) TETRAFLUOROBORATE 1 MG/ML
30 INJECTION, POWDER, LYOPHILIZED, FOR SOLUTION INTRAVENOUS
Status: COMPLETED | OUTPATIENT
Start: 2023-04-19 | End: 2023-04-19

## 2023-04-19 RX ADMIN — Medication 30 MILLICURIE: at 09:57

## 2023-04-20 ENCOUNTER — HOSPITAL ENCOUNTER (OUTPATIENT)
Dept: NON INVASIVE DIAGNOSTICS | Age: 68
Discharge: HOME | End: 2023-04-20
Payer: MEDICARE

## 2023-04-20 PROCEDURE — 3430000000 HC RX DIAGNOSTIC RADIOPHARMACEUTICAL: Performed by: NURSE PRACTITIONER

## 2023-04-20 PROCEDURE — A9500 TC99M SESTAMIBI: HCPCS | Performed by: NURSE PRACTITIONER

## 2023-04-20 PROCEDURE — 78452 HT MUSCLE IMAGE SPECT MULT: CPT

## 2023-04-20 RX ORDER — TETRAKIS(2-METHOXYISOBUTYLISOCYANIDE)COPPER(I) TETRAFLUOROBORATE 1 MG/ML
30 INJECTION, POWDER, LYOPHILIZED, FOR SOLUTION INTRAVENOUS
Status: COMPLETED | OUTPATIENT
Start: 2023-04-20 | End: 2023-04-20

## 2023-04-20 RX ADMIN — Medication 30 MILLICURIE: at 14:08

## 2023-04-21 NOTE — PROCEDURES
[address], [place] [address]                              CARDIAC STRESS TEST    PATIENT NAME: Jose J Skinner                    :        1955  MED REC NO:   [id number]                              ROOM:  ACCOUNT NO:   [de-identified]                           ADMIT DATE: 2023  PROVIDER:     Anne Patel MD    CARDIOVASCULAR DIAGNOSTIC DEPARTMENT    DATE OF STUDY:  2023    ORDERING PROVIDER:  ARLEEN Roberts    PRIMARY CARE PROVIDER:  ARLEEN Roberts    INTERPRETING PHYSICIAN:  Anne Patel MD    EXERCISE MYOCARDIAL PERFUSION STRESS TEST REPORT    Stress/rest single-isotope SPECT imaging with exercise stress and gated  SPECT imaging. INDICATION:  Assessment of recent chest pain and/or discomfort. CLINICAL HISTORY:  The patient is a 77-year-old man with no known  coronary artery disease. Previous cardiac history includes stress. Other previous history includes chest pain, cerebrovascular accident,  diabetes mellitus, arthritis, caffeine, smoker, hypertension, family  history of coronary artery disease in father under age 61. Symptoms just prior to testing:  None. Relevant medications:  None. PROCEDURE:  The patient performed treadmill exercise using a Jim  protocol, completing 5:14 minutes and completing an estimated workload  of 3.66 metabolic equivalents (METS). The test was terminated due to fatigue and shortness of breath. The heart rate was 82 beats per minute at baseline and increased to 133  beats per minute at peak exercise, which was 86% of the maximum  predicted heart rate. The rest blood pressure was 142/90 mmHg and  increased to 164/86 mmHg. During the procedure, the patient developed  fatigue, shortness of breath and leg fatigue, but denied any chest  discomfort.     Myocardial perfusion imaging: Imaging was performed at rest 30-45  minutes following the

## 2023-04-24 ENCOUNTER — TELEPHONE (OUTPATIENT)
Dept: PRIMARY CARE CLINIC | Age: 68
End: 2023-04-24

## 2023-04-24 NOTE — TELEPHONE ENCOUNTER
----- Message from [address] [place] [address], RAY - CNP sent at 4/21/2023  3:30 PM EDT -----  Results are normal, please call patient and make them aware.

## 2023-05-24 ENCOUNTER — TELEPHONE (OUTPATIENT)
Dept: PRIMARY CARE CLINIC | Age: 68
End: 2023-05-24

## 2023-05-24 DIAGNOSIS — H92.09 OTALGIA, UNSPECIFIED LATERALITY: Primary | ICD-10-CM

## 2023-05-24 NOTE — TELEPHONE ENCOUNTER
----- Message from Jason Mcduffie sent at 5/24/2023 11:11 AM EDT -----  Subject: Referral Request    Reason for referral request? ENT referral regarding ear  Provider patient wants to be referred to(if known): Renuka Jorden Might    Provider Phone Number(if known):[phone]    Additional Information for Provider?  [facility] SURGICAL [facility] fax [phone]    [phone]  ---------------------------------------------------------------------------  --------------  [facility] INFO    [phone]; OK to leave message on voicemail  ---------------------------------------------------------------------------  --------------

## 2023-05-24 NOTE — TELEPHONE ENCOUNTER
Patient requesting a referral to [facility] for ENT for an ear issue. Please advise.

## 2023-07-06 ENCOUNTER — OFFICE VISIT (OUTPATIENT)
Dept: PRIMARY CARE CLINIC | Age: 68
End: 2023-07-06
Payer: MEDICARE

## 2023-07-06 VITALS
OXYGEN SATURATION: 98 % | WEIGHT: 193 LBS | SYSTOLIC BLOOD PRESSURE: 128 MMHG | TEMPERATURE: 97.5 F | HEART RATE: 82 BPM | DIASTOLIC BLOOD PRESSURE: 82 MMHG | RESPIRATION RATE: 20 BRPM | BODY MASS INDEX: 30.23 KG/M2

## 2023-07-06 DIAGNOSIS — Z12.5 SCREENING PSA (PROSTATE SPECIFIC ANTIGEN): ICD-10-CM

## 2023-07-06 DIAGNOSIS — I10 PRIMARY HYPERTENSION: ICD-10-CM

## 2023-07-06 DIAGNOSIS — E11.65 UNCONTROLLED TYPE 2 DIABETES MELLITUS WITH HYPERGLYCEMIA (HCC): Primary | ICD-10-CM

## 2023-07-06 PROCEDURE — 99214 OFFICE O/P EST MOD 30 MIN: CPT | Performed by: NURSE PRACTITIONER

## 2023-07-06 PROCEDURE — 3074F SYST BP LT 130 MM HG: CPT | Performed by: NURSE PRACTITIONER

## 2023-07-06 PROCEDURE — 2022F DILAT RTA XM EVC RTNOPTHY: CPT | Performed by: NURSE PRACTITIONER

## 2023-07-06 PROCEDURE — 1123F ACP DISCUSS/DSCN MKR DOCD: CPT | Performed by: NURSE PRACTITIONER

## 2023-07-06 PROCEDURE — 3046F HEMOGLOBIN A1C LEVEL >9.0%: CPT | Performed by: NURSE PRACTITIONER

## 2023-07-06 PROCEDURE — 3017F COLORECTAL CA SCREEN DOC REV: CPT | Performed by: NURSE PRACTITIONER

## 2023-07-06 PROCEDURE — G8417 CALC BMI ABV UP PARAM F/U: HCPCS | Performed by: NURSE PRACTITIONER

## 2023-07-06 PROCEDURE — 3079F DIAST BP 80-89 MM HG: CPT | Performed by: NURSE PRACTITIONER

## 2023-07-06 PROCEDURE — 83037 HB GLYCOSYLATED A1C HOME DEV: CPT | Performed by: NURSE PRACTITIONER

## 2023-07-06 PROCEDURE — G8427 DOCREV CUR MEDS BY ELIG CLIN: HCPCS | Performed by: NURSE PRACTITIONER

## 2023-07-06 PROCEDURE — 4004F PT TOBACCO SCREEN RCVD TLK: CPT | Performed by: NURSE PRACTITIONER

## 2023-07-06 RX ORDER — INSULIN ASPART 100 [IU]/ML
10 INJECTION, SOLUTION INTRAVENOUS; SUBCUTANEOUS
Qty: 5 ADJUSTABLE DOSE PRE-FILLED PEN SYRINGE | Refills: 5 | Status: SHIPPED | OUTPATIENT
Start: 2023-07-06

## 2023-07-06 RX ORDER — INSULIN DEGLUDEC 200 U/ML
60 INJECTION, SOLUTION SUBCUTANEOUS DAILY
Qty: 9 ML | Refills: 11 | Status: SHIPPED | OUTPATIENT
Start: 2023-07-06

## 2023-07-06 SDOH — ECONOMIC STABILITY: FOOD INSECURITY: WITHIN THE PAST 12 MONTHS, YOU WORRIED THAT YOUR FOOD WOULD RUN OUT BEFORE YOU GOT MONEY TO BUY MORE.: PATIENT DECLINED

## 2023-07-06 SDOH — ECONOMIC STABILITY: INCOME INSECURITY: HOW HARD IS IT FOR YOU TO PAY FOR THE VERY BASICS LIKE FOOD, HOUSING, MEDICAL CARE, AND HEATING?: PATIENT DECLINED

## 2023-07-06 SDOH — ECONOMIC STABILITY: FOOD INSECURITY: WITHIN THE PAST 12 MONTHS, THE FOOD YOU BOUGHT JUST DIDN'T LAST AND YOU DIDN'T HAVE MONEY TO GET MORE.: PATIENT DECLINED

## 2023-07-06 ASSESSMENT — PATIENT HEALTH QUESTIONNAIRE - PHQ9
SUM OF ALL RESPONSES TO PHQ QUESTIONS 1-9: 0
SUM OF ALL RESPONSES TO PHQ9 QUESTIONS 1 & 2: 0
2. FEELING DOWN, DEPRESSED OR HOPELESS: 0
SUM OF ALL RESPONSES TO PHQ QUESTIONS 1-9: 0
1. LITTLE INTEREST OR PLEASURE IN DOING THINGS: 0

## 2023-07-06 ASSESSMENT — ENCOUNTER SYMPTOMS
RHINORRHEA: 0
BLURRED VISION: 0
SORE THROAT: 0
CONSTIPATION: 0
ABDOMINAL PAIN: 0
SHORTNESS OF BREATH: 0
VOMITING: 0
COUGH: 0
NAUSEA: 0
DIARRHEA: 0
WHEEZING: 0

## 2023-07-06 NOTE — PROGRESS NOTES
Name: Araceli Galindo  :          Chief Complaint:     Chief Complaint   Patient presents with    Diabetes     Routine check. ,       History of Present Illness:      Araceli Galindo is a 74 y.o.  male who presents with Diabetes (Routine check. ,)      Yonis Lopez is here today for a routine office visit. DM-worsening, patient is not sure that he is taking the correct insulins. He has all of his other medications in a pill pack which he states he has been compliant with. See below for further comment. Diabetes  He presents for his follow-up diabetic visit. He has type 2 diabetes mellitus. His disease course has been worsening. There are no hypoglycemic associated symptoms. Pertinent negatives for hypoglycemia include no dizziness or headaches. Associated symptoms include foot paresthesias. Pertinent negatives for diabetes include no blurred vision, no chest pain, no fatigue, no polydipsia, no polyphagia and no polyuria. There are no hypoglycemic complications. Symptoms are stable. Diabetic complications include a CVA, nephropathy and peripheral neuropathy. Pertinent negatives for diabetic complications include no heart disease. Risk factors for coronary artery disease include diabetes mellitus, dyslipidemia, hypertension and male sex. Current diabetic treatment includes oral agent (dual therapy) and insulin injections. He is compliant with treatment all of the time. His weight is stable. He is following a generally healthy diet. When asked about meal planning, he reported none. He participates in exercise daily. His home blood glucose trend is increasing steadily. His breakfast blood glucose range is generally >200 mg/dl. An ACE inhibitor/angiotensin II receptor blocker is being taken. He does not see a podiatrist.Eye exam is not current. Hypertension  This is a chronic problem. The current episode started more than 1 year ago. The problem is unchanged. The problem is controlled.

## 2023-07-06 NOTE — PATIENT INSTRUCTIONS
Keep up with hydration. Offer frequently.   As long as stools are looser, avoid the milk.   Apply vaseline or aquaphor to the dry skin areas.   Make sure that all products you use on the skin have no extra scents or chemicals.     Please return for re-evaluation if you are noticing the rash is worsening, or skin is peeling, or rash is spreading into the mouth or other mucous.    SURVEY:     You may be receiving a survey from [employer] regarding your visit today. Please complete the survey to enable us to provide the highest quality of care to you and your family. If you cannot score us a very good on any question, please call the office to discuss how we could have made your experience a very good one.      Thank you,    April Fisher, APRN-GABRIELLA Barrios, APRN-CNP  Jahaira Juan, MARION  [facility], CMA  Neto Black, CMA  Aury, CMA  Dionna, PCA  Gladys, PM

## 2023-07-07 ENCOUNTER — CARE COORDINATION (OUTPATIENT)
Dept: CARE COORDINATION | Age: 68
End: 2023-07-07

## 2023-07-07 NOTE — CARE COORDINATION
ACC: Annette Mackenzie RN    Recieved patient chart from PCP referral for care coordination. See referral note below:   RAY Copeland - GABRIELLA Mackenzie, RN  We have reordered all of his medications. I would like him to take his fasting glucose and then 2 hours post largest meal daily. We will need to call these weekly with the care coordinator. We will adjust accordingly. CC outreach call placed to patient. Unable to reach Ami Bhatti. Call went straight to voicemail. Left a voicemail message requesting a return phone call back to this AC when patient is able to [phone], office hours given. Future Appointments   Date Time Provider [address] [place] [address]   8/31/2023  1:00 PM DO CINDY De León [facility] ENT CINDY Block   10/3/2023  2:40 PM Cee Fisher, RAY - CNP [facility]       Care Coordination Plan of Care:    This nurse Care Coordinator will  - await call back from patient, and if no return call; will attempt to reach patient back again next week   -will mail CC introduction letter to patient home

## 2023-07-12 ENCOUNTER — CARE COORDINATION (OUTPATIENT)
Dept: CARE COORDINATION | Age: 68
End: 2023-07-12

## 2023-07-12 NOTE — CARE COORDINATION
ACC: Nirmal Cobb RN    Recieved patient chart from PCP referral for care coordination. See referral note below:   Carli Thomasu, APRN - CNP  Nirmal Cobb, RN  We have reordered all of his medications. I would like him to take his fasting glucose and then 2 hours post largest meal daily. We will need to call these weekly with the care coordinator. We will adjust accordingly. CC outreach call placed to patient. Unable to reach [place]. Call went straight to voicemail. Left a voicemail message requesting a return phone call back to this [facility] when patient is able to [phone], office hours given. -2nd unsuccessful outreach attempt   -Letter was mailed at last outreach- no return call. Future Appointments   Date Time Provider [address] [place] [address]   8/31/2023  1:00 PM DO CINDY Atkins [facility] ENT AFL Cass   10/3/2023  2:40 PM Lynnette Fisher, APRN - CNP [facility]       Care Coordination Plan of Care:    This nurse Care Coordinator will  - await call back from patient, and if no return call; will attempt to reach patient back again next week for final attempt   -enroll into care coordination

## 2023-07-13 ENCOUNTER — CARE COORDINATION (OUTPATIENT)
Dept: CASE MANAGEMENT | Age: 68
End: 2023-07-13

## 2023-07-17 ENCOUNTER — CARE COORDINATION (OUTPATIENT)
Dept: CARE COORDINATION | Age: 68
End: 2023-07-17

## 2023-07-17 NOTE — CARE COORDINATION
ACC: Darylene Maidens, RN    Recieved patient chart from PCP referral for care coordination. See referral note below:   Marii Mauri, APRN - CNP  Darylene Maidens, RN  We have reordered all of his medications. I would like him to take his fasting glucose and then 2 hours post largest meal daily. We will need to call these weekly with the care coordinator. We will adjust accordingly. CC outreach call placed to patient. Unable to reach Mojgan Orourke. Call went straight to voicemail. Left a voicemail message requesting a return phone call back to this [facility] when patient is able to [phone], office hours given.     -3rd unsuccessful outreach attempt by [facility]   -Letter was mailed to patients home- no return call. Future Appointments   Date Time Provider [address] [place] [address]   8/31/2023  1:00 PM DO CINDY Floers [facility] ENT CINDY Bandera   10/3/2023  2:40 PM Corinne Fisher, APRN - CNP [facility]       Care Coordination Plan of Care:    This nurse Care Coordinator will  - laila care coordination exclusion due to unable to reach patient   -remove name from care team

## 2023-10-12 ENCOUNTER — TELEPHONE (OUTPATIENT)
Dept: PRIMARY CARE CLINIC | Age: 68
End: 2023-10-12

## 2023-10-17 ENCOUNTER — HOSPITAL ENCOUNTER (OUTPATIENT)
Age: 68
Discharge: HOME | End: 2023-10-17
Payer: MEDICARE

## 2023-10-17 DIAGNOSIS — E11.65 UNCONTROLLED TYPE 2 DIABETES MELLITUS WITH HYPERGLYCEMIA (HCC): ICD-10-CM

## 2023-10-17 DIAGNOSIS — Z12.5 SCREENING PSA (PROSTATE SPECIFIC ANTIGEN): ICD-10-CM

## 2023-10-17 DIAGNOSIS — R97.20 ELEVATED PSA: Primary | ICD-10-CM

## 2023-10-17 LAB
ALT SERPL-CCNC: 15 U/L (ref 5–41)
ANION GAP SERPL CALCULATED.3IONS-SCNC: 10 MMOL/L (ref 9–17)
AST SERPL-CCNC: 15 U/L
BASOPHILS # BLD: 0.03 K/UL (ref 0–0.2)
BASOPHILS NFR BLD: 0 % (ref 0–2)
BUN SERPL-MCNC: 24 MG/DL (ref 8–23)
BUN/CREAT SERPL: 17 (ref 9–20)
CALCIUM SERPL-MCNC: 9.5 MG/DL (ref 8.6–10.4)
CHLORIDE SERPL-SCNC: 97 MMOL/L (ref 98–107)
CHOLEST SERPL-MCNC: 131 MG/DL
CHOLESTEROL/HDL RATIO: 6.9
CO2 SERPL-SCNC: 27 MMOL/L (ref 20–31)
CREAT SERPL-MCNC: 1.4 MG/DL (ref 0.7–1.2)
CREAT UR-MCNC: 68 MG/DL (ref 39–259)
EOSINOPHIL # BLD: 0.21 K/UL (ref 0–0.44)
EOSINOPHILS RELATIVE PERCENT: 3 % (ref 1–4)
ERYTHROCYTE [DISTWIDTH] IN BLOOD BY AUTOMATED COUNT: 12.1 % (ref 11.8–14.4)
GFR SERPL CREATININE-BSD FRML MDRD: 56 ML/MIN/1.73M2
GLUCOSE SERPL-MCNC: 415 MG/DL (ref 70–99)
HCT VFR BLD AUTO: 46.2 % (ref 40.7–50.3)
HDLC SERPL-MCNC: 19 MG/DL
HGB BLD-MCNC: 15.8 G/DL (ref 13–17)
IMM GRANULOCYTES # BLD AUTO: 0.03 K/UL (ref 0–0.3)
IMM GRANULOCYTES NFR BLD: 0 %
LDLC SERPL CALC-MCNC: 54 MG/DL (ref 0–130)
LYMPHOCYTES NFR BLD: 2.12 K/UL (ref 1.1–3.7)
LYMPHOCYTES RELATIVE PERCENT: 25 % (ref 24–43)
MCH RBC QN AUTO: 30.2 PG (ref 25.2–33.5)
MCHC RBC AUTO-ENTMCNC: 34.2 G/DL (ref 28.4–34.8)
MCV RBC AUTO: 88.3 FL (ref 82.6–102.9)
MICROALBUMIN UR-MCNC: 96 MG/L
MICROALBUMIN/CREAT UR-RTO: 141 MCG/MG CREAT
MONOCYTES NFR BLD: 0.59 K/UL (ref 0.1–1.2)
MONOCYTES NFR BLD: 7 % (ref 3–12)
NEUTROPHILS NFR BLD: 65 % (ref 36–65)
NEUTS SEG NFR BLD: 5.41 K/UL (ref 1.5–8.1)
NRBC BLD-RTO: 0 PER 100 WBC
PLATELET # BLD AUTO: 199 K/UL (ref 138–453)
PMV BLD AUTO: 10.6 FL (ref 8.1–13.5)
POTASSIUM SERPL-SCNC: 4.7 MMOL/L (ref 3.7–5.3)
PSA SERPL-MCNC: 13.85 NG/ML
RBC # BLD AUTO: 5.23 M/UL (ref 4.21–5.77)
SODIUM SERPL-SCNC: 134 MMOL/L (ref 135–144)
TRIGL SERPL-MCNC: 289 MG/DL
WBC OTHER # BLD: 8.4 K/UL (ref 3.5–11.3)

## 2023-10-17 PROCEDURE — G0103 PSA SCREENING: HCPCS

## 2023-10-17 PROCEDURE — 83036 HEMOGLOBIN GLYCOSYLATED A1C: CPT

## 2023-10-17 PROCEDURE — 84460 ALANINE AMINO (ALT) (SGPT): CPT

## 2023-10-17 PROCEDURE — 84450 TRANSFERASE (AST) (SGOT): CPT

## 2023-10-17 PROCEDURE — 85025 COMPLETE CBC W/AUTO DIFF WBC: CPT

## 2023-10-17 PROCEDURE — 82570 ASSAY OF URINE CREATININE: CPT

## 2023-10-17 PROCEDURE — 82043 UR ALBUMIN QUANTITATIVE: CPT

## 2023-10-17 PROCEDURE — 80061 LIPID PANEL: CPT

## 2023-10-17 PROCEDURE — 80048 BASIC METABOLIC PNL TOTAL CA: CPT

## 2023-10-17 PROCEDURE — 36415 COLL VENOUS BLD VENIPUNCTURE: CPT

## 2023-10-18 ENCOUNTER — TELEPHONE (OUTPATIENT)
Dept: PRIMARY CARE CLINIC | Age: 68
End: 2023-10-18

## 2023-10-18 LAB
EST. AVERAGE GLUCOSE BLD GHB EST-MCNC: 292 MG/DL
HBA1C MFR BLD: 11.8 % (ref 4–6)

## 2023-10-18 NOTE — TELEPHONE ENCOUNTER
----- Message from [address] [facility], APRN - CNP sent at 10/17/2023  4:46 PM EDT -----  Blood sugars are very high. Make sure he has his medications? ?,  Also his PSA is elevated I have referred him to urology for evaluation. Thank you.

## 2023-10-18 NOTE — TELEPHONE ENCOUNTER
Patient notified and verbalized understanding. Patient went through each medication and they match everything in his chart. Patient was sick last week and did drink a 2 liter of Sprite and Ginger Ale and they were not diet. Patient wanted Kenia Barbone to know. Patient also wanted to let him know he is still losing weight. Please advise.

## 2023-10-19 ENCOUNTER — OFFICE VISIT (OUTPATIENT)
Dept: PRIMARY CARE CLINIC | Age: 68
End: 2023-10-19
Payer: MEDICARE

## 2023-10-19 VITALS
WEIGHT: 194.3 LBS | HEIGHT: 67 IN | SYSTOLIC BLOOD PRESSURE: 180 MMHG | OXYGEN SATURATION: 96 % | RESPIRATION RATE: 18 BRPM | DIASTOLIC BLOOD PRESSURE: 102 MMHG | BODY MASS INDEX: 30.49 KG/M2 | TEMPERATURE: 98.1 F | HEART RATE: 94 BPM

## 2023-10-19 DIAGNOSIS — E11.65 UNCONTROLLED TYPE 2 DIABETES MELLITUS WITH HYPERGLYCEMIA (HCC): ICD-10-CM

## 2023-10-19 DIAGNOSIS — Z00.00 WELCOME TO MEDICARE PREVENTIVE VISIT: Primary | ICD-10-CM

## 2023-10-19 DIAGNOSIS — E78.5 DYSLIPIDEMIA: ICD-10-CM

## 2023-10-19 DIAGNOSIS — I10 PRIMARY HYPERTENSION: ICD-10-CM

## 2023-10-19 PROCEDURE — 3017F COLORECTAL CA SCREEN DOC REV: CPT | Performed by: NURSE PRACTITIONER

## 2023-10-19 PROCEDURE — 3080F DIAST BP >= 90 MM HG: CPT | Performed by: NURSE PRACTITIONER

## 2023-10-19 PROCEDURE — 3077F SYST BP >= 140 MM HG: CPT | Performed by: NURSE PRACTITIONER

## 2023-10-19 PROCEDURE — G0402 INITIAL PREVENTIVE EXAM: HCPCS | Performed by: NURSE PRACTITIONER

## 2023-10-19 PROCEDURE — 1123F ACP DISCUSS/DSCN MKR DOCD: CPT | Performed by: NURSE PRACTITIONER

## 2023-10-19 PROCEDURE — 3046F HEMOGLOBIN A1C LEVEL >9.0%: CPT | Performed by: NURSE PRACTITIONER

## 2023-10-19 RX ORDER — DULOXETIN HYDROCHLORIDE 30 MG/1
CAPSULE, DELAYED RELEASE ORAL
Qty: 90 CAPSULE | Refills: 1 | Status: SHIPPED | OUTPATIENT
Start: 2023-10-19

## 2023-10-19 RX ORDER — INSULIN ASPART 100 [IU]/ML
10 INJECTION, SOLUTION INTRAVENOUS; SUBCUTANEOUS
Qty: 5 ADJUSTABLE DOSE PRE-FILLED PEN SYRINGE | Refills: 5 | Status: SHIPPED | OUTPATIENT
Start: 2023-10-19

## 2023-10-19 RX ORDER — MONTELUKAST SODIUM 10 MG/1
10 TABLET ORAL DAILY
Qty: 90 TABLET | Refills: 1 | Status: SHIPPED | OUTPATIENT
Start: 2023-10-19

## 2023-10-19 RX ORDER — LISINOPRIL 5 MG/1
TABLET ORAL
Qty: 90 TABLET | Refills: 1 | Status: SHIPPED | OUTPATIENT
Start: 2023-10-19

## 2023-10-19 RX ORDER — UBIDECARENONE 100 MG
1 CAPSULE ORAL DAILY
Qty: 90 CAPSULE | Refills: 1 | Status: SHIPPED | OUTPATIENT
Start: 2023-10-19

## 2023-10-19 RX ORDER — AMLODIPINE BESYLATE 5 MG/1
5 TABLET ORAL DAILY
Qty: 90 TABLET | Refills: 1 | Status: SHIPPED | OUTPATIENT
Start: 2023-10-19

## 2023-10-19 RX ORDER — INSULIN DEGLUDEC 200 U/ML
60 INJECTION, SOLUTION SUBCUTANEOUS DAILY
Qty: 9 ML | Refills: 5 | Status: SHIPPED | OUTPATIENT
Start: 2023-10-19

## 2023-10-19 RX ORDER — ASPIRIN 81 MG/1
81 TABLET ORAL DAILY
Qty: 100 TABLET | Refills: 1 | Status: SHIPPED | OUTPATIENT
Start: 2023-10-19

## 2023-10-19 RX ORDER — ATORVASTATIN CALCIUM 40 MG/1
40 TABLET, FILM COATED ORAL DAILY
Qty: 90 TABLET | Refills: 1 | Status: SHIPPED | OUTPATIENT
Start: 2023-10-19

## 2023-10-19 ASSESSMENT — PATIENT HEALTH QUESTIONNAIRE - PHQ9
SUM OF ALL RESPONSES TO PHQ QUESTIONS 1-9: 0
1. LITTLE INTEREST OR PLEASURE IN DOING THINGS: 0
SUM OF ALL RESPONSES TO PHQ QUESTIONS 1-9: 0
SUM OF ALL RESPONSES TO PHQ9 QUESTIONS 1 & 2: 0
SUM OF ALL RESPONSES TO PHQ QUESTIONS 1-9: 0
2. FEELING DOWN, DEPRESSED OR HOPELESS: 0
SUM OF ALL RESPONSES TO PHQ QUESTIONS 1-9: 0

## 2023-10-19 ASSESSMENT — ENCOUNTER SYMPTOMS
NAUSEA: 0
BLURRED VISION: 0
COUGH: 0
ABDOMINAL PAIN: 0
WHEEZING: 0
SORE THROAT: 0
VOMITING: 0
RHINORRHEA: 0
SHORTNESS OF BREATH: 0
DIARRHEA: 0
CONSTIPATION: 0

## 2023-10-19 NOTE — PATIENT INSTRUCTIONS
SURVEY:     You may be receiving a survey from [employer] regarding your visit today. Please complete the survey to enable us to provide the highest quality of care to you and your family. If you cannot score us a very good on any question, please call the office to discuss how we could have made your experience a very good one. Thank you,    Yovana Fisher, APRN-CNP  Nina Wyatte, APRN-CNP  Guillerminaradha Garcia, LPN  Jessica Ruiz, CMA  Jimena Lopez, CMA  Aury, CMA  Dionna, PCA  Gladys, PM         Substance Use Disorder: Care Instructions  Overview     You can improve your life and health by stopping your use of alcohol or drugs. When you don't drink or use drugs, you may feel and sleep better. You may get along better with your family, friends, and coworkers. There are medicines and programs that can help with substance use disorder. How can you care for yourself at home? If you have been given medicine to help keep you sober or reduce your cravings, be sure to take it exactly as prescribed. Talk to your doctor about programs that can help you stop using drugs or drinking alcohol. Do not keep alcohol or drugs in your home. Plan ahead. Think about what you'll say if other people ask you to drink or use drugs. Try not to spend time with people who drink or use drugs. Use the time and money spent on drinking or drugs to do something that's important to you. Preventing a relapse  Have a plan to deal with relapse. Learn to recognize changes in your thinking that lead you to drink or use drugs. Get help before you start to drink or use drugs again. Try to stay away from situations, friends, or places that may lead you to drink or use drugs. If you feel the need to drink alcohol or use drugs again, seek help right away. Call a trusted friend or family member. Some people get support from organizations such as Narcotics Anonymous or [employer] or from treatment facilities. If you relapse, get help as soon as you can.  Some

## 2023-10-23 ENCOUNTER — CARE COORDINATION (OUTPATIENT)
Dept: CARE COORDINATION | Age: 68
End: 2023-10-23

## 2023-11-08 ENCOUNTER — TELEPHONE (OUTPATIENT)
Dept: PHARMACY | Facility: CLINIC | Age: 68
End: 2023-11-08

## 2023-11-08 NOTE — TELEPHONE ENCOUNTER
POPULATION HEALTH CLINICAL PHARMACY: STATIN THERAPY REVIEW  [facility] Records dated: 10/4/23. STATIN GAP IDENTIFIED- Assessment      Per chart review:  atorvastatin 40 mg active in med list. Per med rec dispense  was filled [facility] this year at [facility] but most recent fill was sent to [facility] pharmacy    No Known Allergies  Lipid evaluation and Guideline Assessment      Lab Results   Component Value Date    CHOL 131 10/17/2023    TRIG 289 (H) 10/17/2023    HDL 19 (L) 10/17/2023    LDLCHOLESTEROL 54 10/17/2023    LDLDIRECT 57 11/10/2014     ALT   Date Value Ref Range Status   10/17/2023 15 5 - 41 U/L Final     AST   Date Value Ref Range Status   10/17/2023 15 <40 U/L Final     The ASCVD Risk score (Sasha GOMEZ, et al., 2019) failed to calculate for the following reasons: The valid HDL cholesterol range is 20 to 100 mg/dL      Per ADA 2023 Guidelines  the patient is a candidate for a high-intensity statin. PLAN  The following are interventions that have been identified:  - atorvastatin active in med list, most recent script sent to [facility]    Per pharmacy: atorvastatin 40 mg picked up 10/22/23 for 90 ds billed through [facility].      Inderjit Marie, PharmD, [facility] Pharmacist  Department: [phone]    For Pharmacy Admin Tracking Only    Program: [address] Closed?: Yes   Time Spent (min): 15

## 2023-11-09 ENCOUNTER — OFFICE VISIT (OUTPATIENT)
Dept: UROLOGY | Age: 68
End: 2023-11-09
Payer: MEDICARE

## 2023-11-09 VITALS
WEIGHT: 195 LBS | SYSTOLIC BLOOD PRESSURE: 145 MMHG | DIASTOLIC BLOOD PRESSURE: 96 MMHG | HEIGHT: 67 IN | TEMPERATURE: 97.7 F | RESPIRATION RATE: 18 BRPM | HEART RATE: 86 BPM | BODY MASS INDEX: 30.61 KG/M2

## 2023-11-09 DIAGNOSIS — R97.20 ELEVATED PSA: Primary | ICD-10-CM

## 2023-11-09 PROCEDURE — G8484 FLU IMMUNIZE NO ADMIN: HCPCS | Performed by: UROLOGY

## 2023-11-09 PROCEDURE — 3017F COLORECTAL CA SCREEN DOC REV: CPT | Performed by: UROLOGY

## 2023-11-09 PROCEDURE — 3080F DIAST BP >= 90 MM HG: CPT | Performed by: UROLOGY

## 2023-11-09 PROCEDURE — 4004F PT TOBACCO SCREEN RCVD TLK: CPT | Performed by: UROLOGY

## 2023-11-09 PROCEDURE — 1123F ACP DISCUSS/DSCN MKR DOCD: CPT | Performed by: UROLOGY

## 2023-11-09 PROCEDURE — 3077F SYST BP >= 140 MM HG: CPT | Performed by: UROLOGY

## 2023-11-09 PROCEDURE — 99204 OFFICE O/P NEW MOD 45 MIN: CPT | Performed by: UROLOGY

## 2023-11-09 PROCEDURE — G8417 CALC BMI ABV UP PARAM F/U: HCPCS | Performed by: UROLOGY

## 2023-11-09 PROCEDURE — G8427 DOCREV CUR MEDS BY ELIG CLIN: HCPCS | Performed by: UROLOGY

## 2023-11-09 RX ORDER — LEVOFLOXACIN 500 MG/1
500 TABLET, FILM COATED ORAL DAILY
Qty: 10 TABLET | Refills: 0 | Status: SHIPPED | OUTPATIENT
Start: 2023-11-09 | End: 2023-11-19

## 2023-11-09 ASSESSMENT — ENCOUNTER SYMPTOMS
COUGH: 0
SHORTNESS OF BREATH: 0
BACK PAIN: 0
COLOR CHANGE: 0
CONSTIPATION: 0
NAUSEA: 0
EYE REDNESS: 0
WHEEZING: 0
VOMITING: 0
ABDOMINAL PAIN: 0

## 2023-11-09 NOTE — PROGRESS NOTES
(1000 UT) CAPS Take 1 capsule by mouth daily 90 capsule 1    amLODIPine (NORVASC) 5 MG tablet Take 1 tablet by mouth daily 90 tablet 1    B-D ULTRAFINE III SHORT PEN 31G X 8 MM MISC USE 1 NEEDLE FIVE TIMES A  each 11    Probiotic Product (CULTURELLE PROBIOTICS) CHEW Take 1 each by mouth daily 90 tablet 3    blood glucose test strips (FREESTYLE LITE) strip 1 each by In Vitro route 3 times daily As needed. 100 each 11    FreeStyle Lancets MISC 1 each by Does not apply route 3 times daily 100 each 11    glucose monitoring kit (FREESTYLE) monitoring kit 1 kit by Does not apply route daily 1 kit 0    Alcohol Swabs (EASY TOUCH ALCOHOL PREP MEDIUM) 70 % PADS USE TO TEST TWICE DAILY  11     No current facility-administered medications on file prior to visit. Patient has no known allergies. Family History   Problem Relation Age of Onset    Diabetes Mother     Heart Disease Father     Diabetes Brother     Diabetes Brother     Stroke Other     Diabetes Other     Arthritis Other      Social History     Tobacco Use   Smoking Status Every Day    Packs/day: 0.00    Years: 25.00    Additional pack years: 0.00    Total pack years: 0.00    Types: Cigars, Cigarettes    Last attempt to quit: 10/2/2015    Years since quittin.1   Smokeless Tobacco Never   Tobacco Comments    trying to quit, smokes little cigars       Social History     Substance and Sexual Activity   Alcohol Use No       Review of Systems   Constitutional:  Negative for appetite change, chills and fever. Eyes:  Negative for redness and visual disturbance. Respiratory:  Negative for cough, shortness of breath and wheezing. Cardiovascular:  Negative for chest pain and leg swelling. Gastrointestinal:  Negative for abdominal pain, constipation, nausea and vomiting.    Genitourinary:  Negative for decreased urine volume, difficulty urinating, dysuria, enuresis, flank pain, frequency, hematuria, penile discharge, penile pain, scrotal swelling,

## 2024-02-12 ENCOUNTER — TELEPHONE (OUTPATIENT)
Dept: UROLOGY | Age: 69
End: 2024-02-12

## 2024-02-12 ENCOUNTER — HOSPITAL ENCOUNTER (OUTPATIENT)
Age: 69
Discharge: HOME | End: 2024-02-12
Payer: MEDICARE

## 2024-02-12 DIAGNOSIS — R97.20 ELEVATED PSA: ICD-10-CM

## 2024-02-12 LAB — PSA SERPL-MCNC: 11.1 NG/ML (ref 0–4)

## 2024-02-12 PROCEDURE — 84153 ASSAY OF PSA TOTAL: CPT

## 2024-02-12 PROCEDURE — 36415 COLL VENOUS BLD VENIPUNCTURE: CPT

## 2024-02-12 NOTE — TELEPHONE ENCOUNTER
Writer called patient to schedule appointment. Patient asked if he could be seen in [facility]. This would be easier for him. Patient stated he can only come to [place] when he has gas money.

## 2024-02-12 NOTE — TELEPHONE ENCOUNTER
----- Message from RAY Knowles CNP sent at 2/12/2024  3:36 PM EST -----  Needs apt within the next 1-2 weeks

## 2024-02-13 ENCOUNTER — OFFICE VISIT (OUTPATIENT)
Dept: PRIMARY CARE CLINIC | Age: 69
End: 2024-02-13
Payer: MEDICARE

## 2024-02-13 VITALS
RESPIRATION RATE: 20 BRPM | WEIGHT: 198.5 LBS | OXYGEN SATURATION: 97 % | BODY MASS INDEX: 31.09 KG/M2 | DIASTOLIC BLOOD PRESSURE: 88 MMHG | HEART RATE: 84 BPM | TEMPERATURE: 98.5 F | SYSTOLIC BLOOD PRESSURE: 138 MMHG

## 2024-02-13 DIAGNOSIS — E78.5 DYSLIPIDEMIA: Chronic | ICD-10-CM

## 2024-02-13 DIAGNOSIS — E11.65 UNCONTROLLED TYPE 2 DIABETES MELLITUS WITH HYPERGLYCEMIA (HCC): Primary | ICD-10-CM

## 2024-02-13 PROBLEM — E11.42 DIABETIC POLYNEUROPATHY ASSOCIATED WITH TYPE 2 DIABETES MELLITUS (HCC): Status: RESOLVED | Noted: 2018-04-21 | Resolved: 2024-02-13

## 2024-02-13 LAB — HBA1C MFR BLD: 9.6 %

## 2024-02-13 PROCEDURE — 3079F DIAST BP 80-89 MM HG: CPT | Performed by: NURSE PRACTITIONER

## 2024-02-13 PROCEDURE — 99214 OFFICE O/P EST MOD 30 MIN: CPT | Performed by: NURSE PRACTITIONER

## 2024-02-13 PROCEDURE — 3046F HEMOGLOBIN A1C LEVEL >9.0%: CPT | Performed by: NURSE PRACTITIONER

## 2024-02-13 PROCEDURE — 83036 HEMOGLOBIN GLYCOSYLATED A1C: CPT | Performed by: NURSE PRACTITIONER

## 2024-02-13 PROCEDURE — 3075F SYST BP GE 130 - 139MM HG: CPT | Performed by: NURSE PRACTITIONER

## 2024-02-13 PROCEDURE — 1123F ACP DISCUSS/DSCN MKR DOCD: CPT | Performed by: NURSE PRACTITIONER

## 2024-02-13 RX ORDER — PEN NEEDLE, DIABETIC 31 GX5/16"
NEEDLE, DISPOSABLE MISCELLANEOUS
Qty: 100 EACH | Refills: 11 | Status: SHIPPED | OUTPATIENT
Start: 2024-02-13

## 2024-02-13 RX ORDER — INSULIN DEGLUDEC 200 U/ML
70 INJECTION, SOLUTION SUBCUTANEOUS DAILY
Qty: 9 ML | Refills: 5 | Status: SHIPPED | OUTPATIENT
Start: 2024-02-13

## 2024-02-13 NOTE — PROGRESS NOTES
Name: Yasmany Alcantara  : 1955         Chief Complaint:     Chief Complaint   Patient presents with    Diabetes     Routine check.    Hyperlipidemia       History of Present Illness:      Yasmany Alcantara is a 68 y.o.  male who presents with Diabetes (Routine check.) and Hyperlipidemia      Yasmany is here today for a routine office visit.    DM- improving, patient states he has been taking his medications. His A1c is 9.2 from 11.8 3 months ago. He is enrolled in care coordination to help manage appointment, medication, pharmacy issues.    Diabetes  He presents for his follow-up diabetic visit. He has type 2 diabetes mellitus. His disease course has been improving. There are no hypoglycemic associated symptoms. Pertinent negatives for hypoglycemia include no dizziness or headaches. Associated symptoms include foot paresthesias. Pertinent negatives for diabetes include no blurred vision, no chest pain, no fatigue, no polydipsia, no polyphagia and no polyuria. There are no hypoglycemic complications. Symptoms are stable. Diabetic complications include a CVA, nephropathy and peripheral neuropathy. Pertinent negatives for diabetic complications include no heart disease. Risk factors for coronary artery disease include diabetes mellitus, dyslipidemia, hypertension and male sex. Current diabetic treatment includes oral agent (dual therapy) and insulin injections. He is compliant with treatment all of the time. His weight is stable. He is following a generally healthy diet. When asked about meal planning, he reported none. He participates in exercise daily. His home blood glucose trend is decreasing steadily. His breakfast blood glucose range is generally >200 mg/dl. An ACE inhibitor/angiotensin II receptor blocker is being taken. He does not see a podiatrist.Eye exam is not current.   Hypertension  This is a chronic problem. The current episode started more than 1 year ago. The problem has been gradually worsening

## 2024-05-07 ENCOUNTER — TELEPHONE (OUTPATIENT)
Dept: PRIMARY CARE CLINIC | Age: 69
End: 2024-05-07

## 2024-05-13 ENCOUNTER — HOSPITAL ENCOUNTER (OUTPATIENT)
Age: 69
Discharge: HOME | End: 2024-05-13
Payer: MEDICARE

## 2024-05-13 DIAGNOSIS — E11.65 UNCONTROLLED TYPE 2 DIABETES MELLITUS WITH HYPERGLYCEMIA (HCC): ICD-10-CM

## 2024-05-13 LAB
ALT SERPL-CCNC: 18 U/L (ref 5–41)
ANION GAP SERPL CALCULATED.3IONS-SCNC: 9 MMOL/L (ref 9–17)
AST SERPL-CCNC: 18 U/L
BASOPHILS # BLD: 0.05 K/UL (ref 0–0.2)
BASOPHILS NFR BLD: 1 % (ref 0–2)
BUN SERPL-MCNC: 29 MG/DL (ref 8–23)
BUN/CREAT SERPL: 21 (ref 9–20)
CALCIUM SERPL-MCNC: 10 MG/DL (ref 8.6–10.4)
CHLORIDE SERPL-SCNC: 103 MMOL/L (ref 98–107)
CHOLEST SERPL-MCNC: 123 MG/DL (ref 0–199)
CHOLESTEROL/HDL RATIO: 5
CO2 SERPL-SCNC: 28 MMOL/L (ref 20–31)
CREAT SERPL-MCNC: 1.4 MG/DL (ref 0.7–1.2)
CREAT UR-MCNC: 98 MG/DL (ref 39–259)
EOSINOPHIL # BLD: 0.17 K/UL (ref 0–0.44)
EOSINOPHILS RELATIVE PERCENT: 2 % (ref 1–4)
ERYTHROCYTE [DISTWIDTH] IN BLOOD BY AUTOMATED COUNT: 12.8 % (ref 11.8–14.4)
EST. AVERAGE GLUCOSE BLD GHB EST-MCNC: 235 MG/DL
GFR, ESTIMATED: 55 ML/MIN/1.73M2
GLUCOSE SERPL-MCNC: 244 MG/DL (ref 70–99)
HBA1C MFR BLD: 9.8 % (ref 4–6)
HCT VFR BLD AUTO: 45.9 % (ref 40.7–50.3)
HDLC SERPL-MCNC: 25 MG/DL
HGB BLD-MCNC: 15.1 G/DL (ref 13–17)
IMM GRANULOCYTES # BLD AUTO: 0.05 K/UL (ref 0–0.3)
IMM GRANULOCYTES NFR BLD: 1 %
LDLC SERPL CALC-MCNC: 64 MG/DL (ref 0–100)
LYMPHOCYTES NFR BLD: 2.09 K/UL (ref 1.1–3.7)
LYMPHOCYTES RELATIVE PERCENT: 23 % (ref 24–43)
MCH RBC QN AUTO: 30.4 PG (ref 25.2–33.5)
MCHC RBC AUTO-ENTMCNC: 32.9 G/DL (ref 28.4–34.8)
MCV RBC AUTO: 92.4 FL (ref 82.6–102.9)
MICROALBUMIN UR-MCNC: 21 MG/L (ref 0–20)
MICROALBUMIN/CREAT UR-RTO: 21 MCG/MG CREAT (ref 0–17)
MONOCYTES NFR BLD: 0.64 K/UL (ref 0.1–1.2)
MONOCYTES NFR BLD: 7 % (ref 3–12)
NEUTROPHILS NFR BLD: 66 % (ref 36–65)
NEUTS SEG NFR BLD: 6.15 K/UL (ref 1.5–8.1)
NRBC BLD-RTO: 0 PER 100 WBC
PLATELET # BLD AUTO: 205 K/UL (ref 138–453)
PMV BLD AUTO: 11 FL (ref 8.1–13.5)
POTASSIUM SERPL-SCNC: 4.8 MMOL/L (ref 3.7–5.3)
RBC # BLD AUTO: 4.97 M/UL (ref 4.21–5.77)
SODIUM SERPL-SCNC: 140 MMOL/L (ref 135–144)
TRIGL SERPL-MCNC: 174 MG/DL
VLDLC SERPL CALC-MCNC: 35 MG/DL
WBC OTHER # BLD: 9.2 K/UL (ref 3.5–11.3)

## 2024-05-13 PROCEDURE — 82043 UR ALBUMIN QUANTITATIVE: CPT

## 2024-05-13 PROCEDURE — 80048 BASIC METABOLIC PNL TOTAL CA: CPT

## 2024-05-13 PROCEDURE — 80061 LIPID PANEL: CPT

## 2024-05-13 PROCEDURE — 84450 TRANSFERASE (AST) (SGOT): CPT

## 2024-05-13 PROCEDURE — 85025 COMPLETE CBC W/AUTO DIFF WBC: CPT

## 2024-05-13 PROCEDURE — 82570 ASSAY OF URINE CREATININE: CPT

## 2024-05-13 PROCEDURE — 36415 COLL VENOUS BLD VENIPUNCTURE: CPT

## 2024-05-13 PROCEDURE — 83036 HEMOGLOBIN GLYCOSYLATED A1C: CPT

## 2024-05-13 PROCEDURE — 84460 ALANINE AMINO (ALT) (SGPT): CPT

## 2024-05-14 ENCOUNTER — OFFICE VISIT (OUTPATIENT)
Dept: PRIMARY CARE CLINIC | Age: 69
End: 2024-05-14
Payer: MEDICARE

## 2024-05-14 VITALS
SYSTOLIC BLOOD PRESSURE: 132 MMHG | TEMPERATURE: 98.5 F | WEIGHT: 196.7 LBS | RESPIRATION RATE: 20 BRPM | BODY MASS INDEX: 30.81 KG/M2 | OXYGEN SATURATION: 97 % | HEART RATE: 88 BPM | DIASTOLIC BLOOD PRESSURE: 82 MMHG

## 2024-05-14 DIAGNOSIS — I10 PRIMARY HYPERTENSION: ICD-10-CM

## 2024-05-14 DIAGNOSIS — N18.31 STAGE 3A CHRONIC KIDNEY DISEASE (HCC): ICD-10-CM

## 2024-05-14 DIAGNOSIS — E11.65 UNCONTROLLED TYPE 2 DIABETES MELLITUS WITH HYPERGLYCEMIA (HCC): Primary | ICD-10-CM

## 2024-05-14 PROCEDURE — 4004F PT TOBACCO SCREEN RCVD TLK: CPT | Performed by: NURSE PRACTITIONER

## 2024-05-14 PROCEDURE — 99214 OFFICE O/P EST MOD 30 MIN: CPT | Performed by: NURSE PRACTITIONER

## 2024-05-14 PROCEDURE — G8427 DOCREV CUR MEDS BY ELIG CLIN: HCPCS | Performed by: NURSE PRACTITIONER

## 2024-05-14 PROCEDURE — 3079F DIAST BP 80-89 MM HG: CPT | Performed by: NURSE PRACTITIONER

## 2024-05-14 PROCEDURE — G8417 CALC BMI ABV UP PARAM F/U: HCPCS | Performed by: NURSE PRACTITIONER

## 2024-05-14 PROCEDURE — 2022F DILAT RTA XM EVC RTNOPTHY: CPT | Performed by: NURSE PRACTITIONER

## 2024-05-14 PROCEDURE — 1123F ACP DISCUSS/DSCN MKR DOCD: CPT | Performed by: NURSE PRACTITIONER

## 2024-05-14 PROCEDURE — 3075F SYST BP GE 130 - 139MM HG: CPT | Performed by: NURSE PRACTITIONER

## 2024-05-14 PROCEDURE — 3046F HEMOGLOBIN A1C LEVEL >9.0%: CPT | Performed by: NURSE PRACTITIONER

## 2024-05-14 PROCEDURE — 3017F COLORECTAL CA SCREEN DOC REV: CPT | Performed by: NURSE PRACTITIONER

## 2024-05-14 SDOH — ECONOMIC STABILITY: FOOD INSECURITY: WITHIN THE PAST 12 MONTHS, THE FOOD YOU BOUGHT JUST DIDN'T LAST AND YOU DIDN'T HAVE MONEY TO GET MORE.: PATIENT DECLINED

## 2024-05-14 SDOH — ECONOMIC STABILITY: FOOD INSECURITY: WITHIN THE PAST 12 MONTHS, YOU WORRIED THAT YOUR FOOD WOULD RUN OUT BEFORE YOU GOT MONEY TO BUY MORE.: PATIENT DECLINED

## 2024-05-14 SDOH — ECONOMIC STABILITY: HOUSING INSECURITY
IN THE LAST 12 MONTHS, WAS THERE A TIME WHEN YOU DID NOT HAVE A STEADY PLACE TO SLEEP OR SLEPT IN A SHELTER (INCLUDING NOW)?: PATIENT DECLINED

## 2024-05-14 SDOH — ECONOMIC STABILITY: INCOME INSECURITY: HOW HARD IS IT FOR YOU TO PAY FOR THE VERY BASICS LIKE FOOD, HOUSING, MEDICAL CARE, AND HEATING?: PATIENT DECLINED

## 2024-05-14 ASSESSMENT — PATIENT HEALTH QUESTIONNAIRE - PHQ9
SUM OF ALL RESPONSES TO PHQ9 QUESTIONS 1 & 2: 0
2. FEELING DOWN, DEPRESSED OR HOPELESS: NOT AT ALL
SUM OF ALL RESPONSES TO PHQ QUESTIONS 1-9: 0
SUM OF ALL RESPONSES TO PHQ QUESTIONS 1-9: 0
1. LITTLE INTEREST OR PLEASURE IN DOING THINGS: NOT AT ALL
SUM OF ALL RESPONSES TO PHQ QUESTIONS 1-9: 0
SUM OF ALL RESPONSES TO PHQ QUESTIONS 1-9: 0

## 2024-05-14 ASSESSMENT — ENCOUNTER SYMPTOMS
ABDOMINAL PAIN: 0
SHORTNESS OF BREATH: 0
RHINORRHEA: 0
NAUSEA: 0
WHEEZING: 0
DIARRHEA: 0
VOMITING: 0
BLURRED VISION: 0
SORE THROAT: 0
COUGH: 0

## 2024-05-14 NOTE — PATIENT INSTRUCTIONS
[facility] Housing Resources*  (Call 211 if need more resources)    Area Office on Aging of [facility] ([facility]):  What they offer: Medical cab rides for seniors and referral to community resources  Phone Number: [phone]     [facility] Agency on Aging, District 5:    [place], [place], [place], [place], [place], [place], [place], [place], [facility]:  What they offer: Referral to transportation and other resources for seniors.  Phone Number: [phone]     [facility] Community Action Commission (NOCAC):   [place], Giuseppe Ovalle [place], Gerry Hu, and Faisal counnorma  What they offer: referral to community transportation and other resources.  Phone Number: 524.526.1447    [employer]:  What they offer: Emergency and rental assistance  Phone Number: (386) 672-2805      [employer]  What they offer: Transitional housing and shelter for women  Phone Number: (415) 887-7233      [facility]  What they offer: Homeless shelter for homeless men and women with children.  Phone number: 1-582.675.8097  Address: [address] [place] [address]. [facility]:  What they Offer: Homeless shelter for families & Pregnant women  Phone Number: (440) 159-6079 ext: [address] [place] [address]   What they offer: Homeless shelter for men.  Phone number: 107.168.6711  Address: [address] [place] [address]:  What they Offer: Homeless Permanent Supportive Housing for Women with Chronic/Severe Mental Illness, Physical Disabilities, and Substance Abuse Issues  Phone Number: (454) 916-4652    Website: www.ywcanwo.org    [facility]:  What they offer: Ex-Offender [place] [employer]  Phone Number: (716) 913-1565  Website: www.voago.org    [facility] [employer] [facility] ([facility]):  What they offer: Advocacy services for renters and homeowners.  Phone Number: 321.711.5646    [facility]):  What they offer: Shelter and advocacy services for victims of domestic

## 2024-05-14 NOTE — PROGRESS NOTES
Name: Yasmany Alcantara  : 1955         Chief Complaint:     Chief Complaint   Patient presents with    Diabetes     3 month check. No concerns.    Hypertension       History of Present Illness:      Yasmany Alcantara is a 68 y.o.  male who presents with Diabetes (3 month check. No concerns.) and Hypertension      Yasmany is here today for a routine office visit.    DM-unfortunately A1c has not really improved since last visit.  He states he did have some trouble with his medications again.  He will be using a delivery service when these current prescriptions are up.  He states he is eating much better now.    Diabetes  He presents for his follow-up diabetic visit. He has type 2 diabetes mellitus. His disease course has been stable. There are no hypoglycemic associated symptoms. Pertinent negatives for hypoglycemia include no dizziness or headaches. Associated symptoms include foot paresthesias. Pertinent negatives for diabetes include no blurred vision, no chest pain, no fatigue, no polydipsia, no polyphagia and no polyuria. There are no hypoglycemic complications. Symptoms are stable. Diabetic complications include a CVA, nephropathy and peripheral neuropathy. Pertinent negatives for diabetic complications include no heart disease. Risk factors for coronary artery disease include diabetes mellitus, dyslipidemia, hypertension and male sex. Current diabetic treatment includes oral agent (dual therapy) and insulin injections. He is compliant with treatment all of the time. His weight is stable. He is following a generally healthy diet. When asked about meal planning, he reported none. He participates in exercise daily. There is no change in his home blood glucose trend. His breakfast blood glucose range is generally >200 mg/dl. An ACE inhibitor/angiotensin II receptor blocker is being taken. He does not see a podiatrist.Eye exam is not current.   Hypertension  This is a chronic problem. The current episode started

## 2024-05-16 ENCOUNTER — CARE COORDINATION (OUTPATIENT)
Dept: CARE COORDINATION | Age: 69
End: 2024-05-16

## 2024-05-17 ENCOUNTER — ENROLLMENT (OUTPATIENT)
Dept: CARE COORDINATION | Age: 69
End: 2024-05-17

## 2024-06-04 ENCOUNTER — CARE COORDINATION (OUTPATIENT)
Dept: CARE COORDINATION | Age: 69
End: 2024-06-04

## 2024-06-05 NOTE — CARE COORDINATION
Ambulatory Care Coordination Note  6/5/2024    Received referral for care coordination from PCP referral. See referral note below:     Dwayne Fisher, RAY - Shi Severino, RN  Please enroll Yasmany in care coordination  Would like him to report daily fastings and 2-hour post largest meal at least 4 2 to 3 weeks.  Thank you.          CC outreach call placed to patient. Unable to reach Yasmany. Left a voicemail message requesting a return phone call back to this AC when patient is able to [phone], office hours given.     -2nd unsuccessful outreach attempt by writer      Future Appointments   Date Time Provider Department Center   8/14/2024  2:00 PM Dwayne Fisher, RAY - CNP [facility]

## 2024-06-11 ENCOUNTER — CARE COORDINATION (OUTPATIENT)
Dept: CARE COORDINATION | Age: 69
End: 2024-06-11

## 2024-06-11 NOTE — CARE COORDINATION
Ambulatory Care Coordination Note     6/11/2024 2:43 PM     patient outreach attempt by this ACM today to offer care management services. ACM was unable to reach the patient by telephone today; left voice message requesting a return phone call to this ACM.     Patient closed (unable to reach patient) from the High Risk Care Management program on 6/11/2024.    -3rd unsuccessful outreach attempt by writer      Future Appointments   Date Time Provider Department Center   8/14/2024  2:00 PM Dwayne Fisher, APRN - CNP [facility]

## 2024-08-14 ENCOUNTER — OFFICE VISIT (OUTPATIENT)
Dept: PRIMARY CARE CLINIC | Age: 69
End: 2024-08-14
Payer: MEDICARE

## 2024-08-14 VITALS
OXYGEN SATURATION: 96 % | TEMPERATURE: 98.9 F | HEART RATE: 104 BPM | WEIGHT: 186.8 LBS | BODY MASS INDEX: 29.26 KG/M2 | SYSTOLIC BLOOD PRESSURE: 138 MMHG | DIASTOLIC BLOOD PRESSURE: 80 MMHG

## 2024-08-14 DIAGNOSIS — I10 PRIMARY HYPERTENSION: ICD-10-CM

## 2024-08-14 DIAGNOSIS — E78.5 DYSLIPIDEMIA: ICD-10-CM

## 2024-08-14 DIAGNOSIS — E11.65 UNCONTROLLED TYPE 2 DIABETES MELLITUS WITH HYPERGLYCEMIA (HCC): Primary | ICD-10-CM

## 2024-08-14 LAB — HBA1C MFR BLD: 9.8 %

## 2024-08-14 PROCEDURE — 3075F SYST BP GE 130 - 139MM HG: CPT | Performed by: NURSE PRACTITIONER

## 2024-08-14 PROCEDURE — 3079F DIAST BP 80-89 MM HG: CPT | Performed by: NURSE PRACTITIONER

## 2024-08-14 PROCEDURE — G8417 CALC BMI ABV UP PARAM F/U: HCPCS | Performed by: NURSE PRACTITIONER

## 2024-08-14 PROCEDURE — 83036 HEMOGLOBIN GLYCOSYLATED A1C: CPT | Performed by: NURSE PRACTITIONER

## 2024-08-14 PROCEDURE — 1123F ACP DISCUSS/DSCN MKR DOCD: CPT | Performed by: NURSE PRACTITIONER

## 2024-08-14 PROCEDURE — 3017F COLORECTAL CA SCREEN DOC REV: CPT | Performed by: NURSE PRACTITIONER

## 2024-08-14 PROCEDURE — 3046F HEMOGLOBIN A1C LEVEL >9.0%: CPT | Performed by: NURSE PRACTITIONER

## 2024-08-14 PROCEDURE — G8427 DOCREV CUR MEDS BY ELIG CLIN: HCPCS | Performed by: NURSE PRACTITIONER

## 2024-08-14 PROCEDURE — 99214 OFFICE O/P EST MOD 30 MIN: CPT | Performed by: NURSE PRACTITIONER

## 2024-08-14 PROCEDURE — 2022F DILAT RTA XM EVC RTNOPTHY: CPT | Performed by: NURSE PRACTITIONER

## 2024-08-14 PROCEDURE — 4004F PT TOBACCO SCREEN RCVD TLK: CPT | Performed by: NURSE PRACTITIONER

## 2024-08-14 RX ORDER — INSULIN DEGLUDEC 200 U/ML
70 INJECTION, SOLUTION SUBCUTANEOUS DAILY
Qty: 9 ML | Refills: 5 | Status: SHIPPED | OUTPATIENT
Start: 2024-08-14

## 2024-08-14 RX ORDER — DULOXETIN HYDROCHLORIDE 30 MG/1
CAPSULE, DELAYED RELEASE ORAL
Qty: 90 CAPSULE | Refills: 1 | Status: SHIPPED | OUTPATIENT
Start: 2024-08-14

## 2024-08-14 RX ORDER — ATORVASTATIN CALCIUM 40 MG/1
40 TABLET, FILM COATED ORAL DAILY
Qty: 90 TABLET | Refills: 1 | Status: SHIPPED | OUTPATIENT
Start: 2024-08-14

## 2024-08-14 RX ORDER — AMLODIPINE BESYLATE 5 MG/1
5 TABLET ORAL DAILY
Qty: 90 TABLET | Refills: 1 | Status: SHIPPED | OUTPATIENT
Start: 2024-08-14

## 2024-08-14 RX ORDER — LISINOPRIL 5 MG/1
TABLET ORAL
Qty: 90 TABLET | Refills: 1 | Status: SHIPPED | OUTPATIENT
Start: 2024-08-14

## 2024-08-14 RX ORDER — MONTELUKAST SODIUM 10 MG/1
10 TABLET ORAL DAILY
Qty: 90 TABLET | Refills: 1 | Status: SHIPPED | OUTPATIENT
Start: 2024-08-14

## 2024-08-14 RX ORDER — INSULIN ASPART 100 [IU]/ML
10 INJECTION, SOLUTION INTRAVENOUS; SUBCUTANEOUS
Qty: 5 ADJUSTABLE DOSE PRE-FILLED PEN SYRINGE | Refills: 5 | Status: SHIPPED | OUTPATIENT
Start: 2024-08-14

## 2024-08-14 ASSESSMENT — ENCOUNTER SYMPTOMS
ABDOMINAL PAIN: 0
BLURRED VISION: 0
CONSTIPATION: 0
WHEEZING: 0
SHORTNESS OF BREATH: 0
COUGH: 0
DIARRHEA: 0
SORE THROAT: 0
VOMITING: 0
RHINORRHEA: 0
NAUSEA: 0

## 2024-08-14 NOTE — PATIENT INSTRUCTIONS
SURVEY:     You may be receiving a survey from [facility] [employer] regarding your visit today.     Please complete the survey to enable us to provide the highest quality of care to you and your family.     If you cannot score us a very good on any question, please call the office to discuss how we could have made your experience a very good one.     Thank you,    Dwayne Fisher, APRN-CNP  April Mathis, APRN-CNP  Ro, LPN  Marnie, CMA  Caleb, CMA  Aury, CMA  Dionna, PCA  Soledad, CMA  Gladys, PM

## 2024-08-14 NOTE — PROGRESS NOTES
Name: Yasmany Alcantara  : 1955         Chief Complaint:     Chief Complaint   Patient presents with    Diabetes     Patient is here for 3 month check.    Hypertension       History of Present Illness:      Yasmany Alcantara is a 68 y.o.  male who presents with Diabetes (Patient is here for 3 month check.) and Hypertension      Yasmany is here today for a routine office visit.    DM-unfortunately his A1c is not controlled.  He is over 9.  Is unclear whether he is taking his medications routinely or not.  He states that he is but he stops intermittently when he feels better.  Refill records show modest compliance.  See below for further comments.    Diabetes  He presents for his follow-up diabetic visit. He has type 2 diabetes mellitus. His disease course has been improving. There are no hypoglycemic associated symptoms. Pertinent negatives for hypoglycemia include no dizziness or headaches. Associated symptoms include foot paresthesias. Pertinent negatives for diabetes include no blurred vision, no chest pain, no fatigue, no polydipsia, no polyphagia and no polyuria. There are no hypoglycemic complications. Symptoms are stable. Diabetic complications include a CVA, nephropathy and peripheral neuropathy. Pertinent negatives for diabetic complications include no heart disease. Risk factors for coronary artery disease include diabetes mellitus, dyslipidemia, hypertension and male sex. Current diabetic treatment includes oral agent (dual therapy) and insulin injections. He is compliant with treatment all of the time. His weight is stable. He is following a generally healthy diet. When asked about meal planning, he reported none. He participates in exercise daily. His home blood glucose trend is decreasing steadily. His breakfast blood glucose range is generally >200 mg/dl. An ACE inhibitor/angiotensin II receptor blocker is being taken. He does not see a podiatrist.Eye exam is not current.   Hypertension  This is a

## 2024-10-29 ENCOUNTER — TELEPHONE (OUTPATIENT)
Dept: PRIMARY CARE CLINIC | Age: 69
End: 2024-10-29

## 2024-11-01 ENCOUNTER — HOSPITAL ENCOUNTER (OUTPATIENT)
Age: 69
Discharge: HOME | End: 2024-11-01
Payer: MEDICARE

## 2024-11-01 DIAGNOSIS — E11.65 UNCONTROLLED TYPE 2 DIABETES MELLITUS WITH HYPERGLYCEMIA (HCC): ICD-10-CM

## 2024-11-01 LAB
ANION GAP SERPL CALCULATED.3IONS-SCNC: 14 MMOL/L (ref 9–16)
BASOPHILS # BLD: 0.05 K/UL (ref 0–0.2)
BASOPHILS NFR BLD: 0 % (ref 0–2)
BUN SERPL-MCNC: 16 MG/DL (ref 8–23)
BUN/CREAT SERPL: 13 (ref 9–20)
CALCIUM SERPL-MCNC: 10 MG/DL (ref 8.6–10.4)
CHLORIDE SERPL-SCNC: 105 MMOL/L (ref 98–107)
CO2 SERPL-SCNC: 23 MMOL/L (ref 20–31)
CREAT SERPL-MCNC: 1.2 MG/DL (ref 0.7–1.2)
EOSINOPHIL # BLD: 0.1 K/UL (ref 0–0.44)
EOSINOPHILS RELATIVE PERCENT: 1 % (ref 1–4)
ERYTHROCYTE [DISTWIDTH] IN BLOOD BY AUTOMATED COUNT: 12.6 % (ref 11.8–14.4)
EST. AVERAGE GLUCOSE BLD GHB EST-MCNC: 243 MG/DL
GFR, ESTIMATED: 64 ML/MIN/1.73M2
GLUCOSE SERPL-MCNC: 204 MG/DL (ref 74–99)
HBA1C MFR BLD: 10.1 % (ref 4–6)
HCT VFR BLD AUTO: 48.2 % (ref 40.7–50.3)
HGB BLD-MCNC: 16.1 G/DL (ref 13–17)
IMM GRANULOCYTES # BLD AUTO: 0.04 K/UL (ref 0–0.3)
IMM GRANULOCYTES NFR BLD: 0 %
LYMPHOCYTES NFR BLD: 2.68 K/UL (ref 1.1–3.7)
LYMPHOCYTES RELATIVE PERCENT: 23 % (ref 24–43)
MCH RBC QN AUTO: 30.5 PG (ref 25.2–33.5)
MCHC RBC AUTO-ENTMCNC: 33.4 G/DL (ref 28.4–34.8)
MCV RBC AUTO: 91.3 FL (ref 82.6–102.9)
MONOCYTES NFR BLD: 0.63 K/UL (ref 0.1–1.2)
MONOCYTES NFR BLD: 5 % (ref 3–12)
NEUTROPHILS NFR BLD: 71 % (ref 36–65)
NEUTS SEG NFR BLD: 8.2 K/UL (ref 1.5–8.1)
NRBC BLD-RTO: 0 PER 100 WBC
PLATELET # BLD AUTO: 250 K/UL (ref 138–453)
PMV BLD AUTO: 10.6 FL (ref 8.1–13.5)
POTASSIUM SERPL-SCNC: 4.5 MMOL/L (ref 3.7–5.3)
RBC # BLD AUTO: 5.28 M/UL (ref 4.21–5.77)
SODIUM SERPL-SCNC: 142 MMOL/L (ref 136–145)
WBC OTHER # BLD: 11.7 K/UL (ref 3.5–11.3)

## 2024-11-01 PROCEDURE — 85025 COMPLETE CBC W/AUTO DIFF WBC: CPT

## 2024-11-01 PROCEDURE — 83036 HEMOGLOBIN GLYCOSYLATED A1C: CPT

## 2024-11-01 PROCEDURE — 36415 COLL VENOUS BLD VENIPUNCTURE: CPT

## 2024-11-01 PROCEDURE — 80048 BASIC METABOLIC PNL TOTAL CA: CPT

## 2024-11-07 ENCOUNTER — OFFICE VISIT (OUTPATIENT)
Dept: PRIMARY CARE CLINIC | Age: 69
End: 2024-11-07

## 2024-11-07 VITALS
SYSTOLIC BLOOD PRESSURE: 168 MMHG | TEMPERATURE: 98.5 F | RESPIRATION RATE: 20 BRPM | BODY MASS INDEX: 29.44 KG/M2 | DIASTOLIC BLOOD PRESSURE: 82 MMHG | OXYGEN SATURATION: 98 % | WEIGHT: 188 LBS | HEART RATE: 102 BPM

## 2024-11-07 DIAGNOSIS — I10 PRIMARY HYPERTENSION: ICD-10-CM

## 2024-11-07 DIAGNOSIS — E11.65 UNCONTROLLED TYPE 2 DIABETES MELLITUS WITH HYPERGLYCEMIA (HCC): Primary | ICD-10-CM

## 2024-11-07 RX ORDER — BLOOD-GLUCOSE SENSOR
1 EACH MISCELLANEOUS
Qty: 6 EACH | Refills: 3 | Status: SHIPPED | OUTPATIENT
Start: 2024-11-07

## 2024-11-07 RX ORDER — GLIPIZIDE 10 MG/1
10 TABLET, FILM COATED, EXTENDED RELEASE ORAL DAILY
Qty: 90 TABLET | Refills: 3 | Status: SHIPPED | OUTPATIENT
Start: 2024-11-07

## 2024-11-07 RX ORDER — KETOROLAC TROMETHAMINE 30 MG/ML
1 INJECTION, SOLUTION INTRAMUSCULAR; INTRAVENOUS DAILY
Qty: 1 EACH | Refills: 0 | Status: SHIPPED | OUTPATIENT
Start: 2024-11-07

## 2024-11-07 RX ORDER — PIOGLITAZONEHYDROCHLORIDE 15 MG/1
15 TABLET ORAL DAILY
Qty: 90 TABLET | Refills: 0 | Status: SHIPPED | OUTPATIENT
Start: 2024-11-07

## 2024-11-07 ASSESSMENT — ENCOUNTER SYMPTOMS
CONSTIPATION: 0
NAUSEA: 0
DIARRHEA: 0
COUGH: 0
SORE THROAT: 0
ABDOMINAL PAIN: 0
VOMITING: 0
BLURRED VISION: 0
WHEEZING: 0
RHINORRHEA: 0
SHORTNESS OF BREATH: 0

## 2024-11-07 ASSESSMENT — PATIENT HEALTH QUESTIONNAIRE - PHQ9
SUM OF ALL RESPONSES TO PHQ QUESTIONS 1-9: 0
2. FEELING DOWN, DEPRESSED OR HOPELESS: NOT AT ALL
SUM OF ALL RESPONSES TO PHQ9 QUESTIONS 1 & 2: 0
1. LITTLE INTEREST OR PLEASURE IN DOING THINGS: NOT AT ALL
SUM OF ALL RESPONSES TO PHQ QUESTIONS 1-9: 0

## 2024-11-07 NOTE — PATIENT INSTRUCTIONS
SURVEY:     You may be receiving a survey from [facility] regarding your visit today.     Please complete the survey to enable us to provide the highest quality of care to you and your family.     If you cannot score us a very good on any question, please call the office to discuss how we could have made your experience a very good one.     Thank you,    Dwayne Fisher, APRN-CNP  April Mathis, APRN-CNP  Ro, LPN  Marnie, CMA  Caleb, CMA  Aury, CMA  Dionna, PCA  Soledad, CMA  Gladys, PM

## 2024-11-07 NOTE — PROGRESS NOTES
Name: Yasmany Alcantara  : 1955         Chief Complaint:     Chief Complaint   Patient presents with    Diabetes     Routine check. No concerns. Review labs.       History of Present Illness:      Yasmany Alcantara is a 68 y.o.  male who presents with Diabetes (Routine check. No concerns. Review labs.)      Yasmany is here today for a routine office visit.    Unfortunately Yasmany states he will no longer be following with endocrinology.  He states he did not get along with the provider that saw him and he refuses to return to the office.  He states that he will not take insulin or any medication that needs to be further treated.  He states he will be leaving tomorrow without refrigeration and can make no other arrangements.  He absolutely refuses to entertain any ideas about taking the medications and needs refrigeration.    He states his meter at home tells him his blood sugar levels are around 74 every morning.  His A1c is 10.1.  He states sometimes he has been taking medication because he is getting these readings at home.  He is willing to obtain a new monitoring system.  He states the pharmacy \"give gives me junk\".  He would like a CGM.  He is willing to work with our care coordinator to see what supplies he is eligible for.    Diabetes  He presents for his follow-up diabetic visit. He has type 2 diabetes mellitus. His disease course has been worsening. There are no hypoglycemic associated symptoms. Pertinent negatives for hypoglycemia include no dizziness or headaches. Associated symptoms include foot paresthesias. Pertinent negatives for diabetes include no blurred vision, no chest pain, no fatigue, no polydipsia, no polyphagia and no polyuria. There are no hypoglycemic complications. Symptoms are stable. Diabetic complications include a CVA, nephropathy and peripheral neuropathy. Pertinent negatives for diabetic complications include no heart disease. Risk factors for coronary artery disease include

## 2024-12-26 DIAGNOSIS — E11.65 UNCONTROLLED TYPE 2 DIABETES MELLITUS WITH HYPERGLYCEMIA (HCC): Primary | ICD-10-CM

## 2024-12-26 DIAGNOSIS — E78.5 DYSLIPIDEMIA: ICD-10-CM

## 2024-12-26 DIAGNOSIS — I10 PRIMARY HYPERTENSION: ICD-10-CM

## 2024-12-26 NOTE — TELEPHONE ENCOUNTER
Health Maintenance   Topic Date Due    Lung Cancer Screening &/or Counseling  Never done    Colorectal Cancer Screen  09/14/2023    Annual Wellness Visit (Medicare)  10/19/2024    Shingles vaccine (1 of 2) 05/14/2025 (Originally 11/15/2007)    AAA screen  05/14/2025 (Originally 11/15/2022)    Flu vaccine (1) 11/07/2025 (Originally 8/1/2024)    Pneumococcal 65+ years Vaccine (2 of 2 - PCV) 11/07/2025 (Originally 4/9/2019)    COVID-19 Vaccine (1 - 2023-24 season) 11/07/2025 (Originally 9/1/2024)    Diabetic retinal exam  11/17/2025 (Originally 12/13/2017)    A1C test (Diabetic or Prediabetic)  02/01/2025    Prostate Specific Antigen (PSA) Screening or Monitoring  02/12/2025    Diabetic Alb to Cr ratio (uACR) test  05/13/2025    Lipids  05/13/2025    Diabetic foot exam  05/14/2025    GFR test (Diabetes, CKD 3-4, OR last GFR 15-59)  11/01/2025    Depression Screen  11/07/2025    DTaP/Tdap/Td vaccine (3 - Td or Tdap) 04/12/2031    Respiratory Syncytial Virus (RSV) Pregnant or age 60 yrs+ (1 - 1-dose 75+ series) 11/15/2032    Hepatitis C screen  Completed    Hepatitis A vaccine  Aged Out    Hepatitis B vaccine  Aged Out    Hib vaccine  Aged Out    Polio vaccine  Aged Out    Meningococcal (ACWY) vaccine  Aged Out    Pneumococcal 0-64 years Vaccine  Discontinued    HIV screen  Discontinued             (applicable per patient's age: Cancer Screenings, Depression Screening, Fall Risk Screening, Immunizations)    Hemoglobin A1C (%)   Date Value   11/01/2024 10.1 (H)   08/14/2024 9.8   05/13/2024 9.8 (H)     AST (U/L)   Date Value   05/13/2024 18     ALT (U/L)   Date Value   05/13/2024 18     BUN (mg/dL)   Date Value   11/01/2024 16      (goal A1C is < 7)   (goal LDL is <100) need 30-50% reduction from baseline     BP Readings from Last 3 Encounters:   11/07/24 (!) 168/82   08/14/24 138/80   07/19/24 (!) 152/81    (goal /80)      All Future Testing planned in [facility]:  Lab Frequency Next Occurrence   US BIOPSY PROSTATE

## 2024-12-27 RX ORDER — MONTELUKAST SODIUM 10 MG/1
10 TABLET ORAL NIGHTLY
Qty: 100 TABLET | Refills: 3 | Status: SHIPPED | OUTPATIENT
Start: 2024-12-27

## 2024-12-27 RX ORDER — ATORVASTATIN CALCIUM 40 MG/1
40 TABLET, FILM COATED ORAL DAILY
Qty: 100 TABLET | Refills: 3 | Status: SHIPPED | OUTPATIENT
Start: 2024-12-27

## 2024-12-27 RX ORDER — GLIPIZIDE 10 MG/1
10 TABLET, FILM COATED, EXTENDED RELEASE ORAL DAILY
Qty: 100 TABLET | Refills: 3 | Status: SHIPPED | OUTPATIENT
Start: 2024-12-27

## 2024-12-27 RX ORDER — LISINOPRIL 5 MG/1
5 TABLET ORAL DAILY
Qty: 100 TABLET | Refills: 3 | Status: SHIPPED | OUTPATIENT
Start: 2024-12-27

## 2024-12-27 RX ORDER — DULOXETIN HYDROCHLORIDE 30 MG/1
30 CAPSULE, DELAYED RELEASE ORAL DAILY
Qty: 100 CAPSULE | Refills: 3 | Status: SHIPPED | OUTPATIENT
Start: 2024-12-27

## 2024-12-27 RX ORDER — PIOGLITAZONE 30 MG/1
30 TABLET ORAL DAILY
Qty: 100 TABLET | Refills: 3 | Status: SHIPPED | OUTPATIENT
Start: 2024-12-27

## 2024-12-27 RX ORDER — AMLODIPINE BESYLATE 5 MG/1
5 TABLET ORAL DAILY
Qty: 100 TABLET | Refills: 3 | Status: SHIPPED | OUTPATIENT
Start: 2024-12-27

## 2025-03-02 DIAGNOSIS — E11.65 UNCONTROLLED TYPE 2 DIABETES MELLITUS WITH HYPERGLYCEMIA (HCC): ICD-10-CM

## 2025-03-02 RX ORDER — PIOGLITAZONE 30 MG/1
30 TABLET ORAL DAILY
Qty: 90 TABLET | Refills: 3 | Status: SHIPPED | OUTPATIENT
Start: 2025-03-02

## 2025-04-17 ENCOUNTER — TELEPHONE (OUTPATIENT)
Dept: PHARMACY | Facility: CLINIC | Age: 70
End: 2025-04-17

## 2025-04-17 NOTE — TELEPHONE ENCOUNTER
[facility] CLINICAL PHARMACY: ADHERENCE REVIEW  Identified care gap per [place]: fills at [facility] [place]: Diabetes adherence      ASSESSMENT  DIABETES ADHERENCE    Insurance Records claims through 25 (Prior Year PDC = not reported; YTD PDC = FIRST FILL; Potential Fail Date: 25):   PIOGLITAZONE HCL 30 MG TABLET last filled on 25 for 30 day supply. Next refill due: 25    Prescribed si tablet/capsule daily    Per Insurer Portal: last filled on same    Per [facility]: will get  90 day supply ready to  since past due.    Lab Results   Component Value Date    LABA1C 10.1 (H) 2024    LABA1C 9.8 2024    LABA1C 9.8 (H) 2024     NOTE: A1c not yet completed this calendar year        The following are interventions that have been identified:   Patient OVERDUE refilling PIOGLITAZONE HCL 30 MG TABLET and active on home medication list.   Patient eligible for 90 day supply of PIOGLITAZONE HCL 30 MG TABLET    Attempting to reach patient to review.  Left message asking for return call. Letter sent to patient.       Last Visit: 24  Next Visit: none    Gladys Gomez CPhT  [facility] Clinical   [facility] Clinical [facility]  Toll Free: [phone] Option 1    For Pharmacy Admin Tracking Only    Program: [facility]  CPA in place:  No  Recommendation Provided To: Pharmacy: 1  Intervention Detail: Adherence Monitorin  Intervention Accepted By: Pharmacy: 1  Gap Closed?: Yes   Time Spent (min): 15

## 2025-07-08 NOTE — CARE COORDINATION
Ambulatory Care Coordination Note  10/23/2023    Received following referral message from PCP:     Myah Fisher APRN - GABRIELLA Neff, RN  [facility],     Would you please help Mr. Alcantara. We sent all medications to [facility] in [facility] and I want to make sure he can get them with his insurance. Also he states his visits are not covered because he is being seen by an NP, if this is the case I can ask Dr. Faye Roca to see him but would like you to check with [facility] if you can. Thank you. Call placed to [facility]. Spoke with Member services line representative. Confirmed that listed PCP is current/active PCP. Representative states that then visits will be covered by [employer]. States possibly the patient got confused when they sent him a letter about a denial back in May 2382- had \"duplicate\" referral order for same doctor (ENT). Was reassured that PCP was covered. Was transferred to pharmacy line to inquire about patient informing mail in pharmacy not covered. Spoke with [facility]- states that she does not see any prescriptions through them. Informed he was utilizing [facility] pharmacy previously- she encouraged patient reach out to [facility] and see what mail in pharmacies are covered and utilize them- however patient must give verbal consent or speak for himself on that call. Call placed to patient. Unable to reach [place]. Left a voicemail message with reason for call. Requesting a return phone call back to this [facility] when patient is able to [phone], office hours given. Future Appointments   Date Time Provider [address] [place] [address]   11/2/2023  3:45 PM SCHEDULE, [facility]   1/18/2024  3:40 PM Myah Fisher APRN - CNP [facility]       Care Coordination Plan of Care:    This nurse Care Coordinator will  - await call back from patient to discuss  -update PCP on separate encounter- CC'd chart
Attending Only

## 2025-07-30 NOTE — CARE COORDINATION
Ambulatory Care Coordination Note  5/17/2024    Received referral for care coordination from PCP referral. See referral note below:     Dwayne Fisher, Shi Bragg CNP, RN  Please enroll Yasmany in care coordination    Would like him to report daily fastings and 2-hour post largest meal at least 4 2 to 3 weeks.  Thank you.          CC outreach call placed to patient. Unable to reach Yasmany. Left a voicemail message requesting a return phone call back to this [facility] when patient is able to [phone], office hours given.       Future Appointments   Date Time Provider Department Center   8/14/2024  2:00 PM Dwayne Fisher APRN - CNP [facility]                [Well Developed] : well developed [Well Nourished] : well nourished [NAD] : in no acute distress [PERRL] : pupils were equal, round, reactive to light  [Moist & Pink Mucous Membranes] : moist and pink mucous membranes [Normal Oropharynx] : the oropharynx was normal [CTAB] : lungs clear to auscultation bilaterally [Regular Rate and Rhythm] : regular rate and rhythm [Normal S1, S2] : normal S1 and S2 [Soft] : soft  [Normal Bowel Sounds] : normal bowel sounds [No HSM] : no hepatosplenomegaly appreciated [Normal Tone] : normal tone [Well-Perfused] : well-perfused [Interactive] : interactive [icteric] : anicteric [Oral Ulcers] : no oral ulcers [Respiratory Distress] : no respiratory distress  [Distended] : non distended [Tender] : non tender [Edema] : no edema [Cyanosis] : no cyanosis [Rash] : no rash [Jaundice] : no jaundice

## (undated) DEVICE — TRAP SURG QUAD PARABOLA SLOT DSGN SFTY SCRN TRAPEASE

## (undated) DEVICE — SOLUTION IV IRRIG POUR BRL 0.9% SODIUM CHL 2F7124

## (undated) DEVICE — PAD ADH AD ELECTRD 2 PLT W 5M CRD

## (undated) DEVICE — ACUSNARE POLYPECTOMY SNARE: Brand: ACUSNARE

## (undated) DEVICE — MEDI-VAC NON-CONDUCTIVE TUBING7MM X 30.5 (100FT): Brand: CARDINAL HEALTH

## (undated) DEVICE — CANNULA ORAL NSL AD CO2 N INTUB O2 DEL DISP TRU LNK

## (undated) DEVICE — SNARE EXACTO COLD